# Patient Record
Sex: FEMALE | Race: WHITE | NOT HISPANIC OR LATINO | Employment: FULL TIME | URBAN - METROPOLITAN AREA
[De-identification: names, ages, dates, MRNs, and addresses within clinical notes are randomized per-mention and may not be internally consistent; named-entity substitution may affect disease eponyms.]

---

## 2017-01-06 ENCOUNTER — ALLSCRIPTS OFFICE VISIT (OUTPATIENT)
Dept: OTHER | Facility: OTHER | Age: 52
End: 2017-01-06

## 2017-01-06 DIAGNOSIS — F41.1 GENERALIZED ANXIETY DISORDER: ICD-10-CM

## 2017-01-06 DIAGNOSIS — R10.12 LEFT UPPER QUADRANT PAIN: ICD-10-CM

## 2017-01-06 DIAGNOSIS — K21.9 GASTRO-ESOPHAGEAL REFLUX DISEASE WITHOUT ESOPHAGITIS: ICD-10-CM

## 2017-01-09 ENCOUNTER — LAB CONVERSION - ENCOUNTER (OUTPATIENT)
Dept: OTHER | Facility: OTHER | Age: 52
End: 2017-01-09

## 2017-01-09 LAB
A/G RATIO (HISTORICAL): 1.8 (CALC) (ref 1–2.5)
ALBUMIN SERPL BCP-MCNC: 4.6 G/DL (ref 3.6–5.1)
ALP SERPL-CCNC: 61 U/L (ref 33–130)
ALT SERPL W P-5'-P-CCNC: 37 U/L (ref 6–29)
AST SERPL W P-5'-P-CCNC: 24 U/L (ref 10–35)
BACTERIA UR QL AUTO: ABNORMAL /HPF
BASOPHILS # BLD AUTO: 0.5 %
BASOPHILS # BLD AUTO: 27 CELLS/UL (ref 0–200)
BILIRUB SERPL-MCNC: 0.5 MG/DL (ref 0.2–1.2)
BILIRUB UR QL STRIP: NEGATIVE
BUN SERPL-MCNC: 16 MG/DL (ref 7–25)
BUN/CREA RATIO (HISTORICAL): ABNORMAL (CALC) (ref 6–22)
CALCIUM SERPL-MCNC: 10.5 MG/DL (ref 8.6–10.4)
CHLORIDE SERPL-SCNC: 103 MMOL/L (ref 98–110)
CO2 SERPL-SCNC: 25 MMOL/L (ref 20–31)
COLOR UR: YELLOW
COMMENT (HISTORICAL): CLEAR
CREAT SERPL-MCNC: 0.74 MG/DL (ref 0.5–1.05)
CULTURE RESULT (HISTORICAL): ABNORMAL
DEPRECATED RDW RBC AUTO: 13.5 % (ref 11–15)
EGFR AFRICAN AMERICAN (HISTORICAL): 108 ML/MIN/1.73M2
EGFR-AMERICAN CALC (HISTORICAL): 93 ML/MIN/1.73M2
EOSINOPHIL # BLD AUTO: 138 CELLS/UL (ref 15–500)
EOSINOPHIL # BLD AUTO: 2.6 %
FECAL OCCULT BLOOD DIAGNOSTIC (HISTORICAL): NEGATIVE
GAMMA GLOBULIN (HISTORICAL): 2.5 G/DL (CALC) (ref 1.9–3.7)
GLUCOSE (HISTORICAL): 98 MG/DL (ref 65–99)
GLUCOSE (HISTORICAL): NEGATIVE
HCT VFR BLD AUTO: 43.3 % (ref 35–45)
HGB BLD-MCNC: 14.3 G/DL (ref 11.7–15.5)
HYALINE CASTS #/AREA URNS LPF: ABNORMAL /LPF
KETONES UR STRIP-MCNC: NEGATIVE MG/DL
LEUKOCYTE ESTERASE UR QL STRIP: ABNORMAL
LIPASE SERPL-CCNC: 24 U/L (ref 7–60)
LYMPHOCYTES # BLD AUTO: 1696 CELLS/UL (ref 850–3900)
LYMPHOCYTES # BLD AUTO: 32 %
MCH RBC QN AUTO: 28 PG (ref 27–33)
MCHC RBC AUTO-ENTMCNC: 33 G/DL (ref 32–36)
MCV RBC AUTO: 84.9 FL (ref 80–100)
MONOCYTES # BLD AUTO: 371 CELLS/UL (ref 200–950)
MONOCYTES (HISTORICAL): 7 %
NEUTROPHILS # BLD AUTO: 3069 CELLS/UL (ref 1500–7800)
NEUTROPHILS # BLD AUTO: 57.9 %
NITRITE UR QL STRIP: NEGATIVE
PH UR STRIP.AUTO: 7 [PH] (ref 5–8)
PLATELET # BLD AUTO: 222 THOUSAND/UL (ref 140–400)
PMV BLD AUTO: 9.6 FL (ref 7.5–11.5)
POTASSIUM SERPL-SCNC: 4.2 MMOL/L (ref 3.5–5.3)
PROT UR STRIP-MCNC: NEGATIVE MG/DL
RBC # BLD AUTO: 5.1 MILLION/UL (ref 3.8–5.1)
RBC (HISTORICAL): ABNORMAL /HPF
SODIUM SERPL-SCNC: 138 MMOL/L (ref 135–146)
SP GR UR STRIP.AUTO: 1.02 (ref 1–1.03)
SQUAMOUS EPITHELIAL CELLS (HISTORICAL): ABNORMAL /HPF
TOTAL PROTEIN (HISTORICAL): 7.1 G/DL (ref 6.1–8.1)
TSH SERPL DL<=0.05 MIU/L-ACNC: 1.54 MIU/L
WBC # BLD AUTO: 5.3 THOUSAND/UL (ref 3.8–10.8)
WBC # BLD AUTO: ABNORMAL /HPF

## 2017-01-12 ENCOUNTER — GENERIC CONVERSION - ENCOUNTER (OUTPATIENT)
Dept: OTHER | Facility: OTHER | Age: 52
End: 2017-01-12

## 2017-02-09 DIAGNOSIS — R10.12 LEFT UPPER QUADRANT PAIN: ICD-10-CM

## 2017-03-20 ENCOUNTER — GENERIC CONVERSION - ENCOUNTER (OUTPATIENT)
Dept: OTHER | Facility: OTHER | Age: 52
End: 2017-03-20

## 2017-03-28 ENCOUNTER — GENERIC CONVERSION - ENCOUNTER (OUTPATIENT)
Dept: OTHER | Facility: OTHER | Age: 52
End: 2017-03-28

## 2017-04-04 DIAGNOSIS — K21.9 GASTRO-ESOPHAGEAL REFLUX DISEASE WITHOUT ESOPHAGITIS: ICD-10-CM

## 2017-04-04 DIAGNOSIS — R74.01 NONSPECIFIC ELEVATION OF LEVELS OF TRANSAMINASE AND LACTIC ACID DEHYDROGENASE (LDH): ICD-10-CM

## 2017-04-04 DIAGNOSIS — M54.9 DORSALGIA: ICD-10-CM

## 2017-04-04 DIAGNOSIS — R74.02 NONSPECIFIC ELEVATION OF LEVELS OF TRANSAMINASE AND LACTIC ACID DEHYDROGENASE (LDH): ICD-10-CM

## 2017-04-06 ENCOUNTER — GENERIC CONVERSION - ENCOUNTER (OUTPATIENT)
Dept: OTHER | Facility: OTHER | Age: 52
End: 2017-04-06

## 2017-04-19 ENCOUNTER — ALLSCRIPTS OFFICE VISIT (OUTPATIENT)
Dept: OTHER | Facility: OTHER | Age: 52
End: 2017-04-19

## 2017-04-29 ENCOUNTER — HOSPITAL ENCOUNTER (OUTPATIENT)
Dept: ULTRASOUND IMAGING | Facility: HOSPITAL | Age: 52
Discharge: HOME | End: 2017-04-29
Payer: COMMERCIAL

## 2017-04-29 DIAGNOSIS — M54.9 DORSALGIA: ICD-10-CM

## 2017-04-29 DIAGNOSIS — K21.9 GASTRO-ESOPHAGEAL REFLUX DISEASE WITHOUT ESOPHAGITIS: ICD-10-CM

## 2017-04-29 DIAGNOSIS — R74.02 NONSPECIFIC ELEVATION OF LEVELS OF TRANSAMINASE AND LACTIC ACID DEHYDROGENASE (LDH): ICD-10-CM

## 2017-04-29 DIAGNOSIS — R74.01 NONSPECIFIC ELEVATION OF LEVELS OF TRANSAMINASE AND LACTIC ACID DEHYDROGENASE (LDH): ICD-10-CM

## 2017-04-29 PROCEDURE — 76705 ECHO EXAM OF ABDOMEN: CPT

## 2017-05-31 ENCOUNTER — GENERIC CONVERSION - ENCOUNTER (OUTPATIENT)
Dept: OTHER | Facility: OTHER | Age: 52
End: 2017-05-31

## 2017-06-01 RX ORDER — MOMETASONE FUROATE 50 UG/1
2 SPRAY, METERED NASAL AS NEEDED
COMMUNITY
End: 2018-04-04 | Stop reason: SDUPTHER

## 2017-06-01 RX ORDER — ESOMEPRAZOLE MAGNESIUM 40 MG/1
40 CAPSULE, DELAYED RELEASE ORAL
Status: ON HOLD | COMMUNITY
End: 2017-06-02 | Stop reason: HOSPADM

## 2017-06-01 RX ORDER — ALPRAZOLAM 0.5 MG/1
0.5 TABLET ORAL EVERY 4 HOURS PRN
COMMUNITY
End: 2018-08-09 | Stop reason: SDUPTHER

## 2017-06-02 ENCOUNTER — GENERIC CONVERSION - ENCOUNTER (OUTPATIENT)
Dept: OTHER | Facility: OTHER | Age: 52
End: 2017-06-02

## 2017-06-02 ENCOUNTER — ANESTHESIA (OUTPATIENT)
Dept: GASTROENTEROLOGY | Facility: HOSPITAL | Age: 52
End: 2017-06-02
Payer: COMMERCIAL

## 2017-06-02 ENCOUNTER — HOSPITAL ENCOUNTER (OUTPATIENT)
Facility: HOSPITAL | Age: 52
Setting detail: OUTPATIENT SURGERY
Discharge: HOME | End: 2017-06-02
Attending: INTERNAL MEDICINE | Admitting: INTERNAL MEDICINE
Payer: COMMERCIAL

## 2017-06-02 ENCOUNTER — ANESTHESIA EVENT (OUTPATIENT)
Dept: GASTROENTEROLOGY | Facility: HOSPITAL | Age: 52
End: 2017-06-02
Payer: COMMERCIAL

## 2017-06-02 VITALS
WEIGHT: 180 LBS | HEIGHT: 63 IN | OXYGEN SATURATION: 100 % | RESPIRATION RATE: 16 BRPM | TEMPERATURE: 98.8 F | SYSTOLIC BLOOD PRESSURE: 105 MMHG | DIASTOLIC BLOOD PRESSURE: 70 MMHG | BODY MASS INDEX: 31.89 KG/M2 | HEART RATE: 70 BPM

## 2017-06-02 DIAGNOSIS — Z12.12 ENCOUNTER FOR SCREENING FOR MALIGNANT NEOPLASM OF RECTUM: ICD-10-CM

## 2017-06-02 DIAGNOSIS — K21.9 GASTRO-ESOPHAGEAL REFLUX DISEASE WITHOUT ESOPHAGITIS: ICD-10-CM

## 2017-06-02 DIAGNOSIS — Z12.11 ENCOUNTER FOR SCREENING FOR MALIGNANT NEOPLASM OF COLON: ICD-10-CM

## 2017-06-02 PROCEDURE — 88342 IMHCHEM/IMCYTCHM 1ST ANTB: CPT | Performed by: INTERNAL MEDICINE

## 2017-06-02 PROCEDURE — 88305 TISSUE EXAM BY PATHOLOGIST: CPT | Performed by: INTERNAL MEDICINE

## 2017-06-02 PROCEDURE — 88313 SPECIAL STAINS GROUP 2: CPT | Performed by: INTERNAL MEDICINE

## 2017-06-02 RX ORDER — SODIUM CHLORIDE 9 MG/ML
100 INJECTION, SOLUTION INTRAVENOUS CONTINUOUS
Status: CANCELLED | OUTPATIENT
Start: 2017-06-02

## 2017-06-02 RX ORDER — PROPOFOL 10 MG/ML
INJECTION, EMULSION INTRAVENOUS CONTINUOUS PRN
Status: DISCONTINUED | OUTPATIENT
Start: 2017-06-02 | End: 2017-06-02 | Stop reason: SURG

## 2017-06-02 RX ORDER — CETIRIZINE HYDROCHLORIDE 10 MG/1
10 TABLET ORAL DAILY
COMMUNITY

## 2017-06-02 RX ORDER — SODIUM CHLORIDE 9 MG/ML
50 INJECTION, SOLUTION INTRAVENOUS CONTINUOUS
Status: DISCONTINUED | OUTPATIENT
Start: 2017-06-02 | End: 2017-06-02 | Stop reason: HOSPADM

## 2017-06-02 RX ORDER — PROPOFOL 10 MG/ML
INJECTION, EMULSION INTRAVENOUS AS NEEDED
Status: DISCONTINUED | OUTPATIENT
Start: 2017-06-02 | End: 2017-06-02 | Stop reason: SURG

## 2017-06-02 RX ORDER — AMOXICILLIN 500 MG/1
500 CAPSULE ORAL 2 TIMES DAILY
COMMUNITY
End: 2017-08-29

## 2017-06-02 RX ORDER — OMEPRAZOLE 40 MG/1
40 CAPSULE, DELAYED RELEASE ORAL
COMMUNITY
End: 2018-08-12 | Stop reason: ALTCHOICE

## 2017-06-02 RX ADMIN — PROPOFOL 80 MCG/KG/MIN: 10 INJECTION, EMULSION INTRAVENOUS at 10:33

## 2017-06-02 RX ADMIN — PROPOFOL 80 MG: 10 INJECTION, EMULSION INTRAVENOUS at 10:28

## 2017-06-02 RX ADMIN — SODIUM CHLORIDE 50 ML/HR: 0.9 INJECTION, SOLUTION INTRAVENOUS at 10:14

## 2017-06-02 RX ADMIN — PROPOFOL 30 MG: 10 INJECTION, EMULSION INTRAVENOUS at 10:31

## 2017-06-02 RX ADMIN — LIDOCAINE HYDROCHLORIDE 100 MG: 20 INJECTION, SOLUTION INTRAVENOUS at 10:28

## 2017-06-21 ENCOUNTER — GENERIC CONVERSION - ENCOUNTER (OUTPATIENT)
Dept: OTHER | Facility: OTHER | Age: 52
End: 2017-06-21

## 2017-06-29 ENCOUNTER — ALLSCRIPTS OFFICE VISIT (OUTPATIENT)
Dept: OTHER | Facility: OTHER | Age: 52
End: 2017-06-29

## 2017-06-30 DIAGNOSIS — R22.9 LOCALIZED SWELLING, MASS AND LUMP, UNSPECIFIED: ICD-10-CM

## 2017-07-19 ENCOUNTER — ALLSCRIPTS OFFICE VISIT (OUTPATIENT)
Dept: OTHER | Facility: OTHER | Age: 52
End: 2017-07-19

## 2017-07-21 ENCOUNTER — HOSPITAL ENCOUNTER (OUTPATIENT)
Dept: MRI IMAGING | Facility: HOSPITAL | Age: 52
Discharge: HOME | End: 2017-07-21
Attending: SURGERY
Payer: COMMERCIAL

## 2017-07-21 DIAGNOSIS — R22.9 LOCALIZED SWELLING, MASS AND LUMP, UNSPECIFIED: ICD-10-CM

## 2017-07-21 PROCEDURE — 72197 MRI PELVIS W/O & W/DYE: CPT

## 2017-07-21 PROCEDURE — A9585 GADOBUTROL INJECTION: HCPCS | Performed by: SURGERY

## 2017-07-21 RX ADMIN — GADOBUTROL 7 ML: 604.72 INJECTION INTRAVENOUS at 19:06

## 2017-07-26 ENCOUNTER — GENERIC CONVERSION - ENCOUNTER (OUTPATIENT)
Dept: OTHER | Facility: OTHER | Age: 52
End: 2017-07-26

## 2017-08-07 ENCOUNTER — ALLSCRIPTS OFFICE VISIT (OUTPATIENT)
Dept: OTHER | Facility: OTHER | Age: 52
End: 2017-08-07

## 2017-08-07 DIAGNOSIS — R74.02 NONSPECIFIC ELEVATION OF LEVELS OF TRANSAMINASE AND LACTIC ACID DEHYDROGENASE (LDH): ICD-10-CM

## 2017-08-07 DIAGNOSIS — R74.01 NONSPECIFIC ELEVATION OF LEVELS OF TRANSAMINASE AND LACTIC ACID DEHYDROGENASE (LDH): ICD-10-CM

## 2017-08-29 ENCOUNTER — ANESTHESIA EVENT (OUTPATIENT)
Dept: PERIOP | Facility: HOSPITAL | Age: 52
End: 2017-08-29
Payer: COMMERCIAL

## 2017-08-29 RX ORDER — IBUPROFEN 200 MG
400 TABLET ORAL EVERY 6 HOURS PRN
COMMUNITY
End: 2018-04-04

## 2017-08-29 RX ORDER — ACETAMINOPHEN 500 MG
500 TABLET ORAL EVERY 6 HOURS PRN
COMMUNITY

## 2017-08-30 ENCOUNTER — GENERIC CONVERSION - ENCOUNTER (OUTPATIENT)
Dept: PERIOP | Facility: HOSPITAL | Age: 52
End: 2017-08-30

## 2017-08-30 ENCOUNTER — HOSPITAL ENCOUNTER (OUTPATIENT)
Facility: HOSPITAL | Age: 52
Setting detail: OUTPATIENT SURGERY
Discharge: HOME | End: 2017-08-30
Attending: SURGERY | Admitting: SURGERY
Payer: COMMERCIAL

## 2017-08-30 ENCOUNTER — ANESTHESIA (OUTPATIENT)
Dept: PERIOP | Facility: HOSPITAL | Age: 52
End: 2017-08-30
Payer: COMMERCIAL

## 2017-08-30 VITALS
OXYGEN SATURATION: 95 % | RESPIRATION RATE: 16 BRPM | BODY MASS INDEX: 31.36 KG/M2 | HEART RATE: 69 BPM | WEIGHT: 177 LBS | HEIGHT: 63 IN | SYSTOLIC BLOOD PRESSURE: 126 MMHG | TEMPERATURE: 98.3 F | DIASTOLIC BLOOD PRESSURE: 71 MMHG

## 2017-08-30 DIAGNOSIS — L72.3 SEBACEOUS CYST: ICD-10-CM

## 2017-08-30 PROCEDURE — 88304 TISSUE EXAM BY PATHOLOGIST: CPT | Performed by: SURGERY

## 2017-08-30 RX ORDER — MORPHINE SULFATE 4 MG/ML
2 INJECTION, SOLUTION INTRAMUSCULAR; INTRAVENOUS EVERY 2 HOUR PRN
Status: DISCONTINUED | OUTPATIENT
Start: 2017-08-30 | End: 2017-08-30 | Stop reason: HOSPADM

## 2017-08-30 RX ORDER — DEXAMETHASONE SODIUM PHOSPHATE 4 MG/ML
INJECTION, SOLUTION INTRA-ARTICULAR; INTRALESIONAL; INTRAMUSCULAR; INTRAVENOUS; SOFT TISSUE AS NEEDED
Status: DISCONTINUED | OUTPATIENT
Start: 2017-08-30 | End: 2017-08-30 | Stop reason: SURG

## 2017-08-30 RX ORDER — FENTANYL CITRATE/PF 50 MCG/ML
25 SYRINGE (ML) INJECTION
Status: COMPLETED | OUTPATIENT
Start: 2017-08-30 | End: 2017-08-30

## 2017-08-30 RX ORDER — MEPERIDINE HYDROCHLORIDE 50 MG/ML
12.5 INJECTION INTRAMUSCULAR; INTRAVENOUS; SUBCUTANEOUS AS NEEDED
Status: DISCONTINUED | OUTPATIENT
Start: 2017-08-30 | End: 2017-08-30 | Stop reason: HOSPADM

## 2017-08-30 RX ORDER — PROPOFOL 10 MG/ML
INJECTION, EMULSION INTRAVENOUS AS NEEDED
Status: DISCONTINUED | OUTPATIENT
Start: 2017-08-30 | End: 2017-08-30 | Stop reason: SURG

## 2017-08-30 RX ORDER — FENTANYL CITRATE 50 UG/ML
INJECTION, SOLUTION INTRAMUSCULAR; INTRAVENOUS AS NEEDED
Status: DISCONTINUED | OUTPATIENT
Start: 2017-08-30 | End: 2017-08-30 | Stop reason: SURG

## 2017-08-30 RX ORDER — SODIUM CHLORIDE 9 MG/ML
125 INJECTION, SOLUTION INTRAVENOUS CONTINUOUS
Status: DISCONTINUED | OUTPATIENT
Start: 2017-08-30 | End: 2017-08-30 | Stop reason: HOSPADM

## 2017-08-30 RX ORDER — HYDROCODONE BITARTRATE AND ACETAMINOPHEN 5; 325 MG/1; MG/1
1 TABLET ORAL EVERY 4 HOURS PRN
Status: DISCONTINUED | OUTPATIENT
Start: 2017-08-30 | End: 2017-08-30 | Stop reason: HOSPADM

## 2017-08-30 RX ORDER — FENTANYL CITRATE/PF 50 MCG/ML
50 SYRINGE (ML) INJECTION
Status: DISCONTINUED | OUTPATIENT
Start: 2017-08-30 | End: 2017-08-30 | Stop reason: HOSPADM

## 2017-08-30 RX ORDER — ONDANSETRON 2 MG/ML
4 INJECTION INTRAMUSCULAR; INTRAVENOUS EVERY 4 HOURS PRN
Status: DISCONTINUED | OUTPATIENT
Start: 2017-08-30 | End: 2017-08-30 | Stop reason: HOSPADM

## 2017-08-30 RX ORDER — ONDANSETRON 2 MG/ML
INJECTION INTRAMUSCULAR; INTRAVENOUS
Status: COMPLETED
Start: 2017-08-30 | End: 2017-08-30

## 2017-08-30 RX ORDER — EPHEDRINE SULFATE 50 MG/ML
INJECTION, SOLUTION INTRAVENOUS AS NEEDED
Status: DISCONTINUED | OUTPATIENT
Start: 2017-08-30 | End: 2017-08-30 | Stop reason: SURG

## 2017-08-30 RX ORDER — ACETAMINOPHEN 325 MG/1
650 TABLET ORAL EVERY 6 HOURS PRN
Status: DISCONTINUED | OUTPATIENT
Start: 2017-08-30 | End: 2017-08-30 | Stop reason: HOSPADM

## 2017-08-30 RX ORDER — ONDANSETRON 2 MG/ML
4 INJECTION INTRAMUSCULAR; INTRAVENOUS ONCE AS NEEDED
Status: DISCONTINUED | OUTPATIENT
Start: 2017-08-30 | End: 2017-08-30 | Stop reason: HOSPADM

## 2017-08-30 RX ORDER — SODIUM CHLORIDE, SODIUM LACTATE, POTASSIUM CHLORIDE, CALCIUM CHLORIDE 600; 310; 30; 20 MG/100ML; MG/100ML; MG/100ML; MG/100ML
75 INJECTION, SOLUTION INTRAVENOUS CONTINUOUS
Status: DISCONTINUED | OUTPATIENT
Start: 2017-08-30 | End: 2017-08-30 | Stop reason: HOSPADM

## 2017-08-30 RX ORDER — HYDROCODONE BITARTRATE AND ACETAMINOPHEN 5; 325 MG/1; MG/1
1 TABLET ORAL EVERY 6 HOURS PRN
Qty: 30 TABLET | Refills: 0 | Status: SHIPPED | OUTPATIENT
Start: 2017-08-30 | End: 2018-02-06

## 2017-08-30 RX ORDER — MAGNESIUM HYDROXIDE 1200 MG/15ML
LIQUID ORAL AS NEEDED
Status: DISCONTINUED | OUTPATIENT
Start: 2017-08-30 | End: 2017-08-30

## 2017-08-30 RX ORDER — MIDAZOLAM HYDROCHLORIDE 1 MG/ML
INJECTION INTRAMUSCULAR; INTRAVENOUS AS NEEDED
Status: DISCONTINUED | OUTPATIENT
Start: 2017-08-30 | End: 2017-08-30 | Stop reason: SURG

## 2017-08-30 RX ORDER — SODIUM CHLORIDE, SODIUM LACTATE, POTASSIUM CHLORIDE, CALCIUM CHLORIDE 600; 310; 30; 20 MG/100ML; MG/100ML; MG/100ML; MG/100ML
80 INJECTION, SOLUTION INTRAVENOUS CONTINUOUS
Status: DISCONTINUED | OUTPATIENT
Start: 2017-08-30 | End: 2017-08-30 | Stop reason: HOSPADM

## 2017-08-30 RX ORDER — ONDANSETRON 2 MG/ML
INJECTION INTRAMUSCULAR; INTRAVENOUS AS NEEDED
Status: DISCONTINUED | OUTPATIENT
Start: 2017-08-30 | End: 2017-08-30 | Stop reason: SURG

## 2017-08-30 RX ADMIN — FENTANYL CITRATE 25 MCG: 50 INJECTION INTRAMUSCULAR; INTRAVENOUS at 17:51

## 2017-08-30 RX ADMIN — EPHEDRINE SULFATE 10 MG: 50 INJECTION, SOLUTION INTRAMUSCULAR; INTRAVENOUS; SUBCUTANEOUS at 16:37

## 2017-08-30 RX ADMIN — ONDANSETRON HYDROCHLORIDE 4 MG: 2 INJECTION, SOLUTION INTRAVENOUS at 16:51

## 2017-08-30 RX ADMIN — SODIUM CHLORIDE: 0.9 INJECTION, SOLUTION INTRAVENOUS at 17:02

## 2017-08-30 RX ADMIN — FENTANYL CITRATE 100 MCG: 50 INJECTION, SOLUTION INTRAMUSCULAR; INTRAVENOUS at 16:27

## 2017-08-30 RX ADMIN — FENTANYL CITRATE 25 MCG: 50 INJECTION INTRAMUSCULAR; INTRAVENOUS at 17:31

## 2017-08-30 RX ADMIN — LIDOCAINE HYDROCHLORIDE 100 MG: 20 INJECTION, SOLUTION INTRAVENOUS at 16:33

## 2017-08-30 RX ADMIN — DEXAMETHASONE SODIUM PHOSPHATE 4 MG: 4 INJECTION, SOLUTION INTRAMUSCULAR; INTRAVENOUS at 16:50

## 2017-08-30 RX ADMIN — CEFAZOLIN SODIUM 2000 MG: 2 SOLUTION INTRAVENOUS at 16:29

## 2017-08-30 RX ADMIN — SODIUM CHLORIDE 125 ML/HR: 0.9 INJECTION, SOLUTION INTRAVENOUS at 13:14

## 2017-08-30 RX ADMIN — MIDAZOLAM HYDROCHLORIDE 2 MG: 1 INJECTION, SOLUTION INTRAMUSCULAR; INTRAVENOUS at 16:27

## 2017-08-30 RX ADMIN — PROPOFOL 200 MG: 10 INJECTION, EMULSION INTRAVENOUS at 16:33

## 2017-08-30 RX ADMIN — FENTANYL CITRATE 25 MCG: 50 INJECTION INTRAMUSCULAR; INTRAVENOUS at 17:36

## 2017-08-30 RX ADMIN — ONDANSETRON 4 MG: 2 INJECTION INTRAMUSCULAR; INTRAVENOUS at 19:07

## 2017-08-30 RX ADMIN — FENTANYL CITRATE 25 MCG: 50 INJECTION INTRAMUSCULAR; INTRAVENOUS at 17:44

## 2017-09-05 ENCOUNTER — GENERIC CONVERSION - ENCOUNTER (OUTPATIENT)
Dept: OTHER | Facility: OTHER | Age: 52
End: 2017-09-05

## 2017-09-11 ENCOUNTER — ALLSCRIPTS OFFICE VISIT (OUTPATIENT)
Dept: OTHER | Facility: OTHER | Age: 52
End: 2017-09-11

## 2017-09-20 ENCOUNTER — GENERIC CONVERSION - ENCOUNTER (OUTPATIENT)
Dept: OTHER | Facility: OTHER | Age: 52
End: 2017-09-20

## 2017-09-21 ENCOUNTER — TRANSCRIBE ORDERS (OUTPATIENT)
Dept: ADMINISTRATIVE | Facility: HOSPITAL | Age: 52
End: 2017-09-21

## 2017-09-21 ENCOUNTER — ALLSCRIPTS OFFICE VISIT (OUTPATIENT)
Dept: OTHER | Facility: OTHER | Age: 52
End: 2017-09-21

## 2017-09-21 DIAGNOSIS — IMO0002 MASS: Primary | ICD-10-CM

## 2017-09-26 ENCOUNTER — GENERIC CONVERSION - ENCOUNTER (OUTPATIENT)
Dept: OTHER | Facility: OTHER | Age: 52
End: 2017-09-26

## 2017-09-27 ENCOUNTER — HOSPITAL ENCOUNTER (OUTPATIENT)
Dept: MRI IMAGING | Facility: HOSPITAL | Age: 52
End: 2017-09-27
Attending: NEUROLOGICAL SURGERY
Payer: COMMERCIAL

## 2017-09-27 ENCOUNTER — HOSPITAL ENCOUNTER (OUTPATIENT)
Dept: MRI IMAGING | Facility: HOSPITAL | Age: 52
Discharge: HOME | End: 2017-09-27
Attending: NEUROLOGICAL SURGERY
Payer: COMMERCIAL

## 2017-09-27 DIAGNOSIS — IMO0002 MASS: ICD-10-CM

## 2017-09-27 PROCEDURE — 72197 MRI PELVIS W/O & W/DYE: CPT

## 2017-09-27 PROCEDURE — A9585 GADOBUTROL INJECTION: HCPCS | Performed by: NEUROLOGICAL SURGERY

## 2017-09-27 RX ADMIN — GADOBUTROL 8 ML: 604.72 INJECTION INTRAVENOUS at 18:54

## 2017-10-04 ENCOUNTER — GENERIC CONVERSION - ENCOUNTER (OUTPATIENT)
Dept: OTHER | Facility: OTHER | Age: 52
End: 2017-10-04

## 2017-10-05 ENCOUNTER — GENERIC CONVERSION - ENCOUNTER (OUTPATIENT)
Dept: OTHER | Facility: OTHER | Age: 52
End: 2017-10-05

## 2017-10-06 DIAGNOSIS — E78.5 HYPERLIPIDEMIA: ICD-10-CM

## 2017-10-06 DIAGNOSIS — K21.9 GASTRO-ESOPHAGEAL REFLUX DISEASE WITHOUT ESOPHAGITIS: ICD-10-CM

## 2017-10-06 DIAGNOSIS — D36.10 BENIGN NEOPLASM OF PERIPHERAL NERVES AND AUTONOMIC NERVOUS SYSTEM, UNSPECIFIED (CODE): ICD-10-CM

## 2017-10-06 DIAGNOSIS — R22.9 LOCALIZED SWELLING, MASS AND LUMP, UNSPECIFIED: ICD-10-CM

## 2017-10-06 DIAGNOSIS — F41.1 GENERALIZED ANXIETY DISORDER: ICD-10-CM

## 2017-10-06 DIAGNOSIS — R74.02 NONSPECIFIC ELEVATION OF LEVELS OF TRANSAMINASE AND LACTIC ACID DEHYDROGENASE (LDH): ICD-10-CM

## 2017-10-06 DIAGNOSIS — R74.01 NONSPECIFIC ELEVATION OF LEVELS OF TRANSAMINASE AND LACTIC ACID DEHYDROGENASE (LDH): ICD-10-CM

## 2017-10-06 DIAGNOSIS — K22.70 BARRETT'S ESOPHAGUS WITHOUT DYSPLASIA: ICD-10-CM

## 2017-10-06 NOTE — CONSULTS
Assessment  1  Mass (782 2) (R22 9)    Plan  Mass    · Follow Up After Tests Complete Evaluation and Treatment  Follow-up  Status: Hold For -  Scheduling  Requested for: [id number]   Ordered; For: Mass; Ordered By: Mariza Lara Performed:  Due: 18OWP3825   · MRI FOLLOW UP; Status:Need Information - Financial Authorization; Requested  [facility]:[id number];    Perform:[facility] Radiology; Order Comments:MRI of the lumbosacral plexus and the Right gluteus brenda region with and without contrast attention Dr Charmaine Avalos; [phone]; Last Updated By:Nery Jim; 9/21/2017 1:13:22 PM;Ordered; For:Mass; Ordered By:Reggie Stratton;    Discussion/Summary    This is a 18-year-old female with a mass in the region of the gluteus brenda  This is in close association with the nerves in the region and has 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  She underwent and attempted excision of this however the association with the nerves were appreciated and the surgeons stopped further resection  Today the patient appears to have a mass in close association with the inferior lumbosacral plexus    further characterization of this with a dedicated lumbosacral plexus MRI is important    she will require further surgical resection utilizing intraoperative neuro monitoring  We will see her back in the office following completion of the study  Chief Complaint  Patient presents for initial evaluation regarding sacral mass with MRI Pelvis for review      History of Present Illness  Patient is 18-year-old female referred by Dr Emerald López for sacral mass  This mass was identified in a MRI Pelvis measuring 7 2 x 6 8 x 3 6 cm  Patient was evaluated by Dr Emerald López and advised she was a candidate for excision of gluteal mass  When patient presented for surgery this was found to be in the sacral region and nothing was able to be done at the time  She was referred for neurosurgical evaluation    patient complains of back pain that goes down her legs  It goes into her right buttock as well  It is very uncomfortable when sitting  Denies numbness and tingling  No weakness noted  pain occurred insidiously  It is localized primarily in the buttocks and going down into the leg  The pain is in the posterior distribution of the leg and does not wrap to the front  It does not go past the ankle  It does extend just slightly past the knee  Her leg has not given out on her  Her primary complaint is that of sitting which causes a worsening of her pain and numbness and tingling  She has to work for some time to find a position of comfort with sleeping at night  Review of Systems    Constitutional: No fever, no chills, feels well, no tiredness, no recent weight gain or weight loss  Eyes: No complaints of eye pain, no red eyes, no eyesight problems, no discharge, no dry eyes, no itching of eyes  ENT: no complaints of earache, no loss of hearing, no nose bleeds, no nasal discharge, no sore throat, no hoarseness  Cardiovascular: No complaints of slow heart rate, no fast heart rate, no chest pain, no palpitations, no leg claudication, no lower extremity edema  Respiratory: No complaints of shortness of breath, no wheezing, no cough, no SOB on exertion, no orthopnea, no PND  Gastrointestinal: No complaints of abdominal pain, no constipation, no nausea or vomiting, no diarrhea, no bloody stools  Genitourinary: No complaints of dysuria, no incontinence, no pelvic pain, no dysmenorrhea, no vaginal discharge or bleeding  Musculoskeletal: limb pain-and-lower back pain, but-as noted in HPI  Integumentary: No complaints of skin rash or lesions, no itching, no skin wounds, no breast pain or lump  Neurological: No complaints of headache, no confusion, no convulsions, no numbness, no dizziness or fainting, no tingling, no limb weakness, no difficulty walking     Psychiatric: Not suicidal, no sleep disturbance, no anxiety or depression, no change in personality, no emotional problems  Endocrine: No complaints of proptosis, no hot flashes, no muscle weakness, no deepening of the voice, no feelings of weakness  Hematologic/Lymphatic: No complaints of swollen glands, no swollen glands in the neck, does not bleed easily, does not bruise easily  ROS reviewed  Active Problems  1  Acute serous otitis media, unspecified laterality   2  Allergic rhinitis (477 9) (J30 9)   3  Saucedo's esophagus without dysplasia (530 85) (K22 70)   4  Bilateral ovarian cysts (620 2) (N83 201,N83 202)   5  Elevated ALT measurement (790 4) (R74 0)   6  Esophageal reflux (530 81) (K21 9)   7  Generalized anxiety disorder (300 02) (F41 1)   8  Hepatic steatosis (571 8) (K76 0)   9  Infection, upper respiratory (465 9) (J06 9)   10  Irritable bowel syndrome (IBS) (564 1) (K58 9)   11  Local superficial swelling, mass or lump (782 2) (R22 9)   12  Mass (782 2) (R22 9)   13  Neuroma (215 9) (D36 10)   14  Pain, upper back (724 5) (M54 9)   15  Screening for colorectal cancer (V76 51) (Z12 11,Z12 12)   16  Visit for screening mammogram (V76 12) (Z12 31)    Past Medical History  1  History of Continuous LUQ abdominal pain (789 02) (R10 12)   2  History of abscess of skin and subcutaneous tissue (V13 3) (Z87 2)    The active problems and past medical history were reviewed and updated today  Surgical History  1  History of Chemosurgery (Mohs Micrographic Technique)   2  History of Complete Colonoscopy    The surgical history was reviewed and updated today  Family History  Mother    1  Family history of colonic polyps (V18 51) (Z83 71)  Father    2  Family history of Colon cancer    The family history was reviewed and updated today         Social History   · Employed   · High school or GED   · Lives with spouse   ·    · Never a smoker   · No illicit drug use   · Social drinker (V49 89) (Z78 9)   · Two children  The social history was reviewed and updated today  Current Meds   1  ZyrTEC Allergy CAPS; take 1 capsule daily; Therapy: (Recorded:58Sqx8971) to Recorded    Allergies  1  Sulfa Drugs    Vitals  Vital Signs    Recorded: 27GGY8240 10:22AM   Temperature 98 9 F   Heart Rate 75   Respiration 18   Systolic 977   Diastolic 78   Height 5 ft 3 in   Weight 180 lb 6 oz   BMI Calculated 31 95   BSA Calculated 1 85   Pain Scale 0     Physical Exam   (Paresis of the right gluteus brenda with extension at the hip  She has difficulty and stepping up onto a standing stool with the right side  There is pain to palpation over the right buttocks region  She has small hyper chrome attic birthmarks however none that is greater than 4 or 5 mm  She has no other subcutaneous nodules)   Mental Status: Alert and Oriented x3  -Memory is intact  -Attentive  -Speech is articulate and fluent  -Knowledge and vocabulary consistent with education  -Grossly nonfocal     Cranial Nerve Exam:  2nd cranial nerve: Normal with no noted deficit -3rd, 4th, and 6th cranial nerves: PERRLA and EOMI without later gaze nystagmus  -5th cranial nerve: Normal with no noted deficit  -7th cranial nerve: Face symmetrical at grimace and at rest  -8th cranial nerve: Grossly intact to finger rub bilaterally  -11th cranial nerve: Shoulder shrug equal bilaterally  Motor System - Upper Extremities: Muscle strength: 5/5 bilaterally  -Muscle tone: Normal bilaterally  -Muscle Bulk: Normal Muscle bulk bilaterally  Motor System - Lower Extremities: Muscle strength: 5/5 bilaterally  -Muscle tone: Normal bilaterally  -Muscle Bulk: Normal Muscle bulk bilaterally  Reflexes: Reflexes: Bicep reflex intact, brachioradialis reflex intact, tricep reflex intact, achilles reflex intact, patellar reflex intact bilaterally and fine finger movement  Babinski's reflex is down going bilaterally  Sierra's sign is absent bilaterally    Coordination: Coordination: Finger to nose was normal, heel to knee to shin was normal able to perform rapid alternating movements well bilaterally  -Involuntary movements: None   Sensory: Sensation:Sensation grossly intact to light tough and pinprick  Gait and Station:   Gait and station: Abnormal  -Painful gait  Constitutional General appearance: No acute distress, well appearing and well nourished  Results/Data         I personally reviewed the in detail with the patient  MRI Review An MRI of the buttocks region is carefully reviewed  This demonstrates a lobulated mass in the region of the inferior brachial plexus  images are included above        Signatures   Electronically signed by : FLORENCIO Ariza ; Oct  5 2017 10:46AM EST                       (Author)

## 2017-10-09 ENCOUNTER — GENERIC CONVERSION - ENCOUNTER (OUTPATIENT)
Dept: OTHER | Facility: OTHER | Age: 52
End: 2017-10-09

## 2017-10-19 ENCOUNTER — ALLSCRIPTS OFFICE VISIT (OUTPATIENT)
Dept: OTHER | Facility: OTHER | Age: 52
End: 2017-10-19

## 2017-10-19 DIAGNOSIS — R74.02 NONSPECIFIC ELEVATION OF LEVELS OF TRANSAMINASE AND LACTIC ACID DEHYDROGENASE (LDH): ICD-10-CM

## 2017-10-19 DIAGNOSIS — Z12.31 ENCOUNTER FOR SCREENING MAMMOGRAM FOR MALIGNANT NEOPLASM OF BREAST: ICD-10-CM

## 2017-10-19 DIAGNOSIS — R74.01 NONSPECIFIC ELEVATION OF LEVELS OF TRANSAMINASE AND LACTIC ACID DEHYDROGENASE (LDH): ICD-10-CM

## 2017-10-19 DIAGNOSIS — D36.10 BENIGN NEOPLASM OF PERIPHERAL NERVES AND AUTONOMIC NERVOUS SYSTEM, UNSPECIFIED (CODE): ICD-10-CM

## 2017-10-29 ENCOUNTER — LAB CONVERSION - ENCOUNTER (OUTPATIENT)
Dept: OTHER | Facility: OTHER | Age: 52
End: 2017-10-29

## 2017-10-29 LAB
A/G RATIO (HISTORICAL): 2 (CALC) (ref 1–2.5)
ALBUMIN SERPL BCP-MCNC: 4.7 G/DL (ref 3.6–5.1)
ALP SERPL-CCNC: 72 U/L (ref 33–130)
ALT SERPL W P-5'-P-CCNC: 39 U/L (ref 6–29)
AST SERPL W P-5'-P-CCNC: 28 U/L (ref 10–35)
BILIRUB SERPL-MCNC: 0.5 MG/DL (ref 0.2–1.2)
BUN SERPL-MCNC: 20 MG/DL (ref 7–25)
BUN/CREA RATIO (HISTORICAL): ABNORMAL (CALC) (ref 6–22)
CALCIUM SERPL-MCNC: 9.9 MG/DL (ref 8.6–10.4)
CHLORIDE SERPL-SCNC: 106 MMOL/L (ref 98–110)
CHOLEST SERPL-MCNC: 197 MG/DL
CHOLEST/HDLC SERPL: 5.5 (CALC)
CO2 SERPL-SCNC: 25 MMOL/L (ref 20–31)
CREAT SERPL-MCNC: 0.76 MG/DL (ref 0.5–1.05)
DEPRECATED RDW RBC AUTO: 14.1 % (ref 11–15)
EGFR AFRICAN AMERICAN (HISTORICAL): 104 ML/MIN/1.73M2
EGFR-AMERICAN CALC (HISTORICAL): 90 ML/MIN/1.73M2
GAMMA GLOBULIN (HISTORICAL): 2.3 G/DL (CALC) (ref 1.9–3.7)
GLUCOSE (HISTORICAL): 98 MG/DL (ref 65–99)
HCT VFR BLD AUTO: 41.8 % (ref 35–45)
HDLC SERPL-MCNC: 36 MG/DL
HGB BLD-MCNC: 14.1 G/DL (ref 11.7–15.5)
LDL CHOLESTEROL (HISTORICAL): 131 MG/DL (CALC)
MCH RBC QN AUTO: 28.4 PG (ref 27–33)
MCHC RBC AUTO-ENTMCNC: 33.7 G/DL (ref 32–36)
MCV RBC AUTO: 84.1 FL (ref 80–100)
NON-HDL-CHOL (CHOL-HDL) (HISTORICAL): 161 MG/DL (CALC)
PLATELET # BLD AUTO: 226 THOUSAND/UL (ref 140–400)
PMV BLD AUTO: 11.5 FL (ref 7.5–12.5)
POTASSIUM SERPL-SCNC: 4.4 MMOL/L (ref 3.5–5.3)
RBC # BLD AUTO: 4.97 MILLION/UL (ref 3.8–5.1)
SODIUM SERPL-SCNC: 139 MMOL/L (ref 135–146)
TOTAL PROTEIN (HISTORICAL): 7 G/DL (ref 6.1–8.1)
TRIGL SERPL-MCNC: 163 MG/DL
TSH SERPL DL<=0.05 MIU/L-ACNC: 1.18 MIU/L
WBC # BLD AUTO: 6.2 THOUSAND/UL (ref 3.8–10.8)

## 2017-11-07 ENCOUNTER — ALLSCRIPTS OFFICE VISIT (OUTPATIENT)
Dept: OTHER | Facility: OTHER | Age: 52
End: 2017-11-07

## 2017-11-09 NOTE — PROGRESS NOTES
Assessment    1  Encounter for preventive health examination (V70 0) (Z00 00)   2  Neuroma (215 9) (D36 10)   3  Preoperative clearance (V72 84) (Z01 818)   4  Esophageal reflux (530 81) (K21 9)   5  Hepatic steatosis (571 8) (K76 0)    Plan   Neuroma    · Hydrocodone-Acetaminophen 5-325 MG Oral Tablet; 1 tablet  at at bedtime asneeded for pain  EKG/ECG- POC; Status:Resulted - Requires Verification,Retrospective Authorization;   Done: 64QWO9193 12:00AM Due:07Nov2018; Last Updated By:Cristine Blair; 11/7/2017 6:36:49 PM;Ordered; For:Preoperative clearance; Ordered By:Macario Zheng; Discussion/Summary  Surgical Clearance: She is at a LOW risk from a cardiovascular standpoint at this time without any additional cardiac testing  Reevaluation needed, if she should present with symptoms prior to surgery/procedure  Surgical clearance faxed to Dr Dane Cohen   Patient presents for annual well exam/preop evaluation prior to surgery for removal of painful right buttock schwannoma  Procedure scheduled for November 17th  EKG performed in the office today is normal  Patient denies chest pain, palpitations, shortness of breath or dizziness  She had negative stress echo in March of 2017  Patient is acceptable risk for surgery  She has been experiencing significant pain at night due to pressure effect of right buttock mass, I provided her with prescription of Vicodin to be used at nighttime as needed only  I advised patient to avoid any anti-inflammatories 1 week prior to surgery  is past due mammography and gyn exam, advised her to set up liver-mild elevation of ALT, patient was evaluated by GI with EGD consistent with Saucedo's and right upper quadrant ultrasound consistent with fatty liver  She follows low-fat low-cholesterol diet and remains on Nexium  is up-to-date with colonoscopy  Possible side effects of new medications were reviewed with the patient/guardian today   The treatment plan was reviewed with the patient/guardian  The patient/guardian understands and agrees with the treatment plan      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 at [facility] by Dr Raheel Jang 11/17/17  All meds/allergies reviewed and updated w/ pt  History of Present Illness  Pre-Op Visit (Brief): The patient is being seen for a preoperative visit  Surgical Risk Assessment: Exercise Capacity: able to walk four blocks without symptoms-- and-- able to walk two flights of stairs without symptoms  Symptoms: no easy bleeding,-- no easy bruising,-- no frequent nosebleeds,-- no chest pain,-- no cough,-- no dyspnea,-- no edema,-- no palpitations-- and-- no wheezing  HPI: right buttock pain , radiating to the right leg, worse at night with lying flat, difficulty falling asleep due to pain11/17/18ECHO - Normal 3/2017medications include Nexium for acid reflux diseaseprednisone eye drops TID as per DR Benson not up-to-date with gynecological exam and mammographyis up-to-date with colonoscopyof recent blood work discussed, mild elevation of ALT, otherwise normaldid have right upper quadrant ultrasound in May of 2017 revealing fatty liver      Review of Systems   Constitutional: No fever, no chills, feels well, no tiredness, no recent weight gain or weight loss  Eyes: No complaints of eye pain, no red eyes, no eyesight problems, no discharge, no dry eyes, no itching of eyes  ENT: no complaints of earache, no loss of hearing, no nose bleeds, no nasal discharge, no sore throat, no hoarseness  Cardiovascular: No complaints of slow heart rate, no fast heart rate, no chest pain, no palpitations, no leg claudication, no lower extremity edema  Respiratory: No complaints of shortness of breath, no wheezing, no cough, no SOB on exertion, no orthopnea, no PND  Gastrointestinal: No complaints of abdominal pain, no constipation, no nausea or vomiting, no diarrhea, no bloody stools  Genitourinary: No complaints of dysuria, no incontinence, no pelvic pain, no dysmenorrhea, no vaginal discharge or bleeding  Musculoskeletal: as noted in HPI  Integumentary: No complaints of skin rash or lesions, no itching, no skin wounds, no breast pain or lump  Neurological: as noted in HPI  Psychiatric: Not suicidal, no sleep disturbance, no anxiety or depression, no change in personality, no emotional problems  Endocrine: No complaints of proptosis, no hot flashes, no muscle weakness, no deepening of the voice, no feelings of weakness  Hematologic/Lymphatic: No complaints of swollen glands, no swollen glands in the neck, does not bleed easily, does not bruise easily  Active Problems  1  Acute serous otitis media, unspecified laterality   2  Allergic rhinitis (477 9) (J30 9)   3  Saucedo's esophagus without dysplasia (530 85) (K22 70)   4  Bilateral ovarian cysts (620 2) (N83 201,N83 202)   5  Diverticulosis (562 10) (K57 90)   6  Elevated ALT measurement (790 4) (R74 0)   7  Esophageal reflux (530 81) (K21 9)   8  Generalized anxiety disorder (300 02) (F41 1)   9  Hepatic steatosis (571 8) (K76 0)   10  Hyperlipemia (272 4) (E78 5)   11  Infection, upper respiratory (465 9) (J06 9)   12  Irritable bowel syndrome (IBS) (564 1) (K58 9)   13  Local superficial swelling, mass or lump (782 2) (R22 9)   14  Mass (782 2) (R22 9)   15  Neuroma (215 9) (D36 10)   16  Pain, upper back (724 5) (M54 9)   17  Screening for colorectal cancer (V76 51) (Z12 11,Z12 12)   18  Visit for screening mammogram (V76 12) (Z12 31)    Past Medical History   · History of Continuous LUQ abdominal pain (789 02) (R10 12)   · History of abscess of skin and subcutaneous tissue (V13 3) (Z87 2)    The active problems and past medical history were reviewed and updated today        Surgical History   · History of Chemosurgery (Mohs Micrographic Technique)   · History of Complete Colonoscopy    The surgical history was reviewed and updated today  Family History  Mother    · Family history of colonic polyps (V18 51) (Z83 71)  Father    · Family history of Colon cancer    The family history was reviewed and updated today  Social History     · Employed   · Admitt assist    · High school or GED   · Lives with spouse   ·    · Never a smoker   · No illicit drug use   · Social drinker (V49 89) (Z78 9)   · Two children  The social history was reviewed and updated today  Current Meds   1  Esomeprazole Magnesium 40 MG Oral Capsule Delayed Release; TAKE ONE CAPSULE BY MOUTH EVERY DAY; Therapy: 51WQA4346 to ([facility]:[id number])  Requested for: 04RXE1433; Last Rx:42Psf2804 Ordered   2  Mometasone Furoate 50 MCG/ACT Nasal Suspension; 2 SPRAYS INTO EACH NOSTRIL DAILY; Therapy: 98GON3730 to (Evaluate:13Apr2018)  Requested for: 92INK4509; Last Rx:27Oct2017 Ordered   3  ZyrTEC Allergy CAPS; take 1 capsule daily; Therapy: (Recorded:11Oct2017) to Recorded    The medication list was reviewed and updated today  Allergies  1  Sulfa Drugs  2  Seasonal    Vitals   Recorded: 39EGC0702 03:07PM   Temperature 98 2 F   Heart Rate 88   Respiration 16   Systolic 706   Diastolic 76   Height 5 ft 3 in   Weight 180 lb    BMI Calculated 31 89   BSA Calculated 1 85       Physical Exam   Constitutional  General appearance: No acute distress, well appearing and well nourished  Neck  Neck: Supple, symmetric, trachea midline, no masses  Thyroid: Normal, no thyromegaly  Pulmonary  Respiratory effort: No increased work of breathing or signs of respiratory distress  Auscultation of lungs: Clear to auscultation  Cardiovascular  Auscultation of heart: Normal rate and rhythm, normal S1 and S2, no murmurs  Carotid pulses: 2+ bilaterally  Abdominal aorta: Normal    Examination of extremities for edema and/or varicosities: Normal    Chest  Chest: Normal    Abdomen  Abdomen: Non-tender, no masses     Musculoskeletal  Gait and station: Normal  -- Palpable mass right buttock  Neurologic  Cranial nerves: Cranial nerves II-XII intact  Psychiatric  Judgment and insight: Normal    Recent and remote memory: Intact  Mood and affect: Normal        Results/Data  A 12 lead ECG was performed and was normal -- No acute ischemia  Rhythm and rate: normal sinus rhythm  End of Encounter Meds    1  Mometasone Furoate 50 MCG/ACT Nasal Suspension (Nasonex); 2 SPRAYS INTO EACH NOSTRIL DAILY; Therapy: 52TQR6124 to (Evaluate:13Apr2018)  Requested for: 56GKD0240; Last Rx:27Oct2017 Ordered   2  ZyrTEC Allergy CAPS; take 1 capsule daily; Therapy: (Recorded:11Oct2017) to Recorded    3  Esomeprazole Magnesium 40 MG Oral Capsule Delayed Release; TAKE ONE CAPSULE BY MOUTH EVERY DAY; Therapy: 56FNM0175 to (VWENOSPR:[id number])  Requested for: 49YAW0991; Last Rx:27Dtd3100 Ordered    4  Hydrocodone-Acetaminophen 5-325 MG Oral Tablet; 1 tablet  at at bedtime as needed for pain; Therapy: 61UCR9983 to (Last SB:72BRN4345) Ordered    Future Appointments    Date/Time Provider Specialty Site   11/17/2017 07:45 AM FLORENCIO Young  [facility]   12/05/2017 04:15 PM FLORENCIO Young  Neurosurgery [facility] NEUROSURGICAL [facility]   12/28/2017 04:00 PM FLORENCIO Young  Neurosurgery [facility]       Signatures   Electronically signed by :  FLORENCIO Crenshaw ; Nov 8 2017 11:06AM EST                       (Author)

## 2017-11-10 ENCOUNTER — GENERIC CONVERSION - ENCOUNTER (OUTPATIENT)
Dept: OTHER | Facility: OTHER | Age: 52
End: 2017-11-10

## 2017-11-14 NOTE — PRE-PROCEDURE INSTRUCTIONS
Pre-Surgery Instructions:   Medication Instructions    acetaminophen (TYLENOL) 500 mg tablet Patient was instructed per "E-Preop"    ALPRAZolam (XANAX) 0 5 mg tablet Patient was instructed per "E-Preop"    cetirizine (ZyrTEC) 10 mg tablet Patient was instructed per "E-Preop"    HYDROcodone-acetaminophen (NORCO) 5-325 mg per tablet Patient was instructed per "E-Preop"    ibuprofen (MOTRIN) 200 mg tablet Patient was instructed per "E-Preop"    mometasone (NASONEX) 50 mcg/act nasal spray Patient was instructed per "E-Preop"    omeprazole (PriLOSEC) 40 MG capsule Patient was instructed per "E-Preop"   Pre op instructions reviewed; verbalized understanding   Medication Instruction (Benzodiazepine)   Please continue the following medications, if needed, up to and including the day of surgery (with a sip of water)  ALPRAZolam (Xanax)         NPO Instructions   Please do not eat or drink anything prior to your surgery as follows: For AM Surgery times = stop at midnight the night before  For PM Surgery times = Midnight to 8AM clear liquids only (Jello, broth, 7up, Sprite, apple juice, black coffee, black tea, Gatorade)  If you are supposed to take any of your medications, do so with a sip of water  Failure to follow these instructions can lead to an increased risk of lung complications and may result in a delay or cancellation of your procedure  If you have any questions, contact your institution for further instructions  Medical Procedure Risk         Medication Instruction (NSAID - Pain Medication)   Please stop ibuprofen, naproxen and other non-steroidal anti-inflammatory drugs (NSAIDS) for 1 week before surgery  ibuprofen         Medication Instruction (Reflux Disease)   Please continue to take this medication on your normal schedule  If this is an oral medication and you take in the morning, you may do so with a sip of water     Esophageal reflux

## 2017-11-16 ENCOUNTER — GENERIC CONVERSION - ENCOUNTER (OUTPATIENT)
Dept: OTHER | Facility: OTHER | Age: 52
End: 2017-11-16

## 2017-11-16 ENCOUNTER — ANESTHESIA EVENT (OUTPATIENT)
Dept: PERIOP | Facility: HOSPITAL | Age: 52
End: 2017-11-16
Payer: COMMERCIAL

## 2017-11-17 ENCOUNTER — APPOINTMENT (OUTPATIENT)
Dept: RADIOLOGY | Facility: HOSPITAL | Age: 52
End: 2017-11-17
Payer: COMMERCIAL

## 2017-11-17 ENCOUNTER — HOSPITAL ENCOUNTER (OUTPATIENT)
Facility: HOSPITAL | Age: 52
Setting detail: SURGERY ADMIT
Discharge: HOME | End: 2017-11-17
Attending: NEUROLOGICAL SURGERY | Admitting: NEUROLOGICAL SURGERY
Payer: COMMERCIAL

## 2017-11-17 ENCOUNTER — ANESTHESIA (OUTPATIENT)
Dept: PERIOP | Facility: HOSPITAL | Age: 52
End: 2017-11-17
Payer: COMMERCIAL

## 2017-11-17 VITALS
SYSTOLIC BLOOD PRESSURE: 116 MMHG | HEART RATE: 80 BPM | HEIGHT: 63 IN | BODY MASS INDEX: 31.89 KG/M2 | DIASTOLIC BLOOD PRESSURE: 80 MMHG | OXYGEN SATURATION: 96 % | WEIGHT: 180 LBS | TEMPERATURE: 99.4 F | RESPIRATION RATE: 16 BRPM

## 2017-11-17 DIAGNOSIS — D36.10 BENIGN NEOPLASM OF PERIPHERAL NERVES AND AUTONOMIC NERVOUS SYSTEM, UNSPECIFIED (CODE): ICD-10-CM

## 2017-11-17 LAB
ABO GROUP BLD: NORMAL
BLD GP AB SCN SERPL QL: NEGATIVE
GLUCOSE SERPL-MCNC: 125 MG/DL (ref 65–140)
RH BLD: NEGATIVE
SPECIMEN EXPIRATION DATE: NORMAL

## 2017-11-17 PROCEDURE — 88331 PATH CONSLTJ SURG 1 BLK 1SPC: CPT | Performed by: NEUROLOGICAL SURGERY

## 2017-11-17 PROCEDURE — 88307 TISSUE EXAM BY PATHOLOGIST: CPT | Performed by: NEUROLOGICAL SURGERY

## 2017-11-17 PROCEDURE — 86850 RBC ANTIBODY SCREEN: CPT | Performed by: NEUROLOGICAL SURGERY

## 2017-11-17 PROCEDURE — 88342 IMHCHEM/IMCYTCHM 1ST ANTB: CPT

## 2017-11-17 PROCEDURE — 82948 REAGENT STRIP/BLOOD GLUCOSE: CPT

## 2017-11-17 PROCEDURE — 86901 BLOOD TYPING SEROLOGIC RH(D): CPT | Performed by: NEUROLOGICAL SURGERY

## 2017-11-17 PROCEDURE — 88341 IMHCHEM/IMCYTCHM EA ADD ANTB: CPT

## 2017-11-17 PROCEDURE — 86900 BLOOD TYPING SEROLOGIC ABO: CPT | Performed by: NEUROLOGICAL SURGERY

## 2017-11-17 RX ORDER — METHOCARBAMOL 500 MG/1
500 TABLET, FILM COATED ORAL 4 TIMES DAILY
Qty: 60 TABLET | Refills: 0 | Status: SHIPPED | OUTPATIENT
Start: 2017-11-17 | End: 2018-02-06

## 2017-11-17 RX ORDER — FENTANYL CITRATE 50 UG/ML
25 INJECTION, SOLUTION INTRAMUSCULAR; INTRAVENOUS
Status: DISCONTINUED | OUTPATIENT
Start: 2017-11-17 | End: 2017-11-18 | Stop reason: HOSPADM

## 2017-11-17 RX ORDER — SODIUM CHLORIDE 9 MG/ML
INJECTION, SOLUTION INTRAVENOUS CONTINUOUS PRN
Status: DISCONTINUED | OUTPATIENT
Start: 2017-11-17 | End: 2017-11-17 | Stop reason: SURG

## 2017-11-17 RX ORDER — EPHEDRINE SULFATE 50 MG/ML
INJECTION, SOLUTION INTRAVENOUS AS NEEDED
Status: DISCONTINUED | OUTPATIENT
Start: 2017-11-17 | End: 2017-11-17 | Stop reason: SURG

## 2017-11-17 RX ORDER — ONDANSETRON 2 MG/ML
4 INJECTION INTRAMUSCULAR; INTRAVENOUS EVERY 6 HOURS PRN
Status: DISCONTINUED | OUTPATIENT
Start: 2017-11-17 | End: 2017-11-18 | Stop reason: HOSPADM

## 2017-11-17 RX ORDER — CHLORHEXIDINE GLUCONATE 0.12 MG/ML
15 RINSE ORAL ONCE
Status: COMPLETED | OUTPATIENT
Start: 2017-11-17 | End: 2017-11-17

## 2017-11-17 RX ORDER — SODIUM CHLORIDE, SODIUM LACTATE, POTASSIUM CHLORIDE, CALCIUM CHLORIDE 600; 310; 30; 20 MG/100ML; MG/100ML; MG/100ML; MG/100ML
INJECTION, SOLUTION INTRAVENOUS CONTINUOUS PRN
Status: DISCONTINUED | OUTPATIENT
Start: 2017-11-17 | End: 2017-11-17 | Stop reason: SURG

## 2017-11-17 RX ORDER — MIDAZOLAM HYDROCHLORIDE 1 MG/ML
INJECTION INTRAMUSCULAR; INTRAVENOUS AS NEEDED
Status: DISCONTINUED | OUTPATIENT
Start: 2017-11-17 | End: 2017-11-17 | Stop reason: SURG

## 2017-11-17 RX ORDER — HYDROMORPHONE HYDROCHLORIDE 2 MG/ML
INJECTION, SOLUTION INTRAMUSCULAR; INTRAVENOUS; SUBCUTANEOUS AS NEEDED
Status: DISCONTINUED | OUTPATIENT
Start: 2017-11-17 | End: 2017-11-17 | Stop reason: SURG

## 2017-11-17 RX ORDER — HYDROCODONE BITARTRATE AND ACETAMINOPHEN 5; 325 MG/1; MG/1
1 TABLET ORAL EVERY 4 HOURS PRN
Qty: 20 TABLET | Refills: 0 | Status: SHIPPED | OUTPATIENT
Start: 2017-11-17 | End: 2018-02-06

## 2017-11-17 RX ORDER — METHOCARBAMOL 500 MG/1
500 TABLET, FILM COATED ORAL EVERY 6 HOURS SCHEDULED
Status: DISCONTINUED | OUTPATIENT
Start: 2017-11-17 | End: 2017-11-18 | Stop reason: HOSPADM

## 2017-11-17 RX ORDER — FENTANYL CITRATE 50 UG/ML
INJECTION, SOLUTION INTRAMUSCULAR; INTRAVENOUS AS NEEDED
Status: DISCONTINUED | OUTPATIENT
Start: 2017-11-17 | End: 2017-11-17 | Stop reason: SURG

## 2017-11-17 RX ORDER — HYDROCODONE BITARTRATE AND ACETAMINOPHEN 5; 325 MG/1; MG/1
1 TABLET ORAL EVERY 6 HOURS PRN
Qty: 20 TABLET | Refills: 0 | Status: SHIPPED | OUTPATIENT
Start: 2017-11-17 | End: 2017-11-27

## 2017-11-17 RX ORDER — PROPOFOL 10 MG/ML
INJECTION, EMULSION INTRAVENOUS AS NEEDED
Status: DISCONTINUED | OUTPATIENT
Start: 2017-11-17 | End: 2017-11-17 | Stop reason: SURG

## 2017-11-17 RX ORDER — PROPOFOL 10 MG/ML
INJECTION, EMULSION INTRAVENOUS CONTINUOUS PRN
Status: DISCONTINUED | OUTPATIENT
Start: 2017-11-17 | End: 2017-11-17 | Stop reason: SURG

## 2017-11-17 RX ORDER — BUPIVACAINE HYDROCHLORIDE AND EPINEPHRINE 5; 5 MG/ML; UG/ML
INJECTION, SOLUTION EPIDURAL; INTRACAUDAL; PERINEURAL AS NEEDED
Status: DISCONTINUED | OUTPATIENT
Start: 2017-11-17 | End: 2017-11-17 | Stop reason: HOSPADM

## 2017-11-17 RX ORDER — FENTANYL CITRATE/PF 50 MCG/ML
25 SYRINGE (ML) INJECTION
Status: COMPLETED | OUTPATIENT
Start: 2017-11-17 | End: 2017-11-17

## 2017-11-17 RX ORDER — ACETAMINOPHEN 325 MG/1
650 TABLET ORAL EVERY 4 HOURS PRN
Status: DISCONTINUED | OUTPATIENT
Start: 2017-11-17 | End: 2017-11-18 | Stop reason: HOSPADM

## 2017-11-17 RX ORDER — LIDOCAINE HYDROCHLORIDE AND EPINEPHRINE 10; 10 MG/ML; UG/ML
INJECTION, SOLUTION INFILTRATION; PERINEURAL AS NEEDED
Status: DISCONTINUED | OUTPATIENT
Start: 2017-11-17 | End: 2017-11-17 | Stop reason: HOSPADM

## 2017-11-17 RX ORDER — ACETAMINOPHEN 325 MG/1
650 TABLET ORAL EVERY 6 HOURS PRN
Status: DISCONTINUED | OUTPATIENT
Start: 2017-11-17 | End: 2017-11-18 | Stop reason: HOSPADM

## 2017-11-17 RX ORDER — HYDROCODONE BITARTRATE AND ACETAMINOPHEN 5; 325 MG/1; MG/1
1 TABLET ORAL EVERY 4 HOURS PRN
Status: DISCONTINUED | OUTPATIENT
Start: 2017-11-17 | End: 2017-11-18 | Stop reason: HOSPADM

## 2017-11-17 RX ORDER — ONDANSETRON 2 MG/ML
INJECTION INTRAMUSCULAR; INTRAVENOUS AS NEEDED
Status: DISCONTINUED | OUTPATIENT
Start: 2017-11-17 | End: 2017-11-17 | Stop reason: SURG

## 2017-11-17 RX ADMIN — EPHEDRINE SULFATE 15 MG: 50 INJECTION, SOLUTION INTRAMUSCULAR; INTRAVENOUS; SUBCUTANEOUS at 07:40

## 2017-11-17 RX ADMIN — PROPOFOL 100 MCG/KG/MIN: 10 INJECTION, EMULSION INTRAVENOUS at 07:37

## 2017-11-17 RX ADMIN — SODIUM CHLORIDE, SODIUM LACTATE, POTASSIUM CHLORIDE, AND CALCIUM CHLORIDE: .6; .31; .03; .02 INJECTION, SOLUTION INTRAVENOUS at 11:30

## 2017-11-17 RX ADMIN — HYDROMORPHONE HYDROCHLORIDE 0.5 MG: 2 INJECTION, SOLUTION INTRAMUSCULAR; INTRAVENOUS; SUBCUTANEOUS at 11:20

## 2017-11-17 RX ADMIN — SODIUM CHLORIDE, SODIUM LACTATE, POTASSIUM CHLORIDE, AND CALCIUM CHLORIDE: .6; .31; .03; .02 INJECTION, SOLUTION INTRAVENOUS at 07:27

## 2017-11-17 RX ADMIN — DEXMEDETOMIDINE HYDROCHLORIDE 0.2 MCG/KG/HR: 100 INJECTION, SOLUTION INTRAVENOUS at 07:37

## 2017-11-17 RX ADMIN — MIDAZOLAM HYDROCHLORIDE 2 MG: 1 INJECTION, SOLUTION INTRAMUSCULAR; INTRAVENOUS at 07:27

## 2017-11-17 RX ADMIN — DEXAMETHASONE SODIUM PHOSPHATE 10 MG: 10 INJECTION INTRAMUSCULAR; INTRAVENOUS at 07:48

## 2017-11-17 RX ADMIN — HYDROMORPHONE HYDROCHLORIDE 0.25 MG: 1 INJECTION, SOLUTION INTRAMUSCULAR; INTRAVENOUS; SUBCUTANEOUS at 11:07

## 2017-11-17 RX ADMIN — FENTANYL CITRATE 25 MCG: 50 INJECTION INTRAMUSCULAR; INTRAVENOUS at 12:13

## 2017-11-17 RX ADMIN — PROPOFOL 200 MG: 10 INJECTION, EMULSION INTRAVENOUS at 07:37

## 2017-11-17 RX ADMIN — SODIUM CHLORIDE: 0.9 INJECTION, SOLUTION INTRAVENOUS at 07:45

## 2017-11-17 RX ADMIN — REMIFENTANIL HYDROCHLORIDE 0.2 MCG/KG/MIN: 1 INJECTION, POWDER, LYOPHILIZED, FOR SOLUTION INTRAVENOUS at 07:37

## 2017-11-17 RX ADMIN — EPHEDRINE SULFATE 10 MG: 50 INJECTION, SOLUTION INTRAMUSCULAR; INTRAVENOUS; SUBCUTANEOUS at 07:53

## 2017-11-17 RX ADMIN — HYDROMORPHONE HYDROCHLORIDE 0.5 MG: 2 INJECTION, SOLUTION INTRAMUSCULAR; INTRAVENOUS; SUBCUTANEOUS at 11:08

## 2017-11-17 RX ADMIN — FENTANYL CITRATE 25 MCG: 50 INJECTION INTRAMUSCULAR; INTRAVENOUS at 12:19

## 2017-11-17 RX ADMIN — ONDANSETRON 4 MG: 2 INJECTION INTRAMUSCULAR; INTRAVENOUS at 16:39

## 2017-11-17 RX ADMIN — FENTANYL CITRATE 200 MCG: 50 INJECTION, SOLUTION INTRAMUSCULAR; INTRAVENOUS at 07:37

## 2017-11-17 RX ADMIN — HYDROCODONE BITARTRATE AND ACETAMINOPHEN 1 TABLET: 5; 325 TABLET ORAL at 14:00

## 2017-11-17 RX ADMIN — CHLORHEXIDINE GLUCONATE 15 ML: 1.2 RINSE ORAL at 06:01

## 2017-11-17 RX ADMIN — EPHEDRINE SULFATE 5 MG: 50 INJECTION, SOLUTION INTRAMUSCULAR; INTRAVENOUS; SUBCUTANEOUS at 08:15

## 2017-11-17 RX ADMIN — CEFAZOLIN SODIUM 2000 MG: 2 SOLUTION INTRAVENOUS at 08:15

## 2017-11-17 RX ADMIN — ONDANSETRON 4 MG: 2 INJECTION INTRAMUSCULAR; INTRAVENOUS at 07:27

## 2017-11-17 RX ADMIN — FENTANYL CITRATE 25 MCG: 50 INJECTION INTRAMUSCULAR; INTRAVENOUS at 12:40

## 2017-11-17 RX ADMIN — FENTANYL CITRATE 25 MCG: 50 INJECTION INTRAMUSCULAR; INTRAVENOUS at 12:36

## 2017-11-17 NOTE — OP NOTE
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]    Procedure(s) (LRB):  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 (FROZEN SECTION) (Right)    Specimen(s):  ID Type Source Tests Collected by Time Destination   1 : Right sacral nerve mass Tissue Mass TISSUE Faye Sow MD 2017 5243    2 : Right sacral nerve mass Tissue Mass TISSUE EXAM Malik Henderson MD 2017 1101        Estimated Blood Loss:   25 mL    Drains:   7 mm Phi-Piper drain    Anesthesia Type:   General    Operative Indications:  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, 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]      Operative Findings:  Large fibrous mass emanating from the inferior piriformis nerve at the takeoff to the sciatic nerve complex    Complications:   None    Procedure and Technique:  After adequate general endotracheal anesthesia the patient was placed lateral on the operating table  The right buttocks region was prepped with Betadine soap then DuraPrep  Double layer drapes were placed in normal fashion and 0300 hours Betadine impregnated sticky drape was placed over these  A time-out was called and all parameters a time-out were followed    The skin in this region was injected with 1% lidocaine with 1 100,000 epinephrine  A 15  Blade was used to incise the skin  Bovie and bipolar were used throughout the procedure to maintain hemostasis  The Bovie on the cutting setting was used to divide the tissues to the mass  Immediately fluid egressed which represented a postoperative hygroma    Meticulous detail to hemostasis was carried out  A cerebellar style we later retractor was used medial laterally and superior inferiorly a Adson Akosua retractor was utilized  The dissection was carried out very tedious lead to circumferentially identified the tumor  There was a pseudo capsule and scar superiorly and laterally which was quite severe  During this time somatosensory-evoked potentials of the sciatic nerve were recorded  In addition spontaneous EMG was recorded  There were no alterations in these throughout the case  The Cavitron ultrasonic aspirated was utilized to debulk the tumor from within  Portions of the tumor were sent to the laboratory as specimen  These were returned as being 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  This was consistent with a necrosis in a nerve sheath tumor but not diagnostic for it  Eventually dissection was carried out to expose the tumors attachment point which was relatively small just under the piriformis muscle at the takeoff of a branch from the sciatic nerve  The sciatic nerve itself did not appear to be affected by the mass the mass was relatively resected from that nerve to the piriformis muscle after the scar had been taken down  The remainder of the tumor was sent to the laboratory as specimen  Copious quantities of irrigation were used to cleanse out the region  Antibiotics were utilized to irrigate the surrounding tissues  Somatosensory-evoked potentials were again performed which demonstrated no alteration from baseline  A 7 mm flat Phi-Piper drain was placed  This was tunneled laterally  The muscle was reapproximated with interrupted inverted to 0 Vicryl suture this represented a small component of the scar which had been splayed by the mass    The fat was then approximated with interrupted inverted to 0 Vicryl suture the skin was closed with interrupted inverted to 0 Vicryl suture and interrupted vertical mattress 3 0 Vicryl sutures  Clean sterile dressings were placed  The patient was able to dorsiflex and plantar flex her foot postoperatively  She was taken to the recovery room having tolerated the procedure well  I was present for the entire procedure    Patient Disposition:  PACU      The dimensions of the tumor were 7cm by 6cm by 4cm  This matched the dimensions seen on the recent MRI  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

## 2017-11-17 NOTE — ANESTHESIA PREPROCEDURE EVALUATION
Review of Systems/Medical History  Patient summary reviewed    History of anesthetic complications (patient denies, patient did receive full general anesthesia at [facility] [address] 8/2017 without any complications  patient denies any family history)     Cardiovascular  Hyperlipidemia,    Pulmonary  Negative pulmonary ROS ,        GI/Hepatic    GERD well controlled, Liver disease (fatty liver), ,        Negative  ROS        Endo/Other  Negative endo/other ROS      GYN       Hematology  Negative hematology ROS      Musculoskeletal    Comment: Right leg pain/numbness down back right leg      Neurology    Paresthesias,    Psychology   Anxiety,            Physical Exam    Airway    Mallampati score: II  TM Distance: >3 FB  Neck ROM: full     Dental       Cardiovascular  Cardiovascular exam normal    Pulmonary      Other Findings  Teeth intact      Anesthesia Plan  ASA Score- 2       Anesthesia Type- general with ASA Monitors  Additional Monitors:   Airway Plan: ETT  Comment: TIVA, second IV     No results found for: WBC, HGB, PLT  No results found for: NA, K, BUN, CREATININE, GLUCOSE  No results found for: PTT   No results found for: INR    Blood type A    No results found for: HGBA1C  General anesthesia, endotracheal tube; standard ASA monitors  Risks and benefits discussed with patient; patient consented and agrees to proceed  I saw and evaluated the patient  If seen with CRNA, we have discussed the anesthetic plan and I am in agreement that the plan is appropriate for the patient  Pt denies any personal or family history of MH; she does not know how it would have been placed in her history on EPIC  No need for MH precautions  TIVA for neuromonitoring  Post-menopausal        Induction- intravenous  Informed Consent- Anesthetic plan and risks discussed with patient

## 2017-11-17 NOTE — DISCHARGE INSTRUCTIONS
Please keep incision clean and dry  Do not bathe  May shower on Monday  Remove the drain tomorrow (I spoke with the daughter, a nurse, regarding removing this)  Take short frequent walks  Do not participate in high progressive activities  Phi-Piper Drain Care   WHAT YOU NEED TO KNOW:   A Phi-Piper (CLAUDIA) drain is used to remove fluids that build up in an area of your body after surgery  The CLAUDIA drain is a bulb shaped device connected to a tube  One end of the tube is placed inside you during surgery  The other end comes out through a small cut in your skin  The bulb is connected to this end  You may have a stitch to hold the tube in place  DISCHARGE INSTRUCTIONS:   Seek care immediately if:   · Your CLAUDIA drain breaks or comes out  · You have cloudy yellow or brown drainage from your CLAUDIA drain site, or the drainage smells bad  Contact your healthcare provider if:   · You drain less than 30 milliliters (2 tablespoons) in 24 hours  This may mean your drain can be removed  · You suddenly stop draining fluid or think your CLAUDIA drain is blocked  · You have a fever higher than 101 5°F (38 6°C)  · You have increased pain, redness, or swelling around the drain site  · You have questions about your CLAUDIA drain care  How a Phi-Piper drain works: The CLAUDIA drain removes fluids by creating suction in the tube  The bulb is squeezed flat and connected to the tube that sticks out of your body  The bulb expands as it fills with fluid  How to change the bandage around your Phi-Piper drain:  If you have a bandage, change it once a day  You may need to change your bandage more than once a day if it gets completely wet  · Wash your hands with soap and water  · Loosen the tape and gently remove the old bandage  Throw the old bandage into a plastic trash bag  · Use soap and water or saline (salt water) solution to clean your CLAUDIA drain site   Dip a cotton swab or gauze pad in the solution and gently clean your skin  · Pat the area dry  · Place a new bandage on your CLAUDIA drain site and secure it to your skin with medical tape  · Wash your hands  How to empty the Phi-Piper drain:  Empty the bulb when it is half full or every 8 to 12 hours  · Wash your hands with soap and water  · Remove the plug from the bulb  · Pour the fluid into a measuring cup  · Clean the plug with an alcohol swab or a cotton ball dipped in rubbing alcohol  · Squeeze the bulb flat and put the plug back in  The bulb should stay flat until it starts to fill with fluid again  · Measure the amount of fluid you pour out  Write down how much fluid you empty from the CLAUDIA drain and the date and time you collected it  · Flush the fluid down the toilet  Wash your hands  Clear clogged tubing: Use the following steps to clear your Phi-Piper tubing:  · Hold the tubing between your thumb and first finger at the place closest to your skin  This hand will prevent the tube from being pulled out of your skin  · Use your other thumb and first finger to slide the clog down the tubing toward the bulb  You may have to repeat the sliding until the tubing is unclogged  Phi-Piper drain removal:  The amount of fluid that you drain will decrease as your wound heals  The CLAUDIA drain usually is removed when less than 30 milliliters (2 tablespoons) is collected in 24 hours  Ask your healthcare provider when and how your CLAUDIA drain will be removed  Follow up with your healthcare provider as directed:  Write down your questions so you remember to ask them during your visits  © 2017 [employer] Information is for End User's use only and may not be sold, redistributed or otherwise used for commercial purposes  All illustrations and images included in CareNotes® are the copyrighted property of A D A M , Inc  or [employer]  The above information is an  only   It is not intended as medical advice for individual conditions or treatments  Talk to your doctor, nurse or pharmacist before following any medical regimen to see if it is safe and effective for you

## 2017-11-17 NOTE — ANESTHESIA POSTPROCEDURE EVALUATION
Post-Op Assessment Note      CV Status:  Stable    Mental Status:  Alert and awake    Hydration Status:  Euvolemic    PONV Controlled:  Controlled    Airway Patency:  Patent    Post Op Vitals Reviewed: Yes          Staff: CRNA           BP   90/61   Temp   98 1   Pulse 73   Resp 16   SpO2 98%

## 2017-11-17 NOTE — PROGRESS NOTES
Camilla Mejia, Neurology PA-C to Jamar Guzman 27 Post Op to evaluate patient and provide pain prescription

## 2017-11-18 NOTE — PROGRESS NOTES
Telephone call to patient  She is doing very well  She has no complaints  She is ambulating without any motor deficits  Her sensation is intact she has no numbness or tingling  Her total output from the drain was 30 mL yesterday and 15 mL today      Her daughter will remove the drain later today or I offered to have this done on Monday

## 2017-11-21 ENCOUNTER — GENERIC CONVERSION - ENCOUNTER (OUTPATIENT)
Dept: OTHER | Facility: OTHER | Age: 52
End: 2017-11-21

## 2017-12-05 ENCOUNTER — GENERIC CONVERSION - ENCOUNTER (OUTPATIENT)
Dept: OTHER | Facility: OTHER | Age: 52
End: 2017-12-05

## 2017-12-07 ENCOUNTER — ALLSCRIPTS OFFICE VISIT (OUTPATIENT)
Dept: OTHER | Facility: OTHER | Age: 52
End: 2017-12-07

## 2017-12-12 ENCOUNTER — ALLSCRIPTS OFFICE VISIT (OUTPATIENT)
Dept: OTHER | Facility: OTHER | Age: 52
End: 2017-12-12

## 2017-12-13 NOTE — PROGRESS NOTES
Assessment    1  Sinusitis (473 9) (J32 9)    Plan  Sinusitis    · Benzonatate 200 MG Oral Capsule; TAKE 1 CAPSULE 3 TIMES DAILY ASNEEDED   · Cefuroxime Axetil 500 MG Oral Tablet; one po bid    Discussion/Summary    Sinusitis  Patient given prescription for cefuroxime to use as directed as well as Tessalon Perles for her cough  Patient will call if symptoms persist after medication completed  Chief Complaint  Pt states that she began to not feel well on Friday  Pt is c/o coughing, stuffiness, congestion, and sore throat  History of Present Illness  HPI: I reviewed chief complaint with the patient  Review of Systems   Constitutional: feeling tired  ENT: as noted in HPI  Cardiovascular: no complaints of slow or fast heart rate, no chest pain, no palpitations, no leg claudication or lower extremity edema  Respiratory: as noted in HPI  Gastrointestinal: no complaints of abdominal pain, no constipation, no nausea or diarrhea, no vomiting, no bloody stools  Genitourinary: no complaints of dysuria, no incontinence, no pelvic pain, no dysmenorrhea, no vaginal discharge or abnormal vaginal bleeding  Integumentary: no complaints of skin rash or lesion, no itching or dry skin, no skin wounds  Active Problems  1  Acute serous otitis media, unspecified laterality   2  Allergic rhinitis (477 9) (J30 9)   3  Anxiety (300 00) (F41 9)   4  Saucedo's esophagus without dysplasia (530 85) (K22 70)   5  Bilateral ovarian cysts (620 2) (N83 201,N83 202)   6  Diverticulosis (562 10) (K57 90)   7  Elevated ALT measurement (790 4) (R74 0)   8  Esophageal reflux (530 81) (K21 9)   9  Generalized anxiety disorder (300 02) (F41 1)   10  Hepatic steatosis (571 8) (K76 0)   11  Hyperlipemia (272 4) (E78 5)   12  Infection, upper respiratory (465 9) (J06 9)   13  Irritable bowel syndrome (IBS) (564 1) (K58 9)   14  Local superficial swelling, mass or lump (782 2) (R22 9)   15  Mass (782 2) (R22 9)   16   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)   17  Neuroma (215 9) (D36 10)   18  Pain, upper back (724 5) (M54 9)   19  Preoperative clearance (V72 84) (Z01 818)   20  Screening for colorectal cancer (V76 51) (Z12 11,Z12 12)   21  Visit for screening mammogram (V76 12) (Z12 31)    Past Medical History  1  History of Continuous LUQ abdominal pain (789 02) (R10 12)   2  History of abscess of skin and subcutaneous tissue (V13 3) (Z87 2)    Family History  Mother    1  Family history of colonic polyps (V18 51) (Z83 71)  Father    2  Family history of Colon cancer    Social History   · Employed   · Admitt assist    · High school or GED   · Lives with spouse   ·    · Never a smoker   · No illicit drug use   · Social drinker (V49 89) (Z78 9)   · Two children  The social history was reviewed and updated today  Surgical History    1  History of Chemosurgery (Mohs Micrographic Technique)   2  History of Complete Colonoscopy    Current Meds   1  Acetaminophen 500 MG Oral Tablet; TAKE 1 TABLET EVERY 6 HOURS AS NEEDED; Therapy: (Recorded:19Jpe0636) to Recorded   2  ALPRAZolam 0 5 MG Oral Tablet; take 1 to 2 tablets every 4 hours as needed; Therapy: (Recorded:01Kjd6091) to Recorded   3  Esomeprazole Magnesium 40 MG Oral Capsule Delayed Release; TAKE ONE CAPSULE BY MOUTH EVERY DAY; Therapy: 18CNL4505 to (MOVSPEDQ:75SID0184)  Requested for: 11NIG0190; Last Rx:58Ipc7557 Ordered   4  Mometasone Furoate 50 MCG/ACT Nasal Suspension; 2 SPRAYS INTO EACH NOSTRIL DAILY; Therapy: 67VAA2622 to (Evaluate:43Jwx1452)  Requested for: 82SHS4422; Last CU:06WTH2258 Ordered   5  ZyrTEC Allergy 10 MG Oral Capsule; take 1 capsule daily; Therapy: (Recorded:05Neo4414) to Recorded    The medication list was reviewed and updated today  Allergies  1  Sulfa Drugs  2   Seasonal    Vitals   Recorded: 92Ruf0466 06:19PM   Temperature 97 1 F    Heart Rate 76    Systolic 226    Diastolic 74    Patient Refused Height Yes Yes   Patient Refused Weight Yes Yes Physical Exam   Constitutional  General appearance: No acute distress, well appearing and well nourished  Ears, Nose, Mouth, and Throat  External inspection of ears and nose: Normal    Otoscopic examination: Tympanic membranes translucent with normal light reflex  Canals patent without erythema  Oropharynx: Normal with no erythema, edema, exudate or lesions  -- hoarse voice  Pulmonary  Respiratory effort: No increased work of breathing or signs of respiratory distress  Auscultation of lungs: Clear to auscultation  -- Harsh cough  Cardiovascular  Auscultation of heart: Normal rate and rhythm, normal S1 and S2, without murmurs  Lymphatic  Palpation of lymph nodes in neck: No lymphadenopathy  Future Appointments    Date/Time Provider Specialty Site   02/06/2018 04:00 PM FLORENCIO Mathews   Neurosurgery [facility] NEUROSURGICAL [facility]       Signatures   Electronically signed by : FLORENCIO Bell ; Dec 12 2398  8:32PM EST                       (Author)

## 2017-12-26 ENCOUNTER — GENERIC CONVERSION - ENCOUNTER (OUTPATIENT)
Dept: OTHER | Facility: OTHER | Age: 52
End: 2017-12-26

## 2018-01-09 ENCOUNTER — TRANSCRIBE ORDERS (OUTPATIENT)
Dept: ADMINISTRATIVE | Facility: HOSPITAL | Age: 53
End: 2018-01-09

## 2018-01-09 DIAGNOSIS — D21.21 BENIGN TUMOR OF SOFT TISSUES OF LOWER LIMB, RIGHT: Primary | ICD-10-CM

## 2018-01-10 NOTE — MISCELLANEOUS
09/21/2017         Yunior Berrios was seen in our office today for an apppointment , please excuse her for coming in late               Nilson Burris MD  FACS

## 2018-01-10 NOTE — MISCELLANEOUS
Message  S/w pt on telephone POD 4  She reports that she is doing well overall and denies any incisional issues or fevers  She has some incisional soreness, but she is no longer taking any pain medication  Verified date/time/location of her upcoming POV and advised her to call the office with any further questions or concerns, or if any incisional issues or fevers would arise  Went over incision care with patient and she had no further questions at this time  She was appreciative for the call        Signatures   Electronically signed by : Darrell Cantrell RN; Nov 21 2017  1:59PM EST                       (Author)

## 2018-01-11 NOTE — MISCELLANEOUS
Message   Recorded as Task   Date: 07/24/2017 12:42 PM, Created By: Samantha Ramirez   Task Name: Go to Result   Assigned To: [facility],Team   Regarding Patient: Keyana Roman, Status: Active   CommentDurand Bustard - 24 Jul 2017 12:42 PM     TASK CREATED  Have patient seen in the office again to schedule surgery and excision if they wish  David Guillen - 25 Jul 2017 12:45 PM     TASK EDITED  Called and left a message for patient to return a call to the office  Patient did an MRI 7/21 and as per Dr Emelina Francisco, patient is to be seen in the office again to schedule surgery and excision if they wish  Peggy Escobar - 26 Jul 2017 8:40 AM     TASK EDITED  Patient called back and would like to proceed with surgery  Patient has an appointment with Dr Elías Baxter on Monday 08/07/2017 at 3:30pm at [facility] office  Active Problems    1  Acute serous otitis media, unspecified laterality   2  Allergic rhinitis (477 9) (J30 9)   3  Saucedo's esophagus without dysplasia (530 85) (K22 70)   4  Elevated ALT measurement (790 4) (R74 0)   5  Esophageal reflux (530 81) (K21 9)   6  Generalized anxiety disorder (300 02) (F41 1)   7  Hepatic steatosis (571 8) (K76 0)   8  Infection, upper respiratory (465 9) (J06 9)   9  Irritable bowel syndrome (IBS) (564 1) (K58 9)   10  Local superficial swelling, mass or lump (782 2) (R22 9)   11  Mass (782 2) (R22 9)   12  Pain, upper back (724 5) (M54 9)   13  Screening for colorectal cancer (V76 51) (Z12 11,Z12 12)   14  Sebaceous cyst (706 2) (L72 3)   15  Visit for screening mammogram (V76 12) (Z12 31)    Current Meds   1  ALPRAZolam 0 5 MG Oral Tablet; take 1 tablet every 4 hours prn anxiety; Therapy: 70QEC1989 to (Evaluate:87Inp4195); Last Rx:25Apr2017 Ordered   2  Esomeprazole Magnesium 40 MG Oral Capsule Delayed Release; TAKE ONE   CAPSULE BY MOUTH EVERY DAY;    Therapy: 18QQY7799 to (Evaluate:31Xlj9071)  Requested for: 93JZE9886; Last   LY:82XBC1072 Ordered   3  Melatonin 1 MG Oral Capsule; Therapy: 45BLS9923 to Recorded   4  Nasonex 50 MCG/ACT Nasal Suspension (Mometasone Furoate); 2 SPRAYS INTO   EACH NOSTRIL DAILY; Therapy: 95DWE6968 to (Evaluate:81Pkp3365)  Requested for: 46Gov9898; Last   Rx:08Dky4100 Ordered   5  ZyrTEC Allergy CAPS; Therapy: (Recorded:29Jun2017) to Recorded    Allergies    1   Sulfa Drugs    Signatures   Electronically signed by : Caroline Porter, ; Jul 26 2017  8:41AM EST                       (Author)

## 2018-01-11 NOTE — MISCELLANEOUS
Message  Return to work or school:   Ravidner Frederick is under my professional care  She was seen in my office on 8/7/17       Pt had surgery on 8/30/17 and had a post surgery visit on 9/11/17  Was able to return to work on 9/29/17          Signatures   Electronically signed by : Sandeep Corado, ; Sep 20 2017  8:58AM EST                       (Author)    Electronically signed by : Oanh Busch, [facility]; Sep 21 2017 11:15AM EST                       (Author)

## 2018-01-11 NOTE — MISCELLANEOUS
10/19/2017     Andree Patel was seen in our office today , as well as 09/21/2017 please put this under her FMLA   Carin Doherty MD FACS         Electronically signed by:Emigdio RUBIN    Oct 19 2017  5:37PM EST

## 2018-01-11 NOTE — RESULT NOTES
Discussion/Summary   Esophageal biopsies did not confirm the Cesar's esophagus as was seen on the endoscopy  I would like to repeat EGD in 2 years  The colon biopsies were completely bening  Due to family history I would recommend repeating the colonosocpy in 5 years  Verified Results  (1) TISSUE EXAM 02Jun2017 10:34AM Nini Sis     Test Name Result Flag Reference   LAB AP CASE REPORT (Report)     Surgical Pathology Report             Case: [id number]                   Authorizing Provider: Lobo Carter MD       Collected:      06/02/2017 1034        Ordering Location:   [address] [place] [address]   Received:      06/02/2017 [address] Endoscopy                               Pathologist:      Viktoria Elliott MD                                 Specimens:  A) - Duodenum, Duodenum bx-cold, dyspepsia                               B) - Stomach, Gastric body bx-cold, r/o H pylori                            C) - Esophagogastric junction, GE junction bx-cold, r/o barretts                    D) - Polyp, Colorectal, Transverse colon polyp-cold snare, polyp x2   LAB AP FINAL DIAGNOSIS (Report)     A  Duodenum (biopsy):  - Duodenal mucosa with no diagnostic abnormality  - No Marsh lesion  - Negative for dysplasia     B  Stomach, body (biopsy):  - Mild chronic inactive oxyntic gastritis  - No H pylori identified on immunohistochemistry stain  - No intestinal metaplasia (Alcian blue/PAS)    C  GE junction (biopsy):  - Cardiac-type mucosa with intestinal metaplasia   - Esophageal mucosa with mild reactive changes  - Negative for dysplasia     D  Transverse colon polyp x2 (polypectomy):  - Colonic mucosa with reactive lymphoid aggregate  - Negative for dysplasia (deeper sections examined)      Electronically signed by Viktoria Elliott MD on 6/12/2017 at 11:44 AM   LAB AP SURGICAL ADDITIONAL INFORMATION (Report)     These tests were developed and their performance characteristics   determined by [facility] or [address] [place] [address]  They may not be cleared or approved by the [place]  Food and   Drug Administration  The FDA has determined that such clearance or   approval is not necessary  These tests are used for clinical purposes  They should not be regarded as investigational or for research  This   laboratory has been approved by [place] [address], designated as a high-complexity   laboratory and is qualified to perform these tests  LAB AP GROSS DESCRIPTION (Report)     A  The specimen is received in formalin, labeled with the patient's name   and medical record number, and is designated duodenum biopsy-cold,   dyspepsia, are multiple irregularly shaped fragments of tan soft tissue   measuring 0 5 x 0 4 x 0 1 cm in aggregate  Entirely submitted  One   cassette  B  The specimen is received in formalin, labeled with the patient's name   and medical record number, and is designated gastric body biopsy-cold,   rule out H  pylori, are three irregularly shaped fragments of tan soft   tissue each measuring 0 3 cm in greatest dimension  Entirely submitted  One cassette  C  The specimen is received in formalin, labeled with the patient's name   and medical record number, and is designated GE junction biopsy-cold,   rule out Saucedo's, are two irregularly shaped fragments of tan soft   tissue measuring 0 3 and 0 2 cm in greatest dimension  Entirely submitted  One cassette  D  The specimen is received in formalin, labeled with the patient's name   and medical record number, and is designated transverse colon polyp-cold   snare, are two irregularly shaped fragments of tan soft tissue each   measuring 0 5 cm in greatest dimension  Entirely submitted  One cassette  Note: The estimated total formalin fixation time based upon information   provided by the submitting clinician and the standard processing schedule   is 15 75 hours        RLR   LAB AP CLINICAL INFORMATION (Report) Dyspepsia    EGD:  FINDINGS: Esophagus: The proximal third of the esophagus and middle   third of the esophagus appeared to be normal  A possible tongue of   Saucedo's esophagus was found in the GE junction, spanning 1 cm  Two   forceps biopsies was taken  The GE junction was observed 35 cm from the   entry site  Stomach: Non-erosive gastritis was found in the antrum  Otherwise, the stomach appeared to be normal  Two random biopsies was   taken from the body of the stomach  Duodenum: The duodenal bulb and 2nd   portion of the duodenum appeared to be normal  Four random biopsies was   taken     dyspepsia

## 2018-01-12 VITALS
HEIGHT: 63 IN | BODY MASS INDEX: 31.75 KG/M2 | HEART RATE: 74 BPM | RESPIRATION RATE: 16 BRPM | TEMPERATURE: 97.7 F | WEIGHT: 179.19 LBS | DIASTOLIC BLOOD PRESSURE: 72 MMHG | SYSTOLIC BLOOD PRESSURE: 118 MMHG

## 2018-01-12 VITALS
HEIGHT: 63 IN | BODY MASS INDEX: 31.36 KG/M2 | SYSTOLIC BLOOD PRESSURE: 110 MMHG | TEMPERATURE: 97.5 F | RESPIRATION RATE: 15 BRPM | DIASTOLIC BLOOD PRESSURE: 70 MMHG | WEIGHT: 177 LBS | HEART RATE: 70 BPM

## 2018-01-12 VITALS
DIASTOLIC BLOOD PRESSURE: 78 MMHG | HEIGHT: 63 IN | SYSTOLIC BLOOD PRESSURE: 134 MMHG | HEART RATE: 82 BPM | RESPIRATION RATE: 14 BRPM | TEMPERATURE: 100 F

## 2018-01-12 VITALS
OXYGEN SATURATION: 97 % | TEMPERATURE: 98.5 F | WEIGHT: 179.38 LBS | SYSTOLIC BLOOD PRESSURE: 106 MMHG | BODY MASS INDEX: 31.79 KG/M2 | HEIGHT: 63 IN | HEART RATE: 77 BPM | DIASTOLIC BLOOD PRESSURE: 74 MMHG

## 2018-01-12 NOTE — MISCELLANEOUS
Message  I spoke with patient regarding results of bloodwork  Her abdominal symptoms have improved significantly on Nexium  We will repeat CMP in one month  Referral to GI for colonoscopy and EGD  Patient understands and agrees  Plan  Continuous LUQ abdominal pain    · (1) COMPREHENSIVE METABOLIC PANEL; Status:Active; Requested KCA:[id number];     Signatures   Electronically signed by :  FLORENCIO Maldonado ; Jan 12 2017  7:01PM EST                       (Author)

## 2018-01-12 NOTE — MISCELLANEOUS
Message  [facility] faxed over a request for patient's latest MRI done by our provider  MRI from 7/2017 was faxed over  Active Problems    1  Acute serous otitis media, unspecified laterality   2  Allergic rhinitis (477 9) (J30 9)   3  Saucedo's esophagus without dysplasia (530 85) (K22 70)   4  Bilateral ovarian cysts (620 2) (N83 201,N83 202)   5  Diverticulosis (562 10) (K57 90)   6  Elevated ALT measurement (790 4) (R74 0)   7  Esophageal reflux (530 81) (K21 9)   8  Generalized anxiety disorder (300 02) (F41 1)   9  Hepatic steatosis (571 8) (K76 0)   10  Infection, upper respiratory (465 9) (J06 9)   11  Irritable bowel syndrome (IBS) (564 1) (K58 9)   12  Local superficial swelling, mass or lump (782 2) (R22 9)   13  Mass (782 2) (R22 9)   14  Neuroma (215 9) (D36 10)   15  Pain, upper back (724 5) (M54 9)   16  Screening for colorectal cancer (V76 51) (Z12 11,Z12 12)   17  Visit for screening mammogram (V76 12) (Z12 31)    Current Meds   1  Esomeprazole Magnesium 40 MG Oral Capsule Delayed Release; TAKE ONE   CAPSULE BY MOUTH EVERY DAY; Therapy: 99AZD4862 to (LMNLDIED:77BAW0795)  Requested for: 35LBN2554; Last   Rx:37Wtn2040 Ordered   2  ZyrTEC Allergy CAPS; take 1 capsule daily; Therapy: (Recorded:97Knl0813) to Recorded    Allergies    1   Sulfa Drugs    Signatures   Electronically signed by : Thelma Bagley, ; Oct  4 2017 10:10AM EST                       (Author)

## 2018-01-13 VITALS
HEIGHT: 63 IN | WEIGHT: 180.38 LBS | BODY MASS INDEX: 31.96 KG/M2 | HEART RATE: 75 BPM | DIASTOLIC BLOOD PRESSURE: 78 MMHG | TEMPERATURE: 98.9 F | SYSTOLIC BLOOD PRESSURE: 139 MMHG | RESPIRATION RATE: 18 BRPM

## 2018-01-13 VITALS
HEIGHT: 63 IN | WEIGHT: 177.38 LBS | BODY MASS INDEX: 31.43 KG/M2 | DIASTOLIC BLOOD PRESSURE: 78 MMHG | RESPIRATION RATE: 16 BRPM | TEMPERATURE: 98.4 F | SYSTOLIC BLOOD PRESSURE: 120 MMHG | HEART RATE: 84 BPM

## 2018-01-14 VITALS
SYSTOLIC BLOOD PRESSURE: 120 MMHG | HEIGHT: 63 IN | TEMPERATURE: 98.4 F | OXYGEN SATURATION: 98 % | DIASTOLIC BLOOD PRESSURE: 86 MMHG | WEIGHT: 185.5 LBS | HEART RATE: 80 BPM | BODY MASS INDEX: 32.87 KG/M2

## 2018-01-14 VITALS
TEMPERATURE: 98.2 F | WEIGHT: 180 LBS | BODY MASS INDEX: 31.89 KG/M2 | DIASTOLIC BLOOD PRESSURE: 76 MMHG | HEIGHT: 63 IN | HEART RATE: 88 BPM | SYSTOLIC BLOOD PRESSURE: 126 MMHG | RESPIRATION RATE: 16 BRPM

## 2018-01-14 VITALS
SYSTOLIC BLOOD PRESSURE: 125 MMHG | HEART RATE: 78 BPM | BODY MASS INDEX: 31.76 KG/M2 | DIASTOLIC BLOOD PRESSURE: 81 MMHG | HEIGHT: 63 IN | TEMPERATURE: 98.3 F | RESPIRATION RATE: 16 BRPM | WEIGHT: 179.25 LBS

## 2018-01-14 NOTE — MISCELLANEOUS
Message   Recorded as Task   Date: 08/31/2017 11:01 AM, Created By: Laura Hameed   Task Name: Follow Up   Assigned To: [facility],Team   Regarding Patient: Toro Bashir, Status: Active   Comment:    Nicolasa Rodgers - 31 Aug 2017 11:01 AM     TASK CREATED  Received call from patient questioning when she could return to work? S/P excision gluteal mass = 8/30/17 = PROCEDURE WAS ABORTED for the patients safety  Neurosurgery will be consulted post-operative  Patient states no F/V/N/C  She states that she hasn't yet had a BM - but all she had to eat yesterday was soup  She's aware of what measures to take to make sure that this does not become an issue  Patient would like to return to work on Tuesday, 9/5/17  (works in [address] [place] [address] Road, sitting at desk all day ) Patient states that she has a do-nut for sitting  Patient is aware that she cannot drive and/or work if she's taking pain medication  Patient will call me on Tuesday if she needs a note to be released back to work     OR if patient can only tolerate 1/2 day (until post-op appointment)  Confirmed post-op ppat for 9/11/17  Due to procedure being aborted, no pathology was submitted  Per e-mail received from Dr Ferdinand Baltazar - Dr Dilma Escobedo is already aware of patient  Entered order for neurosurgical consultation, sent task to team  Mailed patient order/referral to patients home address  1        1 Amended By: Laura Hameed; Sep 01 2017 9:22 AM EST    Active Problems   1  Acute serous otitis media, unspecified laterality  2  Allergic rhinitis (477 9) (J30 9)  3  Saucedo's esophagus without dysplasia (530 85) (K22 70)  4  Bilateral ovarian cysts (620 2) (N83 201,N83 202)  5  Elevated ALT measurement (790 4) (R74 0)  6  Esophageal reflux (530 81) (K21 9)  7  Generalized anxiety disorder (300 02) (F41 1)  8  Hepatic steatosis (571 8) (K76 0)  9  Infection, upper respiratory (465 9) (J06 9)  10  Irritable bowel syndrome (IBS) (564 1) (K58 9)  11   Local superficial swelling, mass or lump (782 2) (R22 9)  12  Mass (782 2) (R22 9)  13  Pain, upper back (724 5) (M54 9)  14  Screening for colorectal cancer (V76 51) (Z12 11,Z12 12)  15  Sebaceous cyst (706 2) (L72 3)  16  Visit for screening mammogram (V76 12) (Z12 31)    Current Meds  1  ALPRAZolam 0 5 MG Oral Tablet; take 1 tablet every 4 hours prn anxiety; Therapy: 69DGC5580 to (Evaluate:83Qeb3166); Last Rx:25Apr2017 Ordered  2  Esomeprazole Magnesium 40 MG Oral Capsule Delayed Release; TAKE ONE   CAPSULE BY MOUTH EVERY DAY; Therapy: 53VIP4113 to (Evaluate:04Pzl9691)  Requested for: 25MAD6556; Last   Rx:15May2017 Ordered  3  Nasonex 50 MCG/ACT Nasal Suspension (Mometasone Furoate); 2 SPRAYS INTO   EACH NOSTRIL DAILY; Therapy: 70FGA3088 to (Evaluate:47Qan6705)  Requested for: 54Vff7621; Last   Rx:60Bqc6567 Ordered  4  ZyrTEC Allergy CAPS; Therapy: (Recorded:29Jun2017) to Recorded    Allergies   1   Sulfa Drugs    Signatures   Electronically signed by : Stephan Viveros, ; Sep  1 2017  9:22AM EST                       (Author)

## 2018-01-14 NOTE — MISCELLANEOUS
Message   Recorded as Task   Date: 09/01/2017 09:19 AM, Created By: Tyra Hernández   Task Name: Document Appointment   Assigned To: Nicolasa Rodgers   Regarding Patient: Garrett Hargrove, Status: Active   CommentHelene Burgess - 01 Sep 2017 9:19 AM     TASK CREATED  Please contact patient to schedule consultation with Dr Amarjit Jackson  (Per Dr Sarah Macias - Dr Amarjit Jackson is already aware of this patient  Gibson Velasco )    Any questions, please don't hesitate to contact the office at [phone]  Josefina La - 01 Sep 2017 10:36 AM     TASK REASSIGNED: Previously Assigned To [facility]  PATIENT HAS APPOINTMENT SCHEDULED WITH DR Sary Murillo ON 09/21/17        Active Problems    1  Acute serous otitis media, unspecified laterality   2  Allergic rhinitis (477 9) (J30 9)   3  Saucedo's esophagus without dysplasia (530 85) (K22 70)   4  Bilateral ovarian cysts (620 2) (N83 201,N83 202)   5  Elevated ALT measurement (790 4) (R74 0)   6  Esophageal reflux (530 81) (K21 9)   7  Generalized anxiety disorder (300 02) (F41 1)   8  Hepatic steatosis (571 8) (K76 0)   9  Infection, upper respiratory (465 9) (J06 9)   10  Irritable bowel syndrome (IBS) (564 1) (K58 9)   11  Local superficial swelling, mass or lump (782 2) (R22 9)   12  Mass (782 2) (R22 9)   13  Neuroma (215 9) (D36 10)   14  Pain, upper back (724 5) (M54 9)   15  Screening for colorectal cancer (V76 51) (Z12 11,Z12 12)   16  Sebaceous cyst (706 2) (L72 3)   17  Visit for screening mammogram (V76 12) (Z12 31)    Current Meds   1  ALPRAZolam 0 5 MG Oral Tablet; take 1 tablet every 4 hours prn anxiety; Therapy: 87NQI4288 to (Evaluate:99Qzf0135); Last Rx:71Hww7130 Ordered   2  Esomeprazole Magnesium 40 MG Oral Capsule Delayed Release; TAKE ONE   CAPSULE BY MOUTH EVERY DAY; Therapy: 81ICT5033 to (Evaluate:88Fgk0793)  Requested for: 50DCA7364; Last   Rx:31Dqi0240 Ordered   3   Nasonex 50 MCG/ACT Nasal Suspension (Mometasone Furoate); 2 SPRAYS INTO   EACH NOSTRIL DAILY; Therapy: 73NSP6332 to (Evaluate:23Lba9242)  Requested for: 90Ehk3131; Last   Rx:73Dap1213 Ordered   4  ZyrTEC Allergy CAPS; Therapy: (Recorded:29Jun2017) to Recorded    Allergies    1   Sulfa Drugs    Signatures   Electronically signed by : Mary Lund, ; Sep  5 2017  3:17PM EST                       (Author)

## 2018-01-16 NOTE — MISCELLANEOUS
Message   Recorded as Task   Date: 11/16/2017 03:29 PM, Created By: Jimmie Rosa   Task Name: Care Coordination   Assigned To: Jimmie Rosa   Regarding Patient: Josephine Murphy, Status: Active   CommentWcaterina Rhodesluda - 16 Nov 2017 3:29 PM     TASK CREATED  LMOM for patient to call back to go over pre-op instructions  Jimmie Rosa - 62 Nov 2017 3:36 PM     TASK EDITED  Patient returned call  Went over pre-op instructions, including bathing and NPO status  She currently denies taking any blood thinning medications and she has no further questions at this time          Signatures   Electronically signed by : Starla Becerra RN; Nov 16 2017  3:36PM EST                       (Author) stretcher

## 2018-01-16 NOTE — CONSULTS
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 at [facility] by Dr Manisha Mcneil 11/17/17  All meds/allergies reviewed and updated w/ pt  History of Present Illness  Pre-Op Visit (Brief): The patient is being seen for a preoperative visit  Surgical Risk Assessment: Exercise Capacity: able to walk four blocks without symptoms and able to walk two flights of stairs without symptoms  Symptoms: no easy bleeding, no easy bruising, no frequent nosebleeds, no chest pain, no cough, no dyspnea, no edema, no palpitations and no wheezing  HPI: right buttock pain , radiating to the right leg, worse at night with lying flat, difficulty falling asleep due to pain  surgery 11/17/18   stress ECHO - Normal 3/2017   Daily medications include Nexium for acid reflux disease   on prednisone eye drops TID as per DR Tino Ramos   Patient is not up-to-date with gynecological exam and mammography  She is up-to-date with colonoscopy  Results of recent blood work discussed, mild elevation of ALT, otherwise normal  Patient did have right upper quadrant ultrasound in May of 2017 revealing fatty liver       Review of Systems    Constitutional: No fever, no chills, feels well, no tiredness, no recent weight gain or weight loss  Eyes: No complaints of eye pain, no red eyes, no eyesight problems, no discharge, no dry eyes, no itching of eyes  ENT: no complaints of earache, no loss of hearing, no nose bleeds, no nasal discharge, no sore throat, no hoarseness  Cardiovascular: No complaints of slow heart rate, no fast heart rate, no chest pain, no palpitations, no leg claudication, no lower extremity edema  Respiratory: No complaints of shortness of breath, no wheezing, no cough, no SOB on exertion, no orthopnea, no PND  Gastrointestinal: No complaints of abdominal pain, no constipation, no nausea or vomiting, no diarrhea, no bloody stools     Genitourinary: No complaints of dysuria, no incontinence, no pelvic pain, no dysmenorrhea, no vaginal discharge or bleeding  Musculoskeletal: as noted in HPI  Integumentary: No complaints of skin rash or lesions, no itching, no skin wounds, no breast pain or lump  Neurological: as noted in HPI  Psychiatric: Not suicidal, no sleep disturbance, no anxiety or depression, no change in personality, no emotional problems  Endocrine: No complaints of proptosis, no hot flashes, no muscle weakness, no deepening of the voice, no feelings of weakness  Hematologic/Lymphatic: No complaints of swollen glands, no swollen glands in the neck, does not bleed easily, does not bruise easily  Active Problems    1  Acute serous otitis media, unspecified laterality   2  Allergic rhinitis (477 9) (J30 9)   3  Saucedo's esophagus without dysplasia (530 85) (K22 70)   4  Bilateral ovarian cysts (620 2) (N83 201,N83 202)   5  Diverticulosis (562 10) (K57 90)   6  Elevated ALT measurement (790 4) (R74 0)   7  Esophageal reflux (530 81) (K21 9)   8  Generalized anxiety disorder (300 02) (F41 1)   9  Hepatic steatosis (571 8) (K76 0)   10  Hyperlipemia (272 4) (E78 5)   11  Infection, upper respiratory (465 9) (J06 9)   12  Irritable bowel syndrome (IBS) (564 1) (K58 9)   13  Local superficial swelling, mass or lump (782 2) (R22 9)   14  Mass (782 2) (R22 9)   15  Neuroma (215 9) (D36 10)   16  Pain, upper back (724 5) (M54 9)   17  Screening for colorectal cancer (V76 51) (Z12 11,Z12 12)   18  Visit for screening mammogram (V76 12) (Z12 31)    Past Medical History    · History of Continuous LUQ abdominal pain (789 02) (R10 12)   · History of abscess of skin and subcutaneous tissue (V13 3) (Z87 2)    The active problems and past medical history were reviewed and updated today  Surgical History    · History of Chemosurgery (Mohs Micrographic Technique)   · History of Complete Colonoscopy    The surgical history was reviewed and updated today         Family History    · Family history of colonic polyps (V18 51) (Z83 71)    · Family history of Colon cancer    The family history was reviewed and updated today  Social History    · Employed   · Admitt assist    · High school or GED   · Lives with spouse   ·    · Never a smoker   · No illicit drug use   · Social drinker (V49 89) (Z78 9)   · Two children  The social history was reviewed and updated today  Current Meds   1  Esomeprazole Magnesium 40 MG Oral Capsule Delayed Release; TAKE ONE CAPSULE   BY MOUTH EVERY DAY; Therapy: 87ERH1243 to (DTBUCLCQ:87JVB6552)  Requested for: 42JLL8223; Last   Rx:55Wsm2588 Ordered   2  Mometasone Furoate 50 MCG/ACT Nasal Suspension; 2 SPRAYS INTO EACH NOSTRIL   DAILY; Therapy: 99HND5364 to (Evaluate:13Apr2018)  Requested for: 90KKA0596; Last   Rx:27Oct2017 Ordered   3  ZyrTEC Allergy CAPS; take 1 capsule daily; Therapy: (Recorded:11Oct2017) to Recorded    The medication list was reviewed and updated today  Allergies    1  Sulfa Drugs    2  Seasonal    Vitals  Signs    Temperature: 98 2 F  Heart Rate: 88  Respiration: 16  Systolic: 146  Diastolic: 76  Height: 5 ft 3 in  Weight: 180 lb   BMI Calculated: 31 89  BSA Calculated: 1 85    Physical Exam    Constitutional   General appearance: No acute distress, well appearing and well nourished  Neck   Neck: Supple, symmetric, trachea midline, no masses  Thyroid: Normal, no thyromegaly  Pulmonary   Respiratory effort: No increased work of breathing or signs of respiratory distress  Auscultation of lungs: Clear to auscultation  Cardiovascular   Auscultation of heart: Normal rate and rhythm, normal S1 and S2, no murmurs  Carotid pulses: 2+ bilaterally  Abdominal aorta: Normal     Examination of extremities for edema and/or varicosities: Normal     Chest   Chest: Normal     Abdomen   Abdomen: Non-tender, no masses  Musculoskeletal   Gait and station: Normal   Palpable mass right buttock  Neurologic   Cranial nerves: Cranial nerves II-XII intact  Psychiatric   Judgment and insight: Normal     Recent and remote memory: Intact  Mood and affect: Normal        Results/Data  A 12 lead ECG was performed and was normal  No acute ischemia  Rhythm and rate: normal sinus rhythm  Assessment    1  Encounter for preventive health examination (V70 0) (Z00 00)   2  Neuroma (215 9) (D36 10)   3  Preoperative clearance (V72 84) (Z01 818)   4  Esophageal reflux (530 81) (K21 9)   5  Hepatic steatosis (571 8) (K76 0)    Plan   Neuroma    · Hydrocodone-Acetaminophen 5-325 MG Oral Tablet; 1 tablet  at at bedtime as  needed for pain    EKG/ECG- POC; Status:Resulted - Requires Verification,Retrospective Authorization;   Done: 91HIZ8228 12:00AM  Due:07Nov2018; Last Updated By:Kelly Blair; 11/7/2017 6:36:49 PM;Ordered; For:Preoperative clearance; Ordered By:Myron Zheng; Discussion/Summary  Surgical Clearance: She is at a LOW risk from a cardiovascular standpoint at this time without any additional cardiac testing  Reevaluation needed, if she should present with symptoms prior to surgery/procedure  Surgical clearance faxed to Dr Stan Arechiga   Patient presents for annual well exam/preop evaluation prior to surgery for removal of painful right buttock schwannoma  Procedure scheduled for November 17th  EKG performed in the office today is normal  Patient denies chest pain, palpitations, shortness of breath or dizziness  She had negative stress echo in March of 2017  Patient is acceptable risk for surgery  She has been experiencing significant pain at night due to pressure effect of right buttock mass, I provided her with prescription of Vicodin to be used at nighttime as needed only  I advised patient to avoid any anti-inflammatories 1 week prior to surgery  Patient is past due mammography and gyn exam, advised her to set up    Fatty liver-mild elevation of ALT, patient was evaluated by GI with EGD consistent with Saucedo's and right upper quadrant ultrasound consistent with fatty liver  She follows low-fat low-cholesterol diet and remains on Nexium  Patient is up-to-date with colonoscopy  Possible side effects of new medications were reviewed with the patient/guardian today  The treatment plan was reviewed with the patient/guardian  The patient/guardian understands and agrees with the treatment plan      End of Encounter Meds    1  Mometasone Furoate 50 MCG/ACT Nasal Suspension (Nasonex); 2 SPRAYS INTO   EACH NOSTRIL DAILY; Therapy: 03JCB2319 to (Evaluate:13Apr2018)  Requested for: 43MYG6590; Last   Rx:27Oct2017 Ordered   2  ZyrTEC Allergy CAPS; take 1 capsule daily; Therapy: (Recorded:11Oct2017) to Recorded    3  Esomeprazole Magnesium 40 MG Oral Capsule Delayed Release; TAKE ONE CAPSULE   BY MOUTH EVERY DAY; Therapy: 88RVE8004 to (WHRYCTWO:95UBA4131)  Requested for: 70TWR2799; Last   Rx:65Auo9750 Ordered    4  Hydrocodone-Acetaminophen 5-325 MG Oral Tablet; 1 tablet  at at bedtime as needed for   pain; Therapy: 79UNR9369 to (Last Rx:07Nov2017) Ordered    Signatures   Electronically signed by :  FLORENCIO Pierce ; Nov 8 2017 11:06AM EST                       (Author)

## 2018-01-22 ENCOUNTER — GENERIC CONVERSION - ENCOUNTER (OUTPATIENT)
Dept: OTHER | Facility: OTHER | Age: 53
End: 2018-01-22

## 2018-01-22 ENCOUNTER — HOSPITAL ENCOUNTER (OUTPATIENT)
Dept: MRI IMAGING | Facility: HOSPITAL | Age: 53
Discharge: HOME | End: 2018-01-22
Attending: NEUROLOGICAL SURGERY
Payer: COMMERCIAL

## 2018-01-22 VITALS — HEART RATE: 76 BPM | TEMPERATURE: 97.1 F | SYSTOLIC BLOOD PRESSURE: 122 MMHG | DIASTOLIC BLOOD PRESSURE: 74 MMHG

## 2018-01-22 DIAGNOSIS — D21.21 BENIGN TUMOR OF SOFT TISSUES OF LOWER LIMB, RIGHT: ICD-10-CM

## 2018-01-22 PROCEDURE — A9585 GADOBUTROL INJECTION: HCPCS | Performed by: NEUROLOGICAL SURGERY

## 2018-01-22 PROCEDURE — 72197 MRI PELVIS W/O & W/DYE: CPT

## 2018-01-22 RX ADMIN — GADOBUTROL 8 ML: 604.72 INJECTION INTRAVENOUS at 19:13

## 2018-01-23 VITALS
DIASTOLIC BLOOD PRESSURE: 76 MMHG | BODY MASS INDEX: 31.89 KG/M2 | TEMPERATURE: 97.3 F | SYSTOLIC BLOOD PRESSURE: 115 MMHG | HEART RATE: 78 BPM | RESPIRATION RATE: 16 BRPM | HEIGHT: 63 IN | WEIGHT: 180 LBS

## 2018-01-23 NOTE — MISCELLANEOUS
Message  Return to work or school:        Patient is under our professional care and was last seen in our office on 10/19/2017  Patient was advised surgical intervention which was performed on 11/17/17  Patient was advised not to work from the time of her procedure  Her FMLA / short-term disability should cover from 11/17/17 to 12/6/17  She was advised to return to work as tolerated on 12/6/17 but if at any point she does not feel able to perform full duty, she was advised to hold off on work until able to perform activities of daily living  Also, please excuse her from work on 12/7/17 after 2:15pm because she has a scheduled appointment in our office to go over important results regarding her surgery  For any questions or concerns please contact me at [phone]  Thank you  Sincerely,    [address]   Brittani Orozco MD, [address]       Signatures   Electronically signed by : Rodrigue Gibson, ; Dec  5 2017  6:42PM EST                       (Author)

## 2018-01-23 NOTE — MISCELLANEOUS
12/07/2017        Praveena Gutierrez was seen in our office today for an appointment , please include this in her FMLA   Arabella Medeiros MD FACS      Electronically signed by:Emigdio RUBIN    Dec  7 2017  4:11PM EST

## 2018-01-26 DIAGNOSIS — K21.9 CHRONIC GERD: Primary | ICD-10-CM

## 2018-01-26 RX ORDER — ESOMEPRAZOLE MAGNESIUM 40 MG/1
CAPSULE, DELAYED RELEASE ORAL
Qty: 30 CAPSULE | Refills: 3 | Status: SHIPPED | OUTPATIENT
Start: 2018-01-26 | End: 2018-01-28 | Stop reason: SDUPTHER

## 2018-01-28 DIAGNOSIS — K21.9 CHRONIC GERD: ICD-10-CM

## 2018-01-29 RX ORDER — ESOMEPRAZOLE MAGNESIUM 40 MG/1
CAPSULE, DELAYED RELEASE ORAL
Qty: 30 CAPSULE | Refills: 6 | Status: SHIPPED | OUTPATIENT
Start: 2018-01-29 | End: 2018-02-06

## 2018-02-02 NOTE — PROGRESS NOTES
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  -     MRI lumbosacral plexus wo and w contrast; Future            Discussion Summary: This is a 62-year-old female who underwent a resection of a large intramuscular myxoma  She is doing fairly well however there is still pain associated with movement of her leg  This is caused by scar formation between the nerve and the surrounding musculature  Physical therapy will be necessary to alleviate this discomfort  Continued follow-up was essential with this tumor which can return  Chief Complaint: Follow-up (Patient presents for 2 month follow up with MRI Hip s/p large nerve sheath tumor resection right buttocks by Dr July Pulliam on 11/17/17  Final Path Dx: Soft Tissue Myxoma)      Patient ID: Lulu Fabian is a 47 y o  female    Patient is a 47y old F referred by Dr Ailyn Xiao for sacral mass  This mass was identified in a MRI Pelvis measuring 7 2 x 6 8 x 3 6 cm  Patient was evaluated by Dr Ailyn Xiao and advised she was a candidate for excision of gluteal mass  When patient presented for surgery this was found to be in the sacral region and nothing was able to be done at the time  She was referred for neurosurgical evaluation  Patient underwent a resection of right buttock tumor  On pathology 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  This type of tumor as a low recurrence rate with complete resection which was achieved in this case  If she continues to have discomfort after 2 months time then an MRI of the region with a return visit would be indicated  Review of Systems   Constitutional: Negative  HENT: Negative  Eyes: Negative  Respiratory: Negative  Cardiovascular: Negative  Gastrointestinal: Negative  Endocrine: Negative  Genitourinary: Negative  Musculoskeletal: Positive for gait problem          Pain in the right buttock to the hip and occasional down the right leg to the knee  She has intolerance to driving or sleeping position  Skin: Negative  Allergic/Immunologic: Negative  Neurological: Negative for dizziness, tremors, seizures, syncope, facial asymmetry, speech difficulty, weakness, light-headedness, numbness and headaches  Hematological: Negative  Psychiatric/Behavioral: Negative  The following portions of the patient's history were reviewed and updated as appropriate: allergies, current medications, past family history, past medical history, past social history, past surgical history and problem list       Active Ambulatory Problems     Diagnosis Date Noted    Inflamed sebaceous cyst 08/30/2017    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     Resolved Ambulatory Problems     Diagnosis Date Noted    No Resolved Ambulatory Problems     Past Medical History:   Diagnosis Date    Abdominal pain     Anxiety     Back pain     Back pain     Saucedo's esophagus     Bilateral ovarian cysts     Cyst of buttocks     Diverticulosis     GERD (gastroesophageal reflux disease)     Hepatic steatosis     Hyperlipidemia     IBS (irritable bowel syndrome)     IBS (irritable bowel syndrome)     Liver disease     Malignant hyperthermia due to anesthesia     Neuroma     Pink eye disease of both eyes     Seasonal allergies     Squamous cell carcinoma        Past Surgical History:   Procedure Laterality Date    COLONOSCOPY      ESOPHAGOGASTRODUODENOSCOPY      LIPOMA RESECTION N/A 8/30/2017    Procedure: EXCISION GLUTEAL MASS;  Surgeon: Jackelyn Harris MD;  Location: [facility];  Service: General    MOHS SURGERY Left     neck    NERVE REPAIR Right 11/17/2017    Procedure: 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 (FROZEN SECTION); Surgeon: Dave Vital MD;  Location: [facility];  Service: Neurosurgery    MN COLONOSCOPY FLX DX W/[facility] WHEN PFRMD N/A 6/2/2017    Procedure: EGD AND COLONOSCOPY;  Surgeon: Batsheva Wolff MD;  Location: [facility];   Service: Gastroenterology         Vitals:    02/06/18 1556   BP: 117/77   Pulse: 75   Resp: 16   Temp: 98 6 °F (37 °C)         Objective:    Physical Exam   Awake and alert  power in her lower extremities is 5/5  Sensation is intact  She can step up onto a stool without difficulty  She can do lunges bilaterally    Results/Data:  MRI of the lumbosacral plexus demonstrates a complete resection of the mass  CT images below  There is some reactive contrast enhancement which likely represents scar in the region

## 2018-02-06 ENCOUNTER — OFFICE VISIT (OUTPATIENT)
Dept: NEUROSURGERY | Facility: CLINIC | Age: 53
End: 2018-02-06

## 2018-02-06 VITALS
DIASTOLIC BLOOD PRESSURE: 77 MMHG | WEIGHT: 186 LBS | SYSTOLIC BLOOD PRESSURE: 117 MMHG | TEMPERATURE: 98.6 F | BODY MASS INDEX: 32.96 KG/M2 | HEART RATE: 75 BPM | HEIGHT: 63 IN | RESPIRATION RATE: 16 BRPM

## 2018-02-06 DIAGNOSIS — D21.9 MYXOMA DETERMINED BY BIOPSY OF MUSCLE: Primary | ICD-10-CM

## 2018-02-06 PROCEDURE — 99024 POSTOP FOLLOW-UP VISIT: CPT | Performed by: NEUROLOGICAL SURGERY

## 2018-02-06 NOTE — LETTER
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  -     MRI lumbosacral plexus wo and w contrast; Future            Discussion Summary: This is a 62-year-old female who underwent a resection of a large intramuscular myxoma  She is doing fairly well however there is still pain associated with movement of her leg  This is caused by scar formation between the nerve and the surrounding musculature  Physical therapy will be necessary to alleviate this discomfort  Continued follow-up was essential with this tumor which can return  Chief Complaint: Follow-up (Patient presents for 2 month follow up with MRI Hip s/p large nerve sheath tumor resection right buttocks by Dr Huy Robles on 11/17/17  Final Path Dx: Soft Tissue Myxoma)      Patient ID: Ai Ibrahim is a 47 y o  female    Patient is a 47y old F referred by Dr Giselle Encinas for sacral mass  This mass was identified in a MRI Pelvis measuring 7 2 x 6 8 x 3 6 cm  Patient was evaluated by Dr Giselle Encinas and advised she was a candidate for excision of gluteal mass  When patient presented for surgery this was found to be in the sacral region and nothing was able to be done at the time  She was referred for neurosurgical evaluation  Patient underwent a resection of right buttock tumor  On pathology 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   This type of tumor as a low recurrence rate with complete resection which was achieved in this case  If she continues to have discomfort after 2 months time then an MRI of the region with a return visit would be indicated  Review of Systems   Constitutional: Negative  HENT: Negative  Eyes: Negative  Respiratory: Negative  Cardiovascular: Negative  Gastrointestinal: Negative  Endocrine: Negative  Genitourinary: Negative  Musculoskeletal: Positive for gait problem  Pain in the right buttock to the hip and occasional down the right leg to the knee  She has intolerance to driving or sleeping position  Skin: Negative  Allergic/Immunologic: Negative  Neurological: Negative for dizziness, tremors, seizures, syncope, facial asymmetry, speech difficulty, weakness, light-headedness, numbness and headaches  Hematological: Negative  Psychiatric/Behavioral: Negative            The following portions of the patient's history were reviewed and updated as appropriate: allergies, current medications, past family history, past medical history, past social history, past surgical history and problem list       Active Ambulatory Problems     Diagnosis Date Noted    Inflamed sebaceous cyst 08/30/2017    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     Resolved Ambulatory Problems     Diagnosis Date Noted    No Resolved Ambulatory Problems     Past Medical History:   Diagnosis Date    Abdominal pain     Anxiety     Back pain     Back pain     Saucedo's esophagus     Bilateral ovarian cysts     Cyst of buttocks     Diverticulosis     GERD (gastroesophageal reflux disease)     Hepatic steatosis     Hyperlipidemia     IBS (irritable bowel syndrome)     IBS (irritable bowel syndrome)     Liver disease     Malignant hyperthermia due to anesthesia     Neuroma     Pink eye disease of both eyes     Seasonal allergies     Squamous cell carcinoma        Past Surgical History:   Procedure Laterality Date    COLONOSCOPY      ESOPHAGOGASTRODUODENOSCOPY      LIPOMA RESECTION N/A 8/30/2017    Procedure: EXCISION GLUTEAL MASS;  Surgeon: Gladys Simon MD;  Location: [facility];  Service: General    MOHS SURGERY Left     neck    NERVE REPAIR Right 11/17/2017    Procedure: 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 (FROZEN SECTION); Surgeon: Rachel Blanco MD;  Location: [facility];  Service: Neurosurgery    [place] COLONOSCOPY FLX DX W/COLLJ [address] WHEN PFRMD N/A 6/2/2017    Procedure: EGD AND COLONOSCOPY;  Surgeon: Kim Sherman MD;  Location: [facility]; Service: Gastroenterology         Vitals:    02/06/18 1556   BP: 117/77   Pulse: 75   Resp: 16   Temp: 98 6 °F (37 °C)         Objective:    Physical Exam   Awake and alert  power in her lower extremities is 5/5  Sensation is intact  She can step up onto a stool without difficulty  She can do lunges bilaterally    Results/Data:  MRI of the lumbosacral plexus demonstrates a complete resection of the mass  CT images below  There is some reactive contrast enhancement which likely represents scar in the region

## 2018-02-06 NOTE — PATIENT INSTRUCTIONS
Participate in physical therapy followed by stretching exercises    Take a B complex vitamin    If in physical therapy the pain becomes severe then call and we can begin gabapentin    Report any changes in your neurologic function

## 2018-02-07 ENCOUNTER — TRANSCRIBE ORDERS (OUTPATIENT)
Dept: NEUROSURGERY | Facility: CLINIC | Age: 53
End: 2018-02-07

## 2018-02-07 DIAGNOSIS — D36.10: Primary | ICD-10-CM

## 2018-02-14 ENCOUNTER — EVALUATION (OUTPATIENT)
Dept: PHYSICAL THERAPY | Facility: CLINIC | Age: 53
End: 2018-02-14
Payer: COMMERCIAL

## 2018-02-14 DIAGNOSIS — D36.10: ICD-10-CM

## 2018-02-14 PROCEDURE — G8990 OTHER PT/OT CURRENT STATUS: HCPCS | Performed by: PHYSICAL THERAPIST

## 2018-02-14 PROCEDURE — 97162 PT EVAL MOD COMPLEX 30 MIN: CPT | Performed by: PHYSICAL THERAPIST

## 2018-02-14 PROCEDURE — G8991 OTHER PT/OT GOAL STATUS: HCPCS | Performed by: PHYSICAL THERAPIST

## 2018-02-14 NOTE — PROGRESS NOTES
PT Evaluation     Today's date: 2018  Patient name: Ai Ibrahim  : 1965  MRN: [id number]  Referring provider: Susan Gifford MD  Dx:   Encounter Diagnosis   Name Primary?  Nerve sheath myxoma        Start Time:   Stop Time:   Total time in clinic (min): 50 minutes    Assessment/Plan     This patient presents w/ R buttock and RLE pain of  Almost one year duration, LBP of 3 years duration  She is now 2 months s/p excision of benign tumor L buttock which was impinging sciatic nerve  She exhibits pain/ tenderness R buttock muscles and pain w/ R UPA's L5  This patient is a good candidate for skilled physical therapy to reduce pain and improve function  STGs: 3-4 weeks  1  Increase ROM R hip IR to at least 30 degrees  2  Decrease pain R buttock and RLE 2-3 levels  3  Restore pain-free trunk AROM  LTGs: 6-8 weeks  1  Independent HEP  2  Return to usual activity level without pain > 2/10  3  Increase FOTO score to 65    Frequency and duration: 2 x/week for 6-8 weeks      Subjective   This is a 47 y o  female who reports onset of LBP about 3 years ago; in  she had a tumor in the R buttock; initially underwent surgery for excision of tumor in August but surgery was not completed due to the tumor was on the sciatic nerve; she was referred to neurosurgeon and underwent excision of neuroma on sciatic nerve on 17  Tumor was benign   Continues to have pain down the RLE, pain awakens her at night  Current complaint: pain lower sacral region, across the R buttock and down the lateral aspect of the RLE to the knee; also continues to have central lumbar pain  Aggravating factors: certain movements; certain sleep positions, lying supine, driving, sitting up straight, walking  Relieving factors: heating pad, Advil, Tylenol; feels better the less she moves  Previous treatment: chiropractic for back pain - did not help  Dx tests: MRI R hip  Occupation:  - sits all day  Leisure: walking, yoga  Patients goal: to not have pain    Objective     Pain scale: 0-9/10 R buttock/ lower leg    Function:  Unable to sleep through the night - awakens w/ pain  Avoids any lifting    Posture:  Trunk ROM     Flexion AROM    Min limitation *     Extension  AROM    Min limitation *     R lateral flexion AROM    Min limitation *     L lateral flexion AROM     WNL     R rotation AROM   WNL     L rotation AROM   WNL  * comparable sign        Special tests:    PSLR (-)      Neural tension tests:  Slump: (-)    Palpation:  Pain sacral region w/ R UPA L5; tender R gluteal region    Reflexes:  Patella 2+ B  Achilles 2+ B    Sensation:  Vibration WNL    Myotomal testing    L2 - Psoas =    L3 - Quadriceps = 5/5    L4/5 - Ant   Tibialis = 5/5    L5 - Glut med = 5/5 (pain w/ resisted R hip abd)    L5 - EHL = 5/5    S1-2 -  Glut brenda = 5/5 (pain w/ resisted R hip extension)       ROM limited  and painful R hip IR 22 degrees; otherwise WFL to WNL BLE    Left lower extremity ROM / strength WFL        Precautions: none    Daily Treatment Diary     Manual  2/14            TrP release L gluteal mm/piriformis             R UPA's lumbar                                                        Exercise Diary  2/14            Gluteal stretch             Piriformis stretch             Sciatic nerve slider             DLS: abdominal bracing             Quadruped alt UE / LE lifts             Mini squats             clamshells             SL abd             Bridges             Prone extension hip             QL stretch                                                                                                                                      Modalities                                                               Flowsheet Rows    Flowsheet Row Most Recent Value   PT/OT G-Codes   Current Score  56   Projected Score  65   FOTO information reviewed  Yes   Assessment Type  Evaluation   G code set  Other PT/OT Primary   Other PT Primary Current Status ()  CJ   Other PT Primary Goal Status ()  CJ

## 2018-02-19 ENCOUNTER — OFFICE VISIT (OUTPATIENT)
Dept: PHYSICAL THERAPY | Facility: CLINIC | Age: 53
End: 2018-02-19
Payer: COMMERCIAL

## 2018-02-19 DIAGNOSIS — D36.10: Primary | ICD-10-CM

## 2018-02-19 PROCEDURE — 97140 MANUAL THERAPY 1/> REGIONS: CPT

## 2018-02-19 PROCEDURE — 97010 HOT OR COLD PACKS THERAPY: CPT

## 2018-02-19 PROCEDURE — 97110 THERAPEUTIC EXERCISES: CPT

## 2018-02-19 NOTE — PROGRESS NOTES
Daily Note     Today's date: 2018  Patient name: Jena Stratton  : 1965  MRN: [id number]  Referring provider: Caty Scott MD  Dx:   Encounter Diagnosis     ICD-10-CM    1  Nerve sheath myxoma D36 10                   Subjective: Reports just from sitting at work and not a lot of movement she does not have a lot of pain  States when she moves she gets pain into the R LE to the knee  Objective: See treatment diary below  Pt instructed in TE's per flow sheet  Reviewed possible sleeping positions to try  Assessment: Tolerated treatment well  Pain levels monitored t/o session  Pt demonstrates weakness on the R   Pain noted w/ PA's on L5  Plan: Continue per plan of care  Progress treatment as tolerated      Precautions: none    Daily Treatment Diary     Manual             TrP release L gluteal mm/piriformis  JK           R UPA's lumbar  JR                                                      Exercise Diary             Gluteal stretch  3x30"           Piriformis stretch  3x30"           Sciatic nerve slider  10           DLS: abdominal bracing  10x10"           Quadruped alt UE / LE lifts  10           Mini squats  10x5"           clamshells  10x5"           SL abd  10           Bridges             Prone extension hip             QL stretch  Sit  3x30"                                                                                                                                    Modalities                CP post sl

## 2018-02-21 ENCOUNTER — OFFICE VISIT (OUTPATIENT)
Dept: PHYSICAL THERAPY | Facility: CLINIC | Age: 53
End: 2018-02-21
Payer: COMMERCIAL

## 2018-02-21 DIAGNOSIS — D36.10: Primary | ICD-10-CM

## 2018-02-21 PROCEDURE — 97140 MANUAL THERAPY 1/> REGIONS: CPT | Performed by: PHYSICAL THERAPIST

## 2018-02-21 PROCEDURE — 97110 THERAPEUTIC EXERCISES: CPT | Performed by: PHYSICAL THERAPIST

## 2018-02-21 NOTE — PROGRESS NOTES
Daily Note     Today's date: 2018  Patient name: Lu Salcedo  : 1965  MRN: [id number]  Referring provider: Clarice Adnres MD  Dx:   No diagnosis found  Subjective: pain R buttock and lateral RLE to knee - aggravated by raising R knee toward chest in standing      Objective: See treatment diary below  Precautions: none    Daily Treatment Diary     Manual            TrP release L gluteal mm/piriformis  JK np          R UPA's lumbar  JR performed          Prone PA's w/ sciatic nerve glide             K-tape scar                              Exercise Diary            Gluteal stretch  3x30"           Piriformis stretch  3x30" 3x30"          Sciatic nerve slider  10 15x          DLS: abdominal bracing  10x10" w/ ex          Quadruped alt UE / LE lifts  10 10x cues          Mini squats  10x5" 10x5"          clamshells  10x5" 15x5"ea          SL abd  10 np          Bridges   10x5"          Prone extension hip             QL stretch  Sit  3x30" np          Supine marches   nv                                                                                                                      Modalities                CP post sl                                         Assessment: Tolerated treatment well  Is challenged w/ stability in quadruped exercise - improved w/ cueing    Plan: Continue per plan of care  Progress treatment as tolerated

## 2018-02-26 ENCOUNTER — OFFICE VISIT (OUTPATIENT)
Dept: PHYSICAL THERAPY | Facility: CLINIC | Age: 53
End: 2018-02-26
Payer: COMMERCIAL

## 2018-02-26 DIAGNOSIS — D36.10: Primary | ICD-10-CM

## 2018-02-26 PROCEDURE — 97110 THERAPEUTIC EXERCISES: CPT | Performed by: PHYSICAL THERAPIST

## 2018-02-26 PROCEDURE — 97140 MANUAL THERAPY 1/> REGIONS: CPT | Performed by: PHYSICAL THERAPIST

## 2018-02-26 NOTE — PROGRESS NOTES
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                   Subjective: states she did a lot on Saturday - was on feet all day and the next day was rough; pain R buttock and lateral RLE to knee    Objective: See treatment diary below  Assessment: reported pain relief w/ R leg pull and w/ MET - resisted L hip ext and R hip flexion but no change in symptoms after manuals; continues to have pain w/ R hip flexion; no palpable TrP's in R QL or gluteal mm; pain increased w/ contraction of gluteal mm  Plan: Progress treatment as tolerated        Precautions: none    Daily Treatment Diary     Manual           TrP release L gluteal mm/piriformis  JK np No TrP's noted         R UPA's lumbar  PA's lumbar; LUPA L 5  JR performed performed         MET innom rot and sacral extension    Resist R flex/ L ext         Lumbar rotation mob in SL             Leg pull R    performed             Exercise Diary           Gluteal stretch  3x30"  np         Piriformis stretch  3x30" 3x30" np         Sciatic nerve slider  10 15x 15x         DLS: abdominal bracing  10x10" w/ ex 10x10"pressure cuff         Quadruped alt UE / LE lifts  10 10x cues np         Mini squats  10x5" 10x5" np         clamshells  10x5" 15x5"ea attempted         SL abd  10 np np         Bridges   10x5" np                      QL stretch  Sit  3x30" np np         Supine marches   nv attemtped         Abdominal bracing w/ pressure cuff    10x10"                                                                                                        Modalities              CP post sl

## 2018-02-28 ENCOUNTER — OFFICE VISIT (OUTPATIENT)
Dept: PHYSICAL THERAPY | Facility: CLINIC | Age: 53
End: 2018-02-28
Payer: COMMERCIAL

## 2018-02-28 DIAGNOSIS — D36.10: Primary | ICD-10-CM

## 2018-02-28 PROCEDURE — 97110 THERAPEUTIC EXERCISES: CPT | Performed by: PHYSICAL THERAPIST

## 2018-02-28 NOTE — PROGRESS NOTES
Daily Note     Today's date: 2018  Patient name: Luis Brownlee  : 1965  MRN: [id number]  Referring provider: Jong Loyd MD  Dx:   Encounter Diagnosis     ICD-10-CM    1  Nerve sheath myxoma D36 10        Start Time:   Stop Time:   Total time in clinic (min): 47 minutes    Subjective: continues to report pain w/ hip flexion    Objective: See treatment diary below  Assessment: tight B hip flexors, (+) Santino test; difficulty maintaining neutral lumbar spine w/ exercises and functional activities - tends to exhibit increased lordosis; needs continued work on lumbar stability; was able to perform supine marches w/out provoking pain when stabilizing back; held manuals this visit; reported feeling good at end of session    Plan: Progress treatment as tolerated   Emphasis on strengthening/ stabilization and hip flexor stretching    Precautions: none    Daily Treatment Diary     Manual          TrP release L gluteal mm/piriformis  JK np No TrP's noted np        R UPA's lumbar  PA's lumbar; LUPA L 5  JR performed performed np        MET innom rot and sacral extension    Resist R flex/ L ext np        Lumbar rotation mob in SL             Leg pull R    performed np            Exercise Diary          Gluteal stretch  3x30"  np         Piriformis stretch  3x30" 3x30" np         Sciatic nerve slider  10 15x 15x np        DLS: abdominal bracing  10x10" w/ ex 10x10"pressure cuff 10x 10"  W/ cuff        Quadruped alt UE / LE lifts  10 10x cues np 10x        Mini squats  10x5" 10x5" np         clamshells  10x5" 15x5"ea attempted 10x        SL abd  10 np np --- --- -- --     Bridges   10x5" np 10x5"                     QL stretch  Sit  3x30" np np np        Supine marches   nv attemtped W/ cuff  10xea        Abdominal bracing w/ pressure cuff    10x10" 10x10"        Hip flexor stretch - santino     1' ea Posture awareness     See below        2-28: discussed sitting position at work - states she leans foward toward her computer; advised to pay attention to neutral lumbar position, avoiding excessive lordosis    Modalities   2/19 2/21 2/26           CP post sl

## 2018-03-05 ENCOUNTER — OFFICE VISIT (OUTPATIENT)
Dept: GASTROENTEROLOGY | Facility: CLINIC | Age: 53
End: 2018-03-05
Payer: COMMERCIAL

## 2018-03-05 ENCOUNTER — APPOINTMENT (OUTPATIENT)
Dept: PHYSICAL THERAPY | Facility: CLINIC | Age: 53
End: 2018-03-05
Payer: COMMERCIAL

## 2018-03-05 VITALS
SYSTOLIC BLOOD PRESSURE: 138 MMHG | HEART RATE: 82 BPM | BODY MASS INDEX: 33.24 KG/M2 | TEMPERATURE: 98.9 F | HEIGHT: 63 IN | DIASTOLIC BLOOD PRESSURE: 84 MMHG | WEIGHT: 187.6 LBS

## 2018-03-05 DIAGNOSIS — Z12.11 SCREENING FOR COLON CANCER: ICD-10-CM

## 2018-03-05 DIAGNOSIS — K22.70 BARRETT'S ESOPHAGUS WITHOUT DYSPLASIA: ICD-10-CM

## 2018-03-05 DIAGNOSIS — K76.0 HEPATIC STEATOSIS: Primary | ICD-10-CM

## 2018-03-05 PROCEDURE — 99213 OFFICE O/P EST LOW 20 MIN: CPT | Performed by: PHYSICIAN ASSISTANT

## 2018-03-05 NOTE — PATIENT INSTRUCTIONS
Gastroesophageal Reflux Disease   AMBULATORY CARE:   Gastroesophageal reflux  reflux occurs when acid and food in the stomach back up into the esophagus  Gastroesophageal reflux disease (GERD) is reflux that occurs more than twice a week for a few weeks  It usually causes heartburn and other symptoms  GERD can cause other health problems over time if it is not treated  Common symptoms include:  Heartburn is the most common symptom of GERD  You may feel burning pain in your chest or below the breast bone  This usually occurs after meals and spreads to your neck, jaw, or shoulder  The pain gets better when you change positions  You may also have any of the following:  · Bitter or acid taste in your mouth    · Dry cough    · Trouble swallowing or pain with swallowing    · Hoarseness or sore throat    · Frequent burping or hiccups    · Feeling of fullness soon after you start eating  Seek care immediately if:  · You feel full and cannot burp or vomit  · You have severe chest pain and sudden trouble breathing  · Your bowel movements are black, bloody, or tarry-looking  · Your vomit looks like coffee grounds or has blood in it  Contact your healthcare provider if:   · You vomit large amounts, or you vomit often  · You have trouble breathing after you vomit  · You have trouble swallowing, or pain with swallowing  · You are losing weight without trying  · Your symptoms get worse or do not improve with treatment  · You have questions or concerns about your condition or care  Treatment for GERD:  Your healthcare provider may prescribe medicine to decrease stomach acid  He may also prescribe medicine that help your esophagus and stomach move food and liquid to your intestines  Surgery may be done if other treatments do not work  You may need surgery to wrap the upper part of the stomach around the esophageal sphincter  This will strengthen the sphincter and prevent reflux     Manage GERD: · Do not have foods or drinks that may increase heartburn  These include chocolate, peppermint, fried or fatty foods, drinks that contain caffeine, or carbonated drinks (soda)  Other foods include spicy foods, onions, tomatoes, and tomato-based foods  Do not have foods or drinks that can irritate your esophagus, such as citrus fruits, juices, and alcohol  · Do not eat large meals  When you eat a lot of food at one time, your stomach needs more acid to digest it  Eat 6 small meals each day instead of 3 large ones, and eat slowly  Do not eat meals 2 to 3 hours before bedtime  · Elevate the head of your bed  Place 6-inch blocks under the head of your bed frame  You may also use more than one pillow under your head and shoulders while you sleep  · Maintain a healthy weight  If you are overweight, weight loss may help relieve symptoms of GERD  · Do not smoke  Smoking weakens the lower esophageal sphincter and increases the risk of GERD  Ask your healthcare provider for information if you currently smoke and need help to quit  E-cigarettes or smokeless tobacco still contain nicotine  Talk to your healthcare provider before you use these products  · Do not wear clothing that is tight around your waist   Tight clothing can put pressure on your stomach and cause or worsen GERD symptoms  Follow up with your healthcare provider as directed:  Write down your questions so you remember to ask them during your visits  © 2017 [employer] Information is for End User's use only and may not be sold, redistributed or otherwise used for commercial purposes  All illustrations and images included in CareNotes® are the copyrighted property of [employer]  or [employer]  The above information is an  only  It is not intended as medical advice for individual conditions or treatments   Talk to your doctor, nurse or pharmacist before following any medical regimen to see if it is safe and effective for you

## 2018-03-05 NOTE — PROGRESS NOTES
[facility] - Outpatient Follow-up Note  Marzena Montilla 47 y o  female MRN: [id number]  Encounter: [id number]          ASSESSMENT AND PLAN:      1  Hepatic steatosis  She is stable transaminitis with mild elevation of ALT  Recommend monitoring of LFTs every 6 months  Discussed the importance of diet and exercise  - Hepatic function panel; Future    2  Saucedo's esophagus without dysplasia  Identified on EGD last year  Continue Nexium 40 mg daily  Plan for repeat EGD June 2019 for surveillance of Saucedo's esophagus  3  Screening for colon cancer  Colonoscopy last year revealed 2 small benign polyps  Recommend repeat colonoscopy in 5 years due to history of colon polyps and family history of colon cancer     ______________________________________________________________________    SUBJECTIVE: 58-year-old female with GERD and IBS presenting for office follow-up  She takes Nexium 40 mg daily for history of GERD and Saucedo's esophagus  She does have occasional heartburn after eating reflux trigger foods  She denies nausea and vomiting  Her bowel movements are regular and formed  No rectal bleeding or unintentional weight loss  She has history of hepatomegaly and hepatic steatosis found on RUQ ultrasound  Last blood work from October 2017 showed stable mild ALT elevation  She recently had surgery and is undergoing physical therapy  She states she will start more intensive exercise after she recovers from her surgery  Last EGD/colonoscopy was June 2017  EGD showed short segment Saucedo's esophagus and non-erosive gastritis  Biopsies of the GE junction confirmed BE without dysplasia  Gastric biopsies negative for h  pylori and duodenal biopsies unremarkable  Colonoscopy revealed two small benign polyps, diverticulosis, and lipoma  REVIEW OF SYSTEMS IS OTHERWISE NEGATIVE        Historical Information   Past Medical History:   Diagnosis Date    Abdominal pain     Anxiety     Back pain     Back pain     Saucedo's esophagus     Bilateral ovarian cysts     Cyst of buttocks     Diverticulosis     GERD (gastroesophageal reflux disease)     Hepatic steatosis     Hyperlipidemia     IBS (irritable bowel syndrome)     IBS (irritable bowel syndrome)     Liver disease     fatty liver    Malignant hyperthermia due to anesthesia     Neuroma     Pink eye disease of both eyes     Chronic and was recently treated    Seasonal allergies     Squamous cell carcinoma     Neck     Past Surgical History:   Procedure Laterality Date    COLONOSCOPY      ESOPHAGOGASTRODUODENOSCOPY      LIPOMA RESECTION N/A 8/30/2017    Procedure: EXCISION GLUTEAL MASS;  Surgeon: Ira Mcnally MD;  Location: [facility];  Service: General    MOHS SURGERY Left     neck    NERVE REPAIR Right 11/17/2017    Procedure: 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 (FROZEN SECTION); Surgeon: Tony Mendez MD;  Location: [facility];  Service: Neurosurgery    VT COLONOSCOPY FLX DX W/[facility] WHEN PFRMD N/A 6/2/2017    Procedure: EGD AND COLONOSCOPY;  Surgeon: Priyank Henry MD;  Location: [facility];   Service: Gastroenterology     Social History   History   Alcohol Use    Yes     Comment: 5 monthly     History   Drug Use No     History   Smoking Status    Never Smoker   Smokeless Tobacco    Never Used     Family History   Problem Relation Age of Onset    Cancer Father     Colon cancer Father     Colon polyps Mother        Meds/Allergies       Current Outpatient Prescriptions:     acetaminophen (TYLENOL) 500 mg tablet    ALPRAZolam (XANAX) 0 5 mg tablet    cetirizine (ZyrTEC) 10 mg tablet    ibuprofen (MOTRIN) 200 mg tablet    mometasone (NASONEX) 50 mcg/act nasal spray    omeprazole (PriLOSEC) 40 MG capsule    Allergies   Allergen Reactions    Pollen Extract     Sulfa Antibiotics Rash           Objective     Blood pressure 138/84, pulse 82, temperature 98 9 °F (37 2 °C), temperature source Tympanic, height 5' 3" (1 6 m), weight 85 1 kg (187 lb 9 6 oz)  PHYSICAL EXAM:      General Appearance:   Alert, cooperative, no distress   HEENT:   Normocephalic, atraumatic, anicteric      Neck:  Supple, symmetrical, trachea midline   Lungs:   Clear to auscultation bilaterally; no rales, rhonchi or wheezing; respirations unlabored    Heart[de-identified]   Regular rate and rhythm; no murmur, rub, or gallop  Abdomen:   Soft, non-tender, non-distended; normal bowel sounds; no masses, no organomegaly    Genitalia:   Deferred    Rectal:   Deferred    Extremities:  No cyanosis, clubbing or edema    Pulses:  2+ and symmetric    Skin:  No jaundice, rashes, or lesions    Lymph nodes:  No palpable cervical lymphadenopathy        Lab Results:   No visits with results within 1 Day(s) from this visit  Latest known visit with results is:   Admission on 11/17/2017, Discharged on 11/17/2017   Component Date Value    ABO Grouping 11/17/2017 A     Rh Factor 11/17/2017 Negative     Antibody Screen 11/17/2017 Negative     Specimen Expiration Date 11/17/2017 [id number]     Case Report 11/17/2017                      Value:Surgical Pathology Report                         Case: [id number]                                   Authorizing Provider:  Brenda Mayorga MD        Collected:           11/17/2017 0916              Ordering Location:     [address] [place] [address]      Received:            11/17/2017 [address] [place] [address] Operating Room                                                      Pathologist:           Vickie Elizabeth MD                                                                Specimens:   A) - Mass, Right sacral nerve mass                                                                  B) - Mass, Right sacral nerve mass                                                         Final Diagnosis 11/17/2017                      Value: This result contains rich text formatting which cannot be displayed here     Note 11/17/2017                      Value: This result contains rich text formatting which cannot be displayed here   Additional Information 11/17/2017                      Value: This result contains rich text formatting which cannot be displayed here   Intraoperative Consultat* 11/17/2017                      Value: This result contains rich text formatting which cannot be displayed here  [employer] Description 11/17/2017                      Value: This result contains rich text formatting which cannot be displayed here   Clinical Information 11/17/2017                      Value: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, unspecified    POC Glucose 11/17/2017 125          Radiology Results:   Ultrasound reviewed 2017

## 2018-03-05 NOTE — LETTER
March 5, 2018     [address] [address]    Patient: Alli Hamilton   YOB: 1965   Date of Visit: 3/5/2018       Dear Dr Katina Sheriff: Thank you for referring Fani Johnathonroxycris to me for evaluation  Below are my notes for this consultation  If you have questions, please do not hesitate to call me  I look forward to following your patient along with you           Sincerely,        Juan Ca PA-C        CC: No Recipients

## 2018-03-06 ENCOUNTER — OFFICE VISIT (OUTPATIENT)
Dept: PHYSICAL THERAPY | Facility: CLINIC | Age: 53
End: 2018-03-06
Payer: COMMERCIAL

## 2018-03-06 DIAGNOSIS — D36.10: Primary | ICD-10-CM

## 2018-03-06 PROCEDURE — 97110 THERAPEUTIC EXERCISES: CPT | Performed by: PHYSICAL THERAPIST

## 2018-03-06 NOTE — PROGRESS NOTES
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                   Subjective: reports that her back is popping a lot today; still has pain w/ R hip flexion    Objective: See treatment diary below  Assessment: improved ability to stabilize lumbar spine w/ quadruped exercise; pain w/ clamshells but pain was abolished when patient was able to contract abdominals and maintain neutral spine position    Plan: Progress treatment as tolerated   Emphasis on strengthening/ stabilization and hip flexor stretching    Precautions: none    Daily Treatment Diary     Manual   3/6       TrP release L gluteal mm/piriformis  JK np No TrP's noted np np       R UPA's lumbar  PA's lumbar; LUPA L 5  JR performed performed np np       MET innom rot and sacral extension    Resist R flex/ L ext np np                    Leg pull R    performed np np           Exercise Diary   3/6       Gluteal stretch  3x30"  np np np       Piriformis stretch  3x30" 3x30" np np np       Sciatic nerve slider  10 15x 15x np np       DLS: abdominal bracing  10x10" w/ ex 10x10"pressure cuff 10x 10"  W/ cuff 10x 10"       Quadruped alt UE / LE lifts  10 10x cues np 10x 10x2       Mini squats  10x5" 10x5" np  10# kb       clamshells  10x5" 15x5"ea attempted 10x 10x 10"       SL abd  10 np np --- --- -- --     Bridges   10x5" np 10x5" 15x5"                    QL stretch  Sit  3x30" np np np np       Supine marches   nv attemtped W/ cuff  10xea 10x2  w/cuff       Abdominal bracing w/ pressure cuff    10x10" 10x10" 10x 10"       Hip flexor stretch - rachael     1' ea   1' ea       Stability in 1/2 kneel      1' ea       Standing march DLS      38Q       PPT at wall and w/ cane      instructed                                 Posture awareness See below T/o session       2-28: discussed sitting position at work - states she leans foward toward her computer; advised to pay attention to neutral lumbar position, avoiding excessive lordosis    Modalities   2/19 2/21 2/26 2/28 3/6         CP post sl

## 2018-03-07 ENCOUNTER — APPOINTMENT (OUTPATIENT)
Dept: PHYSICAL THERAPY | Facility: CLINIC | Age: 53
End: 2018-03-07
Payer: COMMERCIAL

## 2018-03-12 ENCOUNTER — APPOINTMENT (OUTPATIENT)
Dept: PHYSICAL THERAPY | Facility: CLINIC | Age: 53
End: 2018-03-12
Payer: COMMERCIAL

## 2018-03-14 ENCOUNTER — OFFICE VISIT (OUTPATIENT)
Dept: PHYSICAL THERAPY | Facility: CLINIC | Age: 53
End: 2018-03-14
Payer: COMMERCIAL

## 2018-03-14 DIAGNOSIS — D36.10: Primary | ICD-10-CM

## 2018-03-14 PROCEDURE — 97110 THERAPEUTIC EXERCISES: CPT | Performed by: PHYSICAL THERAPIST

## 2018-03-14 NOTE — PROGRESS NOTES
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                   Subjective: states that she did something a few days ago and flared up her pain but it has since subsided  Sx's aggravated by driving  Sleeping is getting better    Objective: See treatment diary below  Assessment: able to perform exercises in therapy without provoking symptoms as long as she is able to stabilize lumbar spine; attempted chops in 1/2 kneel and patient had much difficulty stabilizing - some R L/S pain afterward    Plan: Progress treatment as tolerated   Emphasis on strengthening/ stabilization and hip flexor stretching    Precautions: none    Daily Treatment Diary     Manual  2/14 2/19 2/21 2/26 2/28 3/6 3/14      TrP release L gluteal mm/piriformis  JK np No TrP's noted np np np      R UPA's lumbar  PA's lumbar; LUPA L 5  JR performed performed np np np      MET innom rot and sacral extension    Resist R flex/ L ext np np np                   Leg pull R    performed np np np          Exercise Diary  2/14 2/19 2/21 2/26 2/28 3/6 3/14      Gluteal stretch  3x30"  np np np 3x30"      Piriformis stretch  3x30" 3x30" np np np 3x30"      Sciatic nerve slider  10 15x 15x np np np      DLS: abdominal bracing  10x10" w/ ex 10x10"pressure cuff 10x 10"  W/ cuff 10x 10" 10x10"      Quadruped alt UE / LE lifts  10 10x cues np 10x 10x2 10x2      Mini squats  10x5" 10x5" np  10# kb 10# kb      clamshells  10x5" 15x5"ea attempted 10x 10x 10" 10x10"      SL abd  10 np np --- --- -- --     Bridges   10x5" np 10x5" 15x5" 10x5"                   QL stretch  Sit  3x30" np np np np np      Supine marches   nv attemtped W/ cuff  10xea 10x2  w/cuff 10x2 w/cuff      Abdominal bracing w/ pressure cuff    10x10" 10x10" 10x 10" 10x10"      Hip flexor stretch - rachael     1' ea   1 ea ea Stability in 1/2 kneel      1' ea 1' ea      Standing zack DLS      96Y 07G      PPT at wall and w/ cane      instructed np      Chops in 1/2 kneel       10x ea                   Posture awareness     See below T/o session --      2-28: discussed sitting position at work - states she leans foward toward her computer; advised to pay attention to neutral lumbar position, avoiding excessive lordosis    Modalities   2/19 2/21 2/26 2/28 3/6 3/14        CP post sl

## 2018-03-19 ENCOUNTER — OFFICE VISIT (OUTPATIENT)
Dept: PHYSICAL THERAPY | Facility: CLINIC | Age: 53
End: 2018-03-19
Payer: COMMERCIAL

## 2018-03-19 DIAGNOSIS — D36.10: Primary | ICD-10-CM

## 2018-03-19 PROCEDURE — 97110 THERAPEUTIC EXERCISES: CPT | Performed by: PHYSICAL THERAPIST

## 2018-03-19 PROCEDURE — 97010 HOT OR COLD PACKS THERAPY: CPT | Performed by: PHYSICAL THERAPIST

## 2018-03-19 PROCEDURE — 97140 MANUAL THERAPY 1/> REGIONS: CPT | Performed by: PHYSICAL THERAPIST

## 2018-03-19 NOTE — PROGRESS NOTES
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        Start Time:   Stop Time:   Total time in clinic (min): 64 minutes    Subjective: felt fine today until she drove to therapy - symptoms flared up by driving; Pain lateral R L/S region, side of hip and sometimes in the front of the hip; sometimes in the lateral-anterior thigh  Objective: See treatment diary below       Assessment: ability to stabilize in 1/2 kneel is improving; attempted psoas release w/ some relief attained - was able to bring knee to chest in supine w/out pain after psoas release but continued to report lateral hip / buttock region pain    Plan: Progress treatment as tolerated    Precautions: none    Daily Treatment Diary     Manual  2/14 2/19 2/21 2/26 2/28 3/6 3/14 3/19     TrP release L gluteal mm/piriformis  JK np No TrP's noted np np np no TrP's noted     R UPA's lumbar  PA's lumbar; LUPA L 5  JR performed performed np np np JR     MET innom rot and sacral extension    Resist R flex/ L ext np np np Resist R ext/ L flex     Psoas release        JR     Leg pull R    performed np np np np         Exercise Diary  2/14 2/19 2/21 2/26 2/28 3/6 3/14 3/19     Gluteal stretch  3x30"  np np np 3x30" 3x30"     Piriformis stretch  3x30" 3x30" np np np 3x30" 3x30"     Sciatic nerve slider  10 15x 15x np np np np                  Quadruped alt UE / LE lifts  10 10x cues np 10x 10x2 10x2 10x2     Mini squats  10x5" 10x5" np  10# kb 10# kb np     clamshells  10x5" 15x5"ea attempted 10x 10x 10" 10x10" np     SL abd  10 np np --- --- -- --     Bridges   10x5" np 10x5" 15x5" 10x5" 10x5"                  QL stretch  Sit  3x30" np np np np np np     Supine marches   nv attemtped W/ cuff  10xea 10x2  w/cuff 10x2 w/cuff 10x2 w/ cuff     Abdominal bracing w/ pressure cuff    10x10" 10x10" 10x 10" 10x10" 10x10"     Hip flexor stretch - rachael     1' ea   1' ea 1'ea standing 1' R only     Stability in 1/2 kneel      1' ea 1' ea 1'ea     Standing zack DLS      37Q 58K np     PPT at wall and w/ cane      instructed np np     Chops in 1/2 kneel w/ pball       10x ea np                  Posture awareness     See below T/o session --      2-28: discussed sitting position at work - states she leans foward toward her computer; advised to pay attention to neutral lumbar position, avoiding excessive lordosis    Modalities   2/19 2/21 2/26 2/28 3/6 3/14 3/19       CP post sl      CP R L/S in SL

## 2018-03-21 ENCOUNTER — APPOINTMENT (OUTPATIENT)
Dept: PHYSICAL THERAPY | Facility: CLINIC | Age: 53
End: 2018-03-21
Payer: COMMERCIAL

## 2018-03-26 ENCOUNTER — APPOINTMENT (OUTPATIENT)
Dept: PHYSICAL THERAPY | Facility: CLINIC | Age: 53
End: 2018-03-26
Payer: COMMERCIAL

## 2018-03-28 ENCOUNTER — EVALUATION (OUTPATIENT)
Dept: PHYSICAL THERAPY | Facility: CLINIC | Age: 53
End: 2018-03-28
Payer: COMMERCIAL

## 2018-03-28 DIAGNOSIS — D36.10 NEUROFIBROMA: Primary | ICD-10-CM

## 2018-03-28 PROCEDURE — 97140 MANUAL THERAPY 1/> REGIONS: CPT | Performed by: PHYSICAL THERAPIST

## 2018-03-28 PROCEDURE — G8991 OTHER PT/OT GOAL STATUS: HCPCS | Performed by: PHYSICAL THERAPIST

## 2018-03-28 PROCEDURE — 97112 NEUROMUSCULAR REEDUCATION: CPT | Performed by: PHYSICAL THERAPIST

## 2018-03-28 PROCEDURE — G8990 OTHER PT/OT CURRENT STATUS: HCPCS | Performed by: PHYSICAL THERAPIST

## 2018-03-28 NOTE — PROGRESS NOTES
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        Start Time:   Stop Time:   Total time in clinic (min): 53 minutes    Assessment/Plan    This patient demonstrates improvement since beginning therapy; Range of Motion has increased and strength and function are improving  Pain has decreased  Patient is progressing toward LTG's and will benefit from continued therapy to reduce pain and restore function  Frequency: 2 times weekly  Duration:  2-4 weeks    STGs: 3-4 weeks  1  Increase ROM R hip IR to at least 30 degrees - MET  2  Decrease pain R buttock and RLE 2-3 levels - MET  3  Restore pain-free trunk AROM- partially met  LTGs: 6-8 weeks  1  Independent HEP - partially met  2  Return to usual activity level without pain > 2/10 - partially met  3   Increase FOTO score to 65 - MET    Subjective   Pain R buttock region; not shooting all the way down the leg like it was; intermittent shooting pain in the upper to mid thigh;  much better since psoas release lv; is able to drive with less symptoms provocation; when symptoms occur she is able to reduce them by changing her back position  aggravated by prolonged standing; some days worse than others      Objective   Pain scale: 0-5/10 R buttock/thigh    Function:  Able to sleep through the night without awakening due to pain; is now able to lie flat (supine)- was not able previously  Able to carry laundry baskets without provoking pain    Posture:  Trunk ROM     Flexion AROM    Min limitation     Extension  AROM    Min limitation *     R lateral flexion AROM    WNL*     L lateral flexion AROM     WNL*     R rotation AROM   WNL     L rotation AROM   WNL  * comparable sign        Special tests:    PSLR (-)      Palpation:  Pain w/ PA L3,4, 5; decreased tightness/ tenderness R gluteal region    Myotomal testing    L2 - Psoas = 5/5    L3 - Quadriceps = 5/5    L4/5 - Ant   Tibialis = 5/5    L5 - Glut med = 5/5 (pain w/ resisted R hip abd)    L5 - EHL = 5/5    S1-2 -  Glut brenda = 5/5 (pain w/ resisted R hip extension)       ROM painful R hip IR 30 degrees without pain; otherwise WFL to WNL BLE    Left lower extremity ROM / strength WFL    Precautions: none    Daily Treatment Diary     Manual  2/14 2/19 2/21 2/26 2/28 3/6 3/14 3/19 3/28    TrP release L gluteal mm/piriformis  JK np No TrP's noted np np np no TrP's noted np    R UPA's lumbar  PA's lumbar; LUPA L 5  JR performed performed np np np JR JR    MET innom rot and sacral extension    Resist R flex/ L ext np np np Resist R ext/ L flex np    Psoas release        JR JR    Leg pull R    performed np np np np np        Exercise Diary  2/14 2/19 2/21 2/26 2/28 3/6 3/14 3/19 3/28    Gluteal stretch  3x30"  np np np 3x30" 3x30" hep    Piriformis stretch  3x30" 3x30" np np np 3x30" 3x30" hep    Sciatic nerve slider  10 15x 15x np np np np np                 Quadruped alt UE / LE lifts  10 10x cues np 10x 10x2 10x2 10x2 10x2    Mini squats  10x5" 10x5" np  10# kb 10# kb np 10#kb  15x    clamshells  10x5" 15x5"ea attempted 10x 10x 10" 10x10" np np    SL abd  10 np np --- --- -- -- --    Bridges   10x5" np 10x5" 15x5" 10x5" 10x5" np                 QL stretch  Sit  3x30" np np np np np np np    Supine marches   nv attemtped W/ cuff  10xea 10x2  w/cuff 10x2 w/cuff 10x2 w/ cuff np    Abdominal bracing w/ pressure cuff    10x10" 10x10" 10x 10" 10x10" 10x10" 10x10"    Hip flexor stretch - rachael     1' ea   1' ea 1'ea standing 1' R only B 1-2' ea    Stability in 1/2 kneel      1' ea 1' ea 1'ea 1-2'ea    Standing zack DLS      94R 77C np np    PPT at wall and w/ cane      instructed np np np    Chops in 1/2 kneel w/ pball       10x ea np nv                 Posture awareness     See below T/o session -- w/ex w/ex    2-28: discussed sitting position at work - states she leans foward toward her computer; advised to pay attention to neutral lumbar position, avoiding excessive lordosis    Modalities   2/19 2/21 2/26 2/28 3/6 3/14 3/19 3/28      CP post sl      CP R L/S in SL

## 2018-04-02 ENCOUNTER — APPOINTMENT (OUTPATIENT)
Dept: PHYSICAL THERAPY | Facility: CLINIC | Age: 53
End: 2018-04-02
Payer: COMMERCIAL

## 2018-04-04 ENCOUNTER — OFFICE VISIT (OUTPATIENT)
Dept: FAMILY MEDICINE CLINIC | Facility: CLINIC | Age: 53
End: 2018-04-04
Payer: COMMERCIAL

## 2018-04-04 ENCOUNTER — APPOINTMENT (OUTPATIENT)
Dept: PHYSICAL THERAPY | Facility: CLINIC | Age: 53
End: 2018-04-04
Payer: COMMERCIAL

## 2018-04-04 VITALS
DIASTOLIC BLOOD PRESSURE: 94 MMHG | SYSTOLIC BLOOD PRESSURE: 140 MMHG | TEMPERATURE: 98 F | RESPIRATION RATE: 16 BRPM | HEART RATE: 78 BPM

## 2018-04-04 DIAGNOSIS — J30.9 ALLERGIC RHINITIS, UNSPECIFIED CHRONICITY, UNSPECIFIED SEASONALITY, UNSPECIFIED TRIGGER: ICD-10-CM

## 2018-04-04 DIAGNOSIS — K52.9 GASTROENTERITIS: Primary | ICD-10-CM

## 2018-04-04 PROBLEM — E78.5 HYPERLIPEMIA: Status: ACTIVE | Noted: 2017-10-06

## 2018-04-04 PROBLEM — K58.9 IRRITABLE BOWEL SYNDROME (IBS): Status: ACTIVE | Noted: 2017-07-20

## 2018-04-04 PROBLEM — N83.201 BILATERAL OVARIAN CYSTS: Status: ACTIVE | Noted: 2017-08-07

## 2018-04-04 PROBLEM — L72.3 INFLAMED SEBACEOUS CYST: Chronic | Status: RESOLVED | Noted: 2017-08-30 | Resolved: 2018-04-04

## 2018-04-04 PROBLEM — N83.202 BILATERAL OVARIAN CYSTS: Status: ACTIVE | Noted: 2017-08-07

## 2018-04-04 PROBLEM — K21.9 ESOPHAGEAL REFLUX: Status: ACTIVE | Noted: 2017-01-06

## 2018-04-04 PROBLEM — R74.01 ELEVATED ALT MEASUREMENT: Status: ACTIVE | Noted: 2017-04-04

## 2018-04-04 PROBLEM — K57.90 DIVERTICULOSIS: Status: ACTIVE | Noted: 2017-09-21

## 2018-04-04 PROCEDURE — 99213 OFFICE O/P EST LOW 20 MIN: CPT | Performed by: NURSE PRACTITIONER

## 2018-04-04 PROCEDURE — 1036F TOBACCO NON-USER: CPT | Performed by: NURSE PRACTITIONER

## 2018-04-04 RX ORDER — ESOMEPRAZOLE MAGNESIUM 40 MG/1
40 CAPSULE, DELAYED RELEASE ORAL DAILY
Refills: 6 | COMMUNITY
Start: 2018-03-04 | End: 2018-04-04 | Stop reason: ALTCHOICE

## 2018-04-04 RX ORDER — MOMETASONE FUROATE 50 UG/1
2 SPRAY, METERED NASAL AS NEEDED
Qty: 17 G | Refills: 0 | Status: SHIPPED | OUTPATIENT
Start: 2018-04-04 | End: 2018-08-21

## 2018-04-04 NOTE — LETTER
April 4, 2018     Patient: Joaquín Joseph   YOB: 1965   Date of Visit: 4/4/2018       To Whom it May Concern:    Boni Acevedo is under my professional care  She was seen in my office on 4/4/2018  She may return to work on 04/05/2018  If you have any questions or concerns, please don't hesitate to call           Sincerely,          KENY Hamilton        CC: No Recipients

## 2018-04-04 NOTE — PROGRESS NOTES
FAMILY PRACTICE OFFICE VISIT       NAME: Aileen Snyder  AGE: 47 y o  SEX: female       : 1965        MRN: [id number]    DATE: 2018  TIME: 4:56 PM    Assessment and Plan     Problem List Items Addressed This Visit     Allergic rhinitis    Relevant Medications    mometasone (NASONEX) 50 mcg/act nasal spray      Other Visit Diagnoses     Gastroenteritis    -  Primary          1  Gastroenteritis  Likely viral in origin  Declines medication for nausea  Continue supportive care: push fluids, bland diet, slowly advance as tolerated, small frequent meals/snacks  If symptoms worsen or do not improve over the next few days, instructed to call the office  Note provided excusing her from work today  2  Allergic rhinitis, unspecified chronicity, unspecified seasonality, unspecified trigger  mometasone (NASONEX) 50 mcg/act nasal spray           Chief Complaint     Chief Complaint   Patient presents with    Cold Like Symptoms     Patient is here c/o nausea, chills, body aches and fatigue x's 1+ days  History of Present Illness     Patient presents today after developing nausea, abdominal cramping, body aches, headache, fatigue, and chills yesterday  No vomiting or diarrhea  She did not check her temperature  Abdominal cramping and nausea have improved today  She still feels fatigued, achy, and run down today  Review of Systems   Review of Systems   Constitutional: Positive for chills and fatigue  Negative for fever  HENT: Negative  Respiratory: Negative  Cardiovascular: Negative  Gastrointestinal: Positive for abdominal pain and nausea  Negative for diarrhea and vomiting  As noted in HPI   Genitourinary: Negative for difficulty urinating  Musculoskeletal: Positive for myalgias  Skin: Negative for rash  Neurological: Positive for headaches  Negative for dizziness         Active Problem List     Patient Active Problem List   Diagnosis    Neurofibroma    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    Saucedo's esophagus without dysplasia    Hepatic steatosis    Irritable bowel syndrome (IBS)    Hyperlipemia    Generalized anxiety disorder    Esophageal reflux    Elevated ALT measurement    Diverticulosis    Bilateral ovarian cysts    Allergic rhinitis       Past Medical History:  Past Medical History:   Diagnosis Date    Abdominal pain     Anxiety     Back pain     Back pain     Saucedo's esophagus     Bilateral ovarian cysts     Cyst of buttocks     Diverticulosis     GERD (gastroesophageal reflux disease)     Hepatic steatosis     Hyperlipidemia     IBS (irritable bowel syndrome)     IBS (irritable bowel syndrome)     Liver disease     fatty liver    Malignant hyperthermia due to anesthesia     Neuroma     Pink eye disease of both eyes     Chronic and was recently treated    Seasonal allergies     Squamous cell carcinoma     Neck       Past Surgical History:  Past Surgical History:   Procedure Laterality Date    COLONOSCOPY      ESOPHAGOGASTRODUODENOSCOPY      LIPOMA RESECTION N/A 8/30/2017    Procedure: EXCISION GLUTEAL MASS;  Surgeon: Taran Zayas MD;  [facility];  Service: General    MOHS SURGERY Left     neck    NERVE REPAIR Right 11/17/2017    Procedure: 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 (FROZEN SECTION); Surgeon: Becca Peña MD;  [facility];  Service: Neurosurgery    MA COLON[facility] DX W/[facility] WHEN PFRMD N/A 6/2/2017    Procedure: EGD AND COLONOSCOPY;  Surgeon: Contreras Denise MD;  [facility]; Service: Gastroenterology       Family History:  Family History   Problem Relation Age of Onset    Cancer Father     Colon cancer Father     Colon polyps Mother        Social History:  Social History     Social History    Marital status: /Civil Union     Spouse name: N/A    Number of children: N/A    Years of education: N/A     Occupational History    Not on file       Social History Main Topics    Smoking status: Never Smoker    Smokeless tobacco: Never Used    Alcohol use Yes      Comment: 5 monthly    Drug use: No    Sexual activity: Not on file     Other Topics Concern    Not on file     Social History Narrative    No narrative on file       I have reviewed the patient's medical history in detail; there are no changes to the history as noted in the electronic medical record  Objective     Vitals:    04/04/18 1211   BP: 140/94   Pulse: 78   Resp: 16   Temp: 98 °F (36 7 °C)   TempSrc: Tympanic     Wt Readings from Last 3 Encounters:   03/05/18 85 1 kg (187 lb 9 6 oz)   02/06/18 84 4 kg (186 lb)   01/22/18 81 6 kg (180 lb)     There is no height or weight on file to calculate BMI  Physical Exam   Constitutional: She appears well-developed and well-nourished  No distress  HENT:   Head: Normocephalic and atraumatic  Right Ear: Tympanic membrane and ear canal normal    Left Ear: Tympanic membrane and ear canal normal    Nose: Nose normal    Mouth/Throat: Oropharynx is clear and moist    Eyes: Conjunctivae are normal    Neck: Normal range of motion  Neck supple  Cardiovascular: Normal rate, regular rhythm and normal heart sounds  No murmur heard  Pulmonary/Chest: Effort normal and breath sounds normal    Abdominal: Soft  Bowel sounds are normal  There is no tenderness  Musculoskeletal: Normal range of motion  She exhibits no edema  Lymphadenopathy:     She has no cervical adenopathy  Psychiatric: She has a normal mood and affect  Nursing note and vitals reviewed        ALLERGIES:  Allergies   Allergen Reactions    Pollen Extract     Sulfa Antibiotics Rash       Current Medications     Current Outpatient Prescriptions   Medication Sig Dispense Refill    acetaminophen (TYLENOL) 500 mg tablet Take 500 mg by mouth every 6 (six) hours as needed for mild pain      ALPRAZolam (XANAX) 0 5 mg tablet Take 0 5 mg by mouth every 4 (four) hours as needed for anxiety      cetirizine (ZyrTEC) 10 mg tablet Take 10 mg by mouth daily      mometasone (NASONEX) 50 mcg/act nasal spray 2 sprays into each nostril as needed (allergic rhinitis) 17 g 0    omeprazole (PriLOSEC) 40 MG capsule Take 40 mg by mouth daily at bedtime         No current facility-administered medications for this visit            Health Maintenance     Health Maintenance   Topic Date Due    HIV SCREENING  1965    Hepatitis C Screening  1965    DTaP,Tdap,and Td Vaccines (1 - Tdap) 07/26/1985    INFLUENZA VACCINE  09/01/2017    PT PLAN OF CARE  04/27/2018    Depression Screening PHQ-9  02/14/2019    COLONOSCOPY  06/02/2022     Immunization History   Administered Date(s) Administered    Influenza Quadrivalent Preservative Free 3 years and older IM 11/07/2014       KENY Guerrero

## 2018-04-09 ENCOUNTER — OFFICE VISIT (OUTPATIENT)
Dept: PHYSICAL THERAPY | Facility: CLINIC | Age: 53
End: 2018-04-09
Payer: COMMERCIAL

## 2018-04-09 DIAGNOSIS — D36.10: ICD-10-CM

## 2018-04-09 DIAGNOSIS — D36.10 NEUROFIBROMA: Primary | ICD-10-CM

## 2018-04-09 PROCEDURE — 97112 NEUROMUSCULAR REEDUCATION: CPT | Performed by: PHYSICAL THERAPIST

## 2018-04-09 PROCEDURE — 97140 MANUAL THERAPY 1/> REGIONS: CPT | Performed by: PHYSICAL THERAPIST

## 2018-04-09 NOTE — PROGRESS NOTES
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                   Subjective: Pt reports feeling better during the past 2 weeks  PT is helping  Objective: See treatment diary below      Assessment: Tolerated treatment well  Patient displays excellent form with all exercises  Pain level is lessening  Can still benefit from higher level core strengthening  Plan: Continue to progress core strength       Precautions: none    Daily Treatment Diary     Manual  2/14 2/19 2/21 2/26 2/28 3/6 3/14 3/19 3/28 4/9   TrP release L gluteal mm/piriformis  JK np No TrP's noted np np np no TrP's noted np    R UPA's lumbar  PA's lumbar; LUPA L 5  JR performed performed np np np JR JR JA   MET innom rot and sacral extension    Resist R flex/ L ext np np np Resist R ext/ L flex np    Psoas release        JR JR JA   Leg pull R    performed np np np np np        Exercise Diary  2/14 2/19 2/21 2/26 2/28 3/6 3/14 3/19 3/28 4/9   Gluteal stretch  3x30"  np np np 3x30" 3x30" hep    Piriformis stretch  3x30" 3x30" np np np 3x30" 3x30" hep    Sciatic nerve slider  10 15x 15x np np np np np                 Quadruped alt UE / LE lifts  10 10x cues np 10x 10x2 10x2 10x2 10x2 10x2   Mini squats  10x5" 10x5" np  10# kb 10# kb np 10#kb  15x    clamshells  10x5" 15x5"ea attempted 10x 10x 10" 10x10" np np 10x10"   SL abd  10 np np --- --- -- -- --    Bridges   10x5" np 10x5" 15x5" 10x5" 10x5" np 10x5"                QL stretch  Sit  3x30" np np np np np np np    Supine marches   nv attemtped W/ cuff  10xea 10x2  w/cuff 10x2 w/cuff 10x2 w/ cuff np    Abdominal bracing w/ pressure cuff    10x10" 10x10" 10x 10" 10x10" 10x10" 10x10" 10x10"   Hip flexor stretch - rachael     1' ea   1 ea 1'ea standing 1' R only B 1-2' ea B 1-2' ea   Stability in  kneel      1' ea 1' ea 1'ea 1-2'ea    Standing zack DLS      99Y 46P np np    PPT at wall and w/ cane      instructed np np np    Chops in 1/2 kneel w/ pball       10x ea np nv                 Posture awareness     See below T/o session -- w/ex w/ex    2-28: discussed sitting position at work - states she leans foward toward her computer; advised to pay attention to neutral lumbar position, avoiding excessive lordosis    Modalities   2/19 2/21 2/26 2/28 3/6 3/14 3/19 3/28      CP post sl      CP R L/S in SL

## 2018-04-11 ENCOUNTER — OFFICE VISIT (OUTPATIENT)
Dept: PHYSICAL THERAPY | Facility: CLINIC | Age: 53
End: 2018-04-11
Payer: COMMERCIAL

## 2018-04-11 DIAGNOSIS — D36.10 NEUROFIBROMA: Primary | ICD-10-CM

## 2018-04-11 PROCEDURE — 97140 MANUAL THERAPY 1/> REGIONS: CPT | Performed by: PHYSICAL THERAPIST

## 2018-04-11 PROCEDURE — 97112 NEUROMUSCULAR REEDUCATION: CPT | Performed by: PHYSICAL THERAPIST

## 2018-04-11 NOTE — PROGRESS NOTES
Daily Note     Today's date: 2018  Patient name: Fletcher Flowers  : 1965  MRN: [id number]  Referring provider: Lissett Jiang MD  Dx:   Encounter Diagnosis     ICD-10-CM    1  Neurofibroma D36 10                   Subjective: still have pain in the low back (R sided) when she lifts her R leg up but it is not as intense/ frequent as it was  Is now able to sleep on her back; bothers her sometimes but it is not as excruciating as it was    Objective: See treatment diary below      Assessment: Tolerated treatment well  Decreased pain w/ psoas release  Good response to stabilization exercises; reduced symptoms when in lumbar neutral    Plan: Continue to progress core strength       Precautions: none    Daily Treatment Diary     Manual              TrP release prn STM R QL            R UPA's lumbar  PA's lumbar; LUPA L 5 JR            MET innom rot and sacral extension             Psoas release JR            Leg pull R                 Exercise Diary   3/6 3/14 3/19 3/28 4   Gluteal stretch hep            Piriformis stretch Hep            Sciatic nerve slider                          Quadruped alt UE / LE lifts 10x2            Mini squats 10#kb 20x            clamshells 10x 10"            SL abd             Bridges np            Standing march  Lumbar neutral 10xw/ stick            QL stretch sit 3x30"            Supine marches 30x w/ cuff            Abdominal bracing w/ cuff 10x    10"            Hip flexor stretch - rachael 1-2'ea            Stability in 1/2 kneel 1' ea                         PPT at wall and w/ cane             Chops in 1/2 kneel w/ pball 10xea                         Posture awareness             -: discussed sitting position at work - states she leans foward toward her computer; advised to pay attention to neutral lumbar position, avoiding excessive lordosis    Modalities

## 2018-04-16 ENCOUNTER — APPOINTMENT (OUTPATIENT)
Dept: PHYSICAL THERAPY | Facility: CLINIC | Age: 53
End: 2018-04-16
Payer: COMMERCIAL

## 2018-04-18 ENCOUNTER — OFFICE VISIT (OUTPATIENT)
Dept: PHYSICAL THERAPY | Facility: CLINIC | Age: 53
End: 2018-04-18
Payer: COMMERCIAL

## 2018-04-18 DIAGNOSIS — D36.10 NEUROFIBROMA: Primary | ICD-10-CM

## 2018-04-18 DIAGNOSIS — D36.10: ICD-10-CM

## 2018-04-18 PROCEDURE — 97110 THERAPEUTIC EXERCISES: CPT

## 2018-04-18 PROCEDURE — 97140 MANUAL THERAPY 1/> REGIONS: CPT

## 2018-04-18 NOTE — PROGRESS NOTES
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                   Subjective: Reports since psoas rls she does not have the calf pain  C/o hip and LB on the R side when lifting her leg to put her foot from the gas to the break  Pt reports limited for time tonight  Objective: See treatment diary below  Shortened program     Assessment: Tolerated treatment well  Patient still unsteady w/ single limb or 1/2 kneeling ex's  Reported hip much looser post manuals  Plan: Continue per plan of care  Progress treatment as tolerated      Daily Treatment Diary     Manual             TrP release prn STM R QL  glut med, Ql, piriformis           R UPA's lumbar  PA's lumbar; LUPA L 5 JR            MET innom rot and sacral extension             Psoas release JR            Leg pull R                 Exercise Diary             Gluteal stretch hep            Piriformis stretch Hep            Sciatic nerve slider                          Quadruped alt UE / LE lifts 10x2 10x2           Mini squats 10#kb 20x 10# kb  20x           clamshells 10x 10"            SL abd             Bridges np            Standing march  Lumbar neutral 10xw/ stick 10x w//stk           QL stretch sit 3x30"            Supine marches 30x w/ cuff            Abdominal bracing w/ cuff 10x    10" 10x10"           Hip flexor stretch - rachael 1-2'ea 1-2" ea           Stability in 1/2 kneel 1' ea 1' ea                        PPT at wall and w/ cane             Chops in 1/2 kneel w/ pball 10xea nv                        Posture awareness

## 2018-04-23 ENCOUNTER — OFFICE VISIT (OUTPATIENT)
Dept: PHYSICAL THERAPY | Facility: CLINIC | Age: 53
End: 2018-04-23
Payer: COMMERCIAL

## 2018-04-23 DIAGNOSIS — D36.10: Primary | ICD-10-CM

## 2018-04-23 PROCEDURE — G8979 MOBILITY GOAL STATUS: HCPCS | Performed by: PHYSICAL THERAPIST

## 2018-04-23 PROCEDURE — 97140 MANUAL THERAPY 1/> REGIONS: CPT | Performed by: PHYSICAL THERAPIST

## 2018-04-23 PROCEDURE — G8980 MOBILITY D/C STATUS: HCPCS | Performed by: PHYSICAL THERAPIST

## 2018-04-23 PROCEDURE — 97110 THERAPEUTIC EXERCISES: CPT | Performed by: PHYSICAL THERAPIST

## 2018-04-23 PROCEDURE — 97112 NEUROMUSCULAR REEDUCATION: CPT | Performed by: PHYSICAL THERAPIST

## 2018-04-23 NOTE — PROGRESS NOTES
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                   Assessment/Plan   This patient demonstrates improvement since beginning therapy  She has achieved a significant reduction in pain and has met most of the LTG's which were set for her  She is independent with HEP and is ready for DC to self-directed program       STGs: 3-4 weeks  1  Increase ROM R hip IR to at least 30 degrees - MET  2  Decrease pain R buttock and RLE 2-3 levels - MET  3  Restore pain-free trunk AROM- partially met  LTGs: 6-8 weeks  1  Independent HEP - MET  2  Return to usual activity level without pain > 2/10 - partially met  3  Increase FOTO score to 65 - MET        Subjective   Feels much better than at the start of therapy; still has pain in the R L/S area, aggravated by certain movements (raising her leg, arching her back); sometimes pain wraps around to the front of the thigh  When pain occurs it is brief and then resolves with position change    Objective     Pain scale: 0-4/10 R buttock/thigh    Function:  Able to sleep through the night without awakening due to pain; is now able to lie flat (supine)  Able to carry laundry baskets without provoking pain  No longer has pain w/ driving    Posture:  Trunk ROM     Flexion AROM    WNL     Extension  AROM    WNL *     R lateral flexion AROM    WNL*      L lateral flexion AROM     WNL     R rotation AROM   WNL     L rotation AROM   WNL  * comparable sign        Special tests:    PSLR (-)      Palpation:  No pain w/ lumbar PA's; no tightness/  tenderness R gluteal region    Myotomal testing    L2 - Psoas = 5/5    L3 - Quadriceps = 5/5    L4/5 - Ant   Tibialis = 5/5    L5 - Glut med = 5/5     L5 - EHL = 5/5    S1-2 -  Glut brenda = 5/5        ROM R hip extension -10 degrees; otherwise WFL to WNL BLE    Left lower extremity ROM / strength [facility]    Daily Treatment Diary     Manual  4/11 4/18 4/23            TrP release prn STM R QL  glut med, Ql, piriformis np          R UPA's lumbar  PA's lumbar; LUPA L 5 JR  JR                       Psoas release JR                             Exercise Diary  4/11 4/18 4/23          Gluteal stretch hep            Piriformis stretch Hep            Femoral nerve glides   10x                       Quadruped alt UE / LE lifts 10x2 10x2 10x2          Mini squats 10#kb 20x 10# kb  20x 10#kb  20x          clamshells 10x 10"            SL abd             Bridges np  10x          Standing march  Lumbar neutral 10xw/ stick 10x w//stk np          QL stretch sit 3x30" --           Supine marches 30x w/ cuff            Abdominal bracing w/ cuff 10x    10" 10x10"           Hip flexor stretch - rachael 1-2'ea 1-2' ea 1-2'          Stability in 1/2 kneel 1' ea 1' ea hep                       PPT at wall and w/ cane             Chops in 1/2 kneel w/ pball 10xea nv 10x                       Posture awareness

## 2018-04-25 ENCOUNTER — APPOINTMENT (OUTPATIENT)
Dept: PHYSICAL THERAPY | Facility: CLINIC | Age: 53
End: 2018-04-25
Payer: COMMERCIAL

## 2018-08-09 DIAGNOSIS — F41.1 GAD (GENERALIZED ANXIETY DISORDER): Primary | ICD-10-CM

## 2018-08-09 RX ORDER — ALPRAZOLAM 0.5 MG/1
TABLET ORAL
Qty: 45 TABLET | Refills: 1 | Status: SHIPPED | OUTPATIENT
Start: 2018-08-09 | End: 2019-10-01 | Stop reason: SDUPTHER

## 2018-08-12 DIAGNOSIS — K21.9 CHRONIC GERD: Primary | ICD-10-CM

## 2018-08-12 RX ORDER — ESOMEPRAZOLE MAGNESIUM 40 MG/1
CAPSULE, DELAYED RELEASE ORAL
Qty: 30 CAPSULE | Refills: 6 | Status: SHIPPED | OUTPATIENT
Start: 2018-08-12 | End: 2018-08-21

## 2018-08-15 ENCOUNTER — HOSPITAL ENCOUNTER (OUTPATIENT)
Dept: MRI IMAGING | Facility: HOSPITAL | Age: 53
Discharge: HOME | End: 2018-08-15
Attending: NEUROLOGICAL SURGERY
Payer: COMMERCIAL

## 2018-08-15 DIAGNOSIS — D21.9 MYXOMA DETERMINED BY BIOPSY OF MUSCLE: ICD-10-CM

## 2018-08-15 PROCEDURE — A9585 GADOBUTROL INJECTION: HCPCS | Performed by: NEUROLOGICAL SURGERY

## 2018-08-15 PROCEDURE — 72197 MRI PELVIS W/O & W/DYE: CPT

## 2018-08-15 RX ADMIN — GADOBUTROL 8 ML: 604.72 INJECTION INTRAVENOUS at 19:03

## 2018-08-21 ENCOUNTER — OFFICE VISIT (OUTPATIENT)
Dept: NEUROSURGERY | Facility: CLINIC | Age: 53
End: 2018-08-21
Payer: COMMERCIAL

## 2018-08-21 VITALS
DIASTOLIC BLOOD PRESSURE: 68 MMHG | BODY MASS INDEX: 33.49 KG/M2 | SYSTOLIC BLOOD PRESSURE: 125 MMHG | RESPIRATION RATE: 16 BRPM | HEART RATE: 76 BPM | HEIGHT: 63 IN | TEMPERATURE: 98.3 F | WEIGHT: 189 LBS

## 2018-08-21 DIAGNOSIS — D21.9 MYXOMA DETERMINED BY BIOPSY OF MUSCLE: Primary | ICD-10-CM

## 2018-08-21 PROCEDURE — 99213 OFFICE O/P EST LOW 20 MIN: CPT | Performed by: NEUROLOGICAL SURGERY

## 2018-08-21 NOTE — PROGRESS NOTES
Neurosurgery Office Note  Aileen Snyder is a 46 y o  female    Type of Visit: No chief complaint on file  ASSESSMENT / PLAN  There are no diagnoses linked to this encounter  DISCUSSION SUMMARY  Patient is status post a 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  We are following this closely for recurrence  Current studies demonstrate no recurrence in no visual tumor  He is doing fairly well with occasional paresthesias of her leg  Her preoperative symptoms are now all gone  I recommended that she continue the principles of physical therapy as well as local massage to the incision region  CHIEF COMPLAINT  Patient presents for 6 month f/u regarding resection of a large intramuscular myxoma 11/17/2017  Diagnoses: Myxoma of right thigh    SX INFO  11/17/17 - 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 (FROZEN SECTION) (Right Buttocks)  Final Path Dx: soft tissue pathology service, Dr Kishore Westbrook, and we would regard this as a intramuscular myxoma  Tumor cells proved negative for S-100 protein, EMA and calretinin expression with only patchy GLUT1 labeling that does not carry diagnostic specificity  This does not represent a myxoid schwannoma, neurofibroma or the entity known as nerve sheath myxoma  Collin Way MD    HISTORY OF PRESENT ILLNESS  She describes a fluttering feeling in her leg at night which is likely local muscle spasms  (Fibrillation)  There is no return of the mass  She has had no burning pain  She does not exercise on a regular basis  She has had no power loss in her leg  REVIEW OF SYSTEMS  Review of Systems   Musculoskeletal:        Pain in the right buttock to the hip and occasional down the right leg to the knee  She has muscle spasms  All other systems reviewed and are negative  The patient's ROS was reviewed by MD   I reviewed the ROS      Active Ambulatory Problems     Diagnosis Date Noted    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    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 02/06/2018    Saucedo's esophagus without dysplasia 03/05/2018    Hepatic steatosis 03/05/2018    Irritable bowel syndrome (IBS) 07/20/2017    Hyperlipemia 10/06/2017    Generalized anxiety disorder 12/04/2013    Esophageal reflux 01/06/2017    Elevated ALT measurement 04/04/2017    Diverticulosis 09/21/2017    Bilateral ovarian cysts 08/07/2017    Allergic rhinitis 02/23/2016     Resolved Ambulatory Problems     Diagnosis Date Noted    Inflamed sebaceous cyst 08/30/2017     Past Medical History:   Diagnosis Date    Abdominal pain     Anxiety     Back pain     Back pain     Saucedo's esophagus     Bilateral ovarian cysts     Cyst of buttocks     Diverticulosis     GERD (gastroesophageal reflux disease)     Hepatic steatosis     Hyperlipidemia     IBS (irritable bowel syndrome)     IBS (irritable bowel syndrome)     Liver disease     Malignant hyperthermia due to anesthesia     Neuroma     Pink eye disease of both eyes     Seasonal allergies     Squamous cell carcinoma        Past Surgical History:   Procedure Laterality Date    COLONOSCOPY      ESOPHAGOGASTRODUODENOSCOPY      LIPOMA RESECTION N/A 8/30/2017    Procedure: EXCISION GLUTEAL MASS;  Surgeon: Rian Suarez MD;  [facility];  Service: General    MOHS SURGERY Left     neck    NERVE REPAIR Right 11/17/2017    Procedure: 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 (FROZEN SECTION); Surgeon: Alicia Sy MD;  [facility];  Service: Neurosurgery    [place] COLONOSCOPY FLX DX W/[facility] REHABILITATION WHEN PFRMD N/A 6/2/2017    Procedure: EGD AND COLONOSCOPY;  Surgeon: Reed Haro MD;  [facility]; Service: Gastroenterology       History   Smoking Status    Never Smoker   Smokeless Tobacco    Never Used       History   Alcohol Use    Yes     Comment: 5 monthly       History   Drug Use No       There were no vitals filed for this visit        Current Outpatient Prescriptions:     acetaminophen (TYLENOL) 500 mg tablet, Take 500 mg by mouth every 6 (six) hours as needed for mild pain, Disp: , Rfl:     ALPRAZolam (XANAX) 0 5 mg tablet, TAKE 1 TABLET BY MOUTH EVERY 4 HOURS AS NEEDED FOR ANXIETY, Disp: 45 tablet, Rfl: 1    cetirizine (ZyrTEC) 10 mg tablet, Take 10 mg by mouth daily, Disp: , Rfl:     esomeprazole (NexIUM) 40 MG capsule, TAKE ONE CAPSULE BY MOUTH EVERY DAY, Disp: 30 capsule, Rfl: 6    mometasone (NASONEX) 50 mcg/act nasal spray, 2 sprays into each nostril as needed (allergic rhinitis), Disp: 17 g, Rfl: 0    The following portions of the patient's history were reviewed by MD and updated by MA as appropriate: allergies, current medications, past family history, past medical history, past social history, past surgical history and problem list       Physical Exam  Awake alert oriented  Her power is 5/5 bilaterally in the lower extremities  She can flex at the LS spine to 90° without difficulty  She can extend to 30° and her LS spine without difficulty or eliciting pain  She can pivot her spine at the LS region by 30° in each direction  She can perform squat thrust without difficulty  RESULTS/DATA  MRI of the lumbosacral plexus before and after resection demonstrate complete resection of the mass on the current study  In addition to this there is specific mention of ovarian an adnexal mass which is unusual for her  She tells me that she has had ultrasounds in the past   I gave her a copy of this result    She problems that she would follow up with her gynecologist

## 2018-08-21 NOTE — LETTER
August 21, 2018     Yas Wharton MD  [address] [facility] [address]    Patient: Bharti Isaacs   YOB: 1965   Date of Visit: 8/21/2018       Dear Dr Tolu Sweeney: Thank you for referring Alyssa Rosales to me for evaluation  Below are my notes for this consultation  If you have questions, please do not hesitate to call me  I look forward to following your patient along with you  Sincerely,        Jia Knox MD        CC: No Recipients  Jia Knox MD  8/21/2018  3:48 PM  Sign at close encounter  Neurosurgery Office Note  Bharti Isaacs is a 46 y o  female    Type of Visit: No chief complaint on file  ASSESSMENT / PLAN  There are no diagnoses linked to this encounter  DISCUSSION SUMMARY  Patient is status post a 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  We are following this closely for recurrence  Current studies demonstrate no recurrence in no visual tumor  He is doing fairly well with occasional paresthesias of her leg  Her preoperative symptoms are now all gone  I recommended that she continue the principles of physical therapy as well as local massage to the incision region  CHIEF COMPLAINT  Patient presents for 6 month f/u regarding resection of a large intramuscular myxoma 11/17/2017  Diagnoses: Myxoma of right thigh    SX INFO  11/17/17 - 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 (FROZEN SECTION) (Right Buttocks)  Final Path Dx: soft tissue pathology service, Dr Navya Ying, and we would regard this as a intramuscular myxoma  Tumor cells proved negative for S-100 protein, EMA and calretinin expression with only patchy GLUT1 labeling that does not carry diagnostic specificity  This does not represent a myxoid schwannoma, neurofibroma or the entity known as nerve sheath myxoma  Taylor Garrison MD    HISTORY OF PRESENT ILLNESS  She describes a fluttering feeling in her leg at night which is likely local muscle spasms  (Fibrillation)  There is no return of the mass  She has had no burning pain  She does not exercise on a regular basis  She has had no power loss in her leg  REVIEW OF SYSTEMS  Review of Systems   Musculoskeletal:        Pain in the right buttock to the hip and occasional down the right leg to the knee  She has muscle spasms  All other systems reviewed and are negative  The patient's ROS was reviewed by MD ISAACS reviewed the ROS      Active Ambulatory Problems     Diagnosis Date Noted    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    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 02/06/2018    Saucedo's esophagus without dysplasia 03/05/2018    Hepatic steatosis 03/05/2018    Irritable bowel syndrome (IBS) 07/20/2017    Hyperlipemia 10/06/2017    Generalized anxiety disorder 12/04/2013    Esophageal reflux 01/06/2017    Elevated ALT measurement 04/04/2017    Diverticulosis 09/21/2017    Bilateral ovarian cysts 08/07/2017    Allergic rhinitis 02/23/2016     Resolved Ambulatory Problems     Diagnosis Date Noted    Inflamed sebaceous cyst 08/30/2017     Past Medical History:   Diagnosis Date    Abdominal pain     Anxiety     Back pain     Back pain     Saucedo's esophagus     Bilateral ovarian cysts     Cyst of buttocks     Diverticulosis     GERD (gastroesophageal reflux disease)     Hepatic steatosis     Hyperlipidemia     IBS (irritable bowel syndrome)     IBS (irritable bowel syndrome)     Liver disease     Malignant hyperthermia due to anesthesia     Neuroma     Pink eye disease of both eyes     Seasonal allergies     Squamous cell carcinoma        Past Surgical History:   Procedure Laterality Date    COLONOSCOPY      ESOPHAGOGASTRODUODENOSCOPY      LIPOMA RESECTION N/A 8/30/2017    Procedure: EXCISION GLUTEAL MASS;  Surgeon: Jeferson Carroll MD;  [facility];  Service: General    MOHS SURGERY Left     neck    NERVE REPAIR Right 11/17/2017 Procedure: 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 (FROZEN SECTION); Surgeon: Bea Wallace MD;  [facility];  Service: Neurosurgery    OK COLONOSCOPY FLX DX W/[facility] WHEN PFRMD N/A 6/2/2017    Procedure: EGD AND COLONOSCOPY;  Surgeon: Erika Haywood MD;  [facility]; Service: Gastroenterology       History   Smoking Status    Never Smoker   Smokeless Tobacco    Never Used       History   Alcohol Use    Yes     Comment: 5 monthly       History   Drug Use No       There were no vitals filed for this visit  Current Outpatient Prescriptions:     acetaminophen (TYLENOL) 500 mg tablet, Take 500 mg by mouth every 6 (six) hours as needed for mild pain, Disp: , Rfl:     ALPRAZolam (XANAX) 0 5 mg tablet, TAKE 1 TABLET BY MOUTH EVERY 4 HOURS AS NEEDED FOR ANXIETY, Disp: 45 tablet, Rfl: 1    cetirizine (ZyrTEC) 10 mg tablet, Take 10 mg by mouth daily, Disp: , Rfl:     esomeprazole (NexIUM) 40 MG capsule, TAKE ONE CAPSULE BY MOUTH EVERY DAY, Disp: 30 capsule, Rfl: 6    mometasone (NASONEX) 50 mcg/act nasal spray, 2 sprays into each nostril as needed (allergic rhinitis), Disp: 17 g, Rfl: 0    The following portions of the patient's history were reviewed by MD and updated by MA as appropriate: allergies, current medications, past family history, past medical history, past social history, past surgical history and problem list       Physical Exam  Awake alert oriented  Her power is 5/5 bilaterally in the lower extremities  She can flex at the LS spine to 90° without difficulty  She can extend to 30° and her LS spine without difficulty or eliciting pain  She can pivot her spine at the LS region by 30° in each direction  She can perform squat thrust without difficulty  RESULTS/DATA  MRI of the lumbosacral plexus before and after resection demonstrate complete resection of the mass on the current study    In addition to this there is specific mention of ovarian an adnexal mass which is unusual for her  She tells me that she has had ultrasounds in the past   I gave her a copy of this result    She problems that she would follow up with her gynecologist

## 2018-10-02 ENCOUNTER — TRANSCRIBE ORDERS (OUTPATIENT)
Dept: ADMINISTRATIVE | Facility: HOSPITAL | Age: 53
End: 2018-10-02

## 2018-10-02 DIAGNOSIS — Z12.39 SCREENING BREAST EXAMINATION: Primary | ICD-10-CM

## 2018-10-03 ENCOUNTER — HOSPITAL ENCOUNTER (OUTPATIENT)
Dept: MAMMOGRAPHY | Facility: CLINIC | Age: 53
Discharge: HOME | End: 2018-10-03
Payer: COMMERCIAL

## 2018-10-03 DIAGNOSIS — Z12.39 SCREENING BREAST EXAMINATION: ICD-10-CM

## 2018-10-03 PROCEDURE — 77067 SCR MAMMO BI INCL CAD: CPT

## 2019-03-07 DIAGNOSIS — K21.9 CHRONIC GERD: ICD-10-CM

## 2019-03-08 RX ORDER — ESOMEPRAZOLE MAGNESIUM 40 MG/1
CAPSULE, DELAYED RELEASE ORAL
Qty: 30 CAPSULE | Refills: 6 | Status: SHIPPED | OUTPATIENT
Start: 2019-03-08 | End: 2019-10-02 | Stop reason: SDUPTHER

## 2019-03-25 ENCOUNTER — OFFICE VISIT (OUTPATIENT)
Dept: FAMILY MEDICINE CLINIC | Facility: CLINIC | Age: 54
End: 2019-03-25
Payer: COMMERCIAL

## 2019-03-25 VITALS
TEMPERATURE: 98.4 F | DIASTOLIC BLOOD PRESSURE: 78 MMHG | SYSTOLIC BLOOD PRESSURE: 138 MMHG | RESPIRATION RATE: 20 BRPM | HEART RATE: 80 BPM

## 2019-03-25 DIAGNOSIS — Z28.21 VACCINATION REFUSED BY PATIENT: ICD-10-CM

## 2019-03-25 DIAGNOSIS — J06.9 ACUTE URI: Primary | ICD-10-CM

## 2019-03-25 DIAGNOSIS — J98.01 BRONCHOSPASM, ACUTE: ICD-10-CM

## 2019-03-25 PROCEDURE — 99213 OFFICE O/P EST LOW 20 MIN: CPT | Performed by: FAMILY MEDICINE

## 2019-03-25 RX ORDER — AZITHROMYCIN 250 MG/1
TABLET, FILM COATED ORAL
Qty: 6 TABLET | Refills: 0 | Status: SHIPPED | OUTPATIENT
Start: 2019-03-25 | End: 2019-03-29

## 2019-03-25 RX ORDER — ALBUTEROL SULFATE 90 UG/1
2 AEROSOL, METERED RESPIRATORY (INHALATION) EVERY 4 HOURS PRN
Qty: 1 INHALER | Refills: 1 | Status: SHIPPED | OUTPATIENT
Start: 2019-03-25 | End: 2019-03-31 | Stop reason: CLARIF

## 2019-03-25 RX ORDER — METHYLPREDNISOLONE 4 MG/1
TABLET ORAL
Qty: 21 EACH | Refills: 0 | Status: SHIPPED | OUTPATIENT
Start: 2019-03-25 | End: 2020-01-06 | Stop reason: ALTCHOICE

## 2019-03-25 NOTE — PROGRESS NOTES
FAMILY PRACTICE OFFICE VISIT       NAME: Suhas Hendrickson  AGE: 46 y o  SEX: female       : 1965        MRN: [id number]        Assessment and Plan     Problem List Items Addressed This Visit     None      Visit Diagnoses     Acute URI    -  Primary    Relevant Medications    azithromycin (ZITHROMAX) 250 mg tablet    Bronchospasm, acute        Relevant Medications    methylPREDNISolone 4 MG tablet therapy pack    albuterol (PROVENTIL HFA,VENTOLIN HFA) 90 mcg/act inhaler    Vaccination refused by patient        Relevant Orders    PREFERRED: influenza vaccine, [id number], quadrivalent, recombinant, PF, 0 5 mL, for patients 18 yr+ (FLUBLOK)       Patient presents for evaluation of upper respiratory infection  Symptoms of sinusitis and bronchospasm  I advised her to discontinue over-the-counter decongestants as they are contributing to symptoms of generalized  Patient will use Mucinex DM for dry poorly productive cough  Will treat her with Z-Yong and Medrol Dosepak  She will start albuterol inhaler 2 puffs every 4-6 hours as needed  Advised rest, fluids  Patient will contact me in a few days if her symptoms are not improving significantly  There are no Patient Instructions on file for this visit  [employer] software was used to dictate this note  It may contain errors with dictating incorrect words/spelling  Please contact provider directly with any questions  Chief Complaint     Chief Complaint   Patient presents with    Sore Throat    Nasal Congestion    Cough       History of Present Illness     Patient presents for evaluation of cold symptoms   Cold s/o  X 4 days    Chest tightness, wheezing , sinus pressure, nasal congestion, hoarseness    Used Dimetapp over-the-counter, it caused side effects of general this  Cough is dry, poorly productive, very minimal mucus expectoration      No fever   Feels fatigued    Sore Throat    Associated symptoms include congestion, coughing and headaches (Sinus headaches)  Cough   Associated symptoms include headaches (Sinus headaches), postnasal drip, a sore throat and wheezing  Pertinent negatives include no fever  Review of Systems   Review of Systems   Constitutional: Positive for fatigue  Negative for activity change and fever  HENT: Positive for congestion, postnasal drip, sinus pressure, sore throat and voice change  Respiratory: Positive for cough, chest tightness and wheezing  Cardiovascular: Negative  Gastrointestinal: Negative  Neurological: Positive for headaches (Sinus headaches)  Negative for dizziness and light-headedness  Hematological: Negative  Psychiatric/Behavioral: Negative          Active Problem List     Patient Active Problem List   Diagnosis    Neurofibroma    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    Saucedo's esophagus without dysplasia    Hepatic steatosis    Irritable bowel syndrome (IBS)    Hyperlipemia    Generalized anxiety disorder    Esophageal reflux    Elevated ALT measurement    Diverticulosis    Bilateral ovarian cysts    Allergic rhinitis       Past Medical History:  Past Medical History:   Diagnosis Date    Abdominal pain     Anxiety     Back pain     Back pain     Saucedo's esophagus     Bilateral ovarian cysts     Cyst of buttocks     Diverticulosis     GERD (gastroesophageal reflux disease)     Hepatic steatosis     Hyperlipidemia     IBS (irritable bowel syndrome)     IBS (irritable bowel syndrome)     Liver disease     fatty liver    Malignant hyperthermia due to anesthesia     Neuroma     Pink eye disease of both eyes     Chronic and was recently treated    Seasonal allergies     Squamous cell carcinoma     Neck       Past Surgical History:  Past Surgical History:   Procedure Laterality Date    COLONOSCOPY      ESOPHAGOGASTRODUODENOSCOPY      LIPOMA RESECTION N/A 8/30/2017    Procedure: EXCISION GLUTEAL MASS;  Surgeon: Hermelinda Chauhan MD;  [facility]; Service: General    MOHS SURGERY Left     neck    NERVE REPAIR Right 11/17/2017    Procedure: 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 (FROZEN SECTION); Surgeon: Savanna Mondragon MD;  [facility];  Service: Neurosurgery    [place] COLONOSCOPY FLX DX W/COLLJ [facility] WHEN PFRMD N/A 6/2/2017    Procedure: EGD AND COLONOSCOPY;  Surgeon: Lissett Maher MD;  [facility];   Service: Gastroenterology       Family History:  Family History   Problem Relation Age of Onset    Cancer Father     Colon cancer Father     Colon polyps Mother        Social History:  Social History     Socioeconomic History    Marital status: /Civil Union     Spouse name: Not on file    Number of children: Not on file    Years of education: Not on file    Highest education level: Not on file   Occupational History    Not on file   Social Needs    Financial resource strain: Not on file    Food insecurity:     Worry: Not on file     Inability: Not on file    Transportation needs:     Medical: Not on file     Non-medical: Not on file   Tobacco Use    Smoking status: Never Smoker    Smokeless tobacco: Never Used   Substance and Sexual Activity    Alcohol use: Yes     Comment: 5 monthly    Drug use: No    Sexual activity: Not on file   Lifestyle    Physical activity:     Days per week: Not on file     Minutes per session: Not on file    Stress: Not on file   Relationships    Social connections:     Talks on phone: Not on file     Gets together: Not on file     Attends Hinduism service: Not on file     Active member of club or organization: Not on file     Attends meetings of clubs or organizations: Not on file     Relationship status: Not on file    Intimate partner violence:     Fear of current or ex partner: Not on file     Emotionally abused: Not on file     Physically abused: Not on file     Forced sexual activity: Not on file   Other Topics Concern    Not on file   Social History Narrative    Not on file Objective     Vitals:    03/25/19 1435   BP: 138/78   BP Location: Left arm   Patient Position: Sitting   Cuff Size: Adult   Pulse: 80   Resp: 20   Temp: 98 4 °F (36 9 °C)   TempSrc: Tympanic     Wt Readings from Last 3 Encounters:   08/21/18 85 7 kg (189 lb)   03/05/18 85 1 kg (187 lb 9 6 oz)   02/06/18 84 4 kg (186 lb)       Physical Exam   Constitutional: She is oriented to person, place, and time  She appears well-developed and well-nourished  HENT:   Head: Normocephalic and atraumatic  Right Ear: Tympanic membrane normal    Left Ear: Tympanic membrane normal    Nose: Mucosal edema present  Mouth/Throat: Posterior oropharyngeal erythema present  No oropharyngeal exudate ( postnasal drip)  Eyes: Conjunctivae are normal    Neck: Neck supple  Cardiovascular: Normal rate, regular rhythm and normal heart sounds  Pulmonary/Chest: Effort normal and breath sounds normal  No respiratory distress  She has no wheezes  She has no rales  Increased expiratory phase, coarse sounds at bases, good air entry bilaterally   Lymphadenopathy:     She has cervical adenopathy (Submandibular)  Neurological: She is alert and oriented to person, place, and time  She has normal reflexes  No cranial nerve deficit  Psychiatric: She has a normal mood and affect  Her behavior is normal    Nursing note and vitals reviewed        Pertinent Laboratory/Diagnostic Studies:  Lab Results   Component Value Date    BUN 20 10/28/2017    CREATININE 0 76 10/28/2017    CALCIUM 9 9 10/28/2017     10/28/2017    K 4 4 10/28/2017    CO2 25 10/28/2017     10/28/2017     Lab Results   Component Value Date    ALT 39 (H) 10/28/2017    AST 28 10/28/2017    ALKPHOS 72 10/28/2017    BILITOT 0 5 10/28/2017       Lab Results   Component Value Date    WBC 6 2 10/28/2017    HGB 14 1 10/28/2017    HCT 41 8 10/28/2017    MCV 84 1 10/28/2017     10/28/2017       No results found for: TSH    Lab Results   Component Value Date    CHOL 197 10/28/2017     Lab Results   Component Value Date    TRIG 163 (H) 10/28/2017     Lab Results   Component Value Date    HDL 36 (L) 10/28/2017     Lab Results   Component Value Date    LDLCALC 110 (H) 08/08/2014     No results found for: HGBA1C    Results for orders placed or performed during the hospital encounter of 11/17/17   Type and screen   Result Value Ref Range    ABO Grouping A     Rh Factor Negative     Antibody Screen Negative     Specimen Expiration Date [id number]    Tissue Exam   Result Value Ref Range    Case Report       Surgical Pathology Report                         Case: [id number]                                   Authorizing Provider:  Neftaly Harrison MD        Collected:           11/17/2017 0916              Ordering Location:     [address] [place] [address]      Received:            11/17/2017 [address] Operating Room                                                      Pathologist:           Sonia Brooks MD                                                                Specimens:   A) - Mass, Right sacral nerve mass                                                                  B) - Mass, Right sacral nerve mass                                                         Final Diagnosis       A-B  Right sacral nerve mass (resection):     - Intramuscular myxoma  - Case in consultation at [facility]  Note       - [address]    Mass, right sacral nerve mass, [id number] (12 slides):    - Dear Dr Ayaan Aquino,  I have shared this material with a colleague here in our soft tissue pathology service, Dr Hank Crum, and we would regard this as a intramuscular myxoma  Tumor cells proved negative for S-100 protein, EMA and calretinin expression with only patchy GLUT1 labeling that does not carry diagnostic specificity   This does not represent a myxoid schwannoma, neurofibroma or the entity known as nerve sheath myxoma  Adrian Bautista MD    - Interpretation performed at [facility], [address] [place] [address]  Additional Information       All controls performed with the immunohistochemical stains reported above reacted appropriately  These tests were developed and their performance characteristics determined by [facility] or [facility]  They may not be cleared or approved by the [place]  Food and Drug Administration  The FDA has determined that such clearance or approval is not necessary  These tests are used for clinical purposes  They should not be regarded as investigational or for research  This laboratory has been approved by CLIA 88, designated as a high-complexity laboratory and is qualified to perform these tests  Intraoperative Consultation       TP/FS DxA: Spindle and myxoid lesion  Case discussed with surgeon who states the clinical impression favors a neurofibroma with degenerative changes / scarring involving a nerve      - Dr Aminata Perez spoke with Dr Felicita Hope at 9:36 am on 11/17/2017      Gross Description       A  The specimen is received fresh for frozen section, labeled with the patient's name and hospital number, and is designated " Right sacral nerve mass"  The specimen consists of a tan yellow soft tissue fragment measuring 0 9 x 0 5 x 0 3 cm  Entirely submitted for frozen section and histological examination in one cassette  B  The specimen is received in formalin, labeled with the patient's name and hospital number, and is designated " Right sacral nerve mass"  The specimen consists of multiple tan pink mucinous, rubbery tissue fragments measuring in loose aggregate 11 0 x 7 0 x 4 5 cm  The fragments are serially sectioned revealing tan-white mucinous cut surfaces  Representative sections are submitted in 11 cassettes        Note: The estimated total formalin fixation time based upon information provided by the submitting clinician and the standard processing schedule is less than 72 hours  AKemmerer        Clinical Information       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, unspecified   Fingerstick Glucose (POCT)   Result Value Ref Range    POC Glucose 125 65 - 140 mg/dl       Orders Placed This Encounter   Procedures    PREFERRED: influenza vaccine, [id number], quadrivalent, recombinant, PF, 0 5 mL, for patients 18 yr+ (FLUBLOK)       ALLERGIES:  Allergies   Allergen Reactions    Pollen Extract     Sulfa Antibiotics Rash       Current Medications     Current Outpatient Medications   Medication Sig Dispense Refill    acetaminophen (TYLENOL) 500 mg tablet Take 500 mg by mouth every 6 (six) hours as needed for mild pain      albuterol (PROVENTIL HFA,VENTOLIN HFA) 90 mcg/act inhaler Inhale 2 puffs every 4 (four) hours as needed for wheezing or shortness of breath (cough) 1 Inhaler 1    ALPRAZolam (XANAX) 0 5 mg tablet TAKE 1 TABLET BY MOUTH EVERY 4 HOURS AS NEEDED FOR ANXIETY 45 tablet 1    azithromycin (ZITHROMAX) 250 mg tablet Take 2 tablets today then 1 tablet daily x 4 days 6 tablet 0    cetirizine (ZyrTEC) 10 mg tablet Take 10 mg by mouth daily      esomeprazole (NexIUM) 40 MG capsule TAKE ONE CAPSULE BY MOUTH EVERY DAY 30 capsule 6    methylPREDNISolone 4 MG tablet therapy pack Use as directed on package 21 each 0     No current facility-administered medications for this visit            Health Maintenance     Health Maintenance   Topic Date Due    BMI: Followup Plan  07/26/1982    Pneumococcal PPSV23 Highest Risk Adult (1 of 3 - PCV13) 07/26/1983    DTaP,Tdap,and Td Vaccines (1 - Tdap) 07/26/1985    PT PLAN OF CARE  04/27/2018    Hepatitis C Screening  04/25/2019 (Originally 1965)    Depression Screening PHQ  04/25/2019 (Originally 2/14/2019)    INFLUENZA VACCINE  03/25/2020 (Originally 7/1/2018)    BMI: Adult  08/21/2019    MAMMOGRAM  10/03/2019    CRC Screening: Colonoscopy 06/02/2022    HEPATITIS B VACCINES  Aged Out     Immunization History   Administered Date(s) Administered    Influenza Quadrivalent Preservative Free 3 years and older IM 11/07/2014       Marshall Quinteros MD

## 2019-03-31 ENCOUNTER — TELEPHONE (OUTPATIENT)
Dept: FAMILY MEDICINE CLINIC | Facility: CLINIC | Age: 54
End: 2019-03-31

## 2019-03-31 DIAGNOSIS — J06.9 ACUTE URI: Primary | ICD-10-CM

## 2019-03-31 RX ORDER — LEVALBUTEROL TARTRATE 45 UG/1
2 AEROSOL, METERED ORAL EVERY 4 HOURS PRN
Qty: 1 INHALER | Refills: 1 | Status: SHIPPED | OUTPATIENT
Start: 2019-03-31 | End: 2020-01-06 | Stop reason: ALTCHOICE

## 2019-03-31 RX ORDER — BENZONATATE 100 MG/1
100 CAPSULE ORAL 3 TIMES DAILY PRN
Qty: 30 CAPSULE | Refills: 0 | Status: SHIPPED | OUTPATIENT
Start: 2019-03-31 | End: 2020-01-06 | Stop reason: ALTCHOICE

## 2019-03-31 RX ORDER — CEFUROXIME AXETIL 500 MG/1
500 TABLET ORAL 2 TIMES DAILY
Qty: 14 TABLET | Refills: 0 | Status: SHIPPED | OUTPATIENT
Start: 2019-03-31 | End: 2019-04-07

## 2019-03-31 NOTE — TELEPHONE ENCOUNTER
I spoke with patient  She is complaining of persistent symptoms of cold and sinus infection  Her left ear is clock, she feels congested, ongoing green nasal secretions and cough  Patient is still experiencing symptoms of chest tightness  She is currently using Mucinex  Will start her on Ceftin  Will stop Mucinex and try Tessalon Perles for cough  Patient will start levalbuterol inhaler    I will recheck her progress tomorrow  Patient understands instructions and agrees

## 2019-04-01 DIAGNOSIS — J98.01 BRONCHOSPASM: Primary | ICD-10-CM

## 2019-04-01 RX ORDER — FLUTICASONE FUROATE AND VILANTEROL 200; 25 UG/1; UG/1
1 POWDER RESPIRATORY (INHALATION) DAILY
Qty: 1 INHALER | Refills: 1 | Status: SHIPPED | OUTPATIENT
Start: 2019-04-01 | End: 2020-01-06 | Stop reason: ALTCHOICE

## 2019-06-17 ENCOUNTER — OFFICE VISIT (OUTPATIENT)
Dept: GASTROENTEROLOGY | Facility: CLINIC | Age: 54
End: 2019-06-17
Payer: COMMERCIAL

## 2019-06-17 VITALS
WEIGHT: 192.8 LBS | SYSTOLIC BLOOD PRESSURE: 115 MMHG | HEART RATE: 78 BPM | HEIGHT: 63 IN | TEMPERATURE: 99.6 F | BODY MASS INDEX: 34.16 KG/M2 | DIASTOLIC BLOOD PRESSURE: 81 MMHG

## 2019-06-17 DIAGNOSIS — K76.0 HEPATIC STEATOSIS: ICD-10-CM

## 2019-06-17 DIAGNOSIS — K22.70 BARRETT'S ESOPHAGUS WITHOUT DYSPLASIA: Primary | ICD-10-CM

## 2019-06-17 PROCEDURE — 99213 OFFICE O/P EST LOW 20 MIN: CPT | Performed by: PHYSICIAN ASSISTANT

## 2019-07-10 ENCOUNTER — ANESTHESIA EVENT (OUTPATIENT)
Dept: GASTROENTEROLOGY | Facility: MEDICAL CENTER | Age: 54
End: 2019-07-10

## 2019-08-06 ENCOUNTER — HOSPITAL ENCOUNTER (OUTPATIENT)
Dept: GASTROENTEROLOGY | Facility: MEDICAL CENTER | Age: 54
Setting detail: OUTPATIENT SURGERY
Discharge: HOME | End: 2019-08-06
Attending: INTERNAL MEDICINE | Admitting: INTERNAL MEDICINE
Payer: COMMERCIAL

## 2019-08-06 ENCOUNTER — ANESTHESIA (OUTPATIENT)
Dept: GASTROENTEROLOGY | Facility: MEDICAL CENTER | Age: 54
End: 2019-08-06

## 2019-08-06 VITALS
SYSTOLIC BLOOD PRESSURE: 110 MMHG | HEIGHT: 63 IN | TEMPERATURE: 98.5 F | RESPIRATION RATE: 18 BRPM | OXYGEN SATURATION: 93 % | WEIGHT: 190 LBS | HEART RATE: 61 BPM | DIASTOLIC BLOOD PRESSURE: 67 MMHG | BODY MASS INDEX: 33.66 KG/M2

## 2019-08-06 DIAGNOSIS — K22.70 BARRETT'S ESOPHAGUS WITHOUT DYSPLASIA: ICD-10-CM

## 2019-08-06 PROCEDURE — 43239 EGD BIOPSY SINGLE/MULTIPLE: CPT | Performed by: INTERNAL MEDICINE

## 2019-08-06 PROCEDURE — 88305 TISSUE EXAM BY PATHOLOGIST: CPT | Performed by: PATHOLOGY

## 2019-08-06 RX ORDER — LIDOCAINE HYDROCHLORIDE 20 MG/ML
INJECTION, SOLUTION EPIDURAL; INFILTRATION; INTRACAUDAL; PERINEURAL AS NEEDED
Status: DISCONTINUED | OUTPATIENT
Start: 2019-08-06 | End: 2019-08-06 | Stop reason: SURG

## 2019-08-06 RX ORDER — SODIUM CHLORIDE 9 MG/ML
125 INJECTION, SOLUTION INTRAVENOUS CONTINUOUS
Status: DISCONTINUED | OUTPATIENT
Start: 2019-08-06 | End: 2019-08-06

## 2019-08-06 RX ORDER — ONDANSETRON 2 MG/ML
INJECTION INTRAMUSCULAR; INTRAVENOUS AS NEEDED
Status: DISCONTINUED | OUTPATIENT
Start: 2019-08-06 | End: 2019-08-06 | Stop reason: SURG

## 2019-08-06 RX ORDER — PROPOFOL 10 MG/ML
INJECTION, EMULSION INTRAVENOUS AS NEEDED
Status: DISCONTINUED | OUTPATIENT
Start: 2019-08-06 | End: 2019-08-06 | Stop reason: SURG

## 2019-08-06 RX ORDER — LANOLIN ALCOHOL/MO/W.PET/CERES
6 CREAM (GRAM) TOPICAL
COMMUNITY
End: 2021-04-16 | Stop reason: ALTCHOICE

## 2019-08-06 RX ADMIN — LIDOCAINE HYDROCHLORIDE 3 ML: 20 INJECTION, SOLUTION EPIDURAL; INFILTRATION; INTRACAUDAL; PERINEURAL at 10:22

## 2019-08-06 RX ADMIN — PROPOFOL 20 MG: 10 INJECTION, EMULSION INTRAVENOUS at 10:28

## 2019-08-06 RX ADMIN — SODIUM CHLORIDE 125 ML/HR: 0.9 INJECTION, SOLUTION INTRAVENOUS at 09:47

## 2019-08-06 RX ADMIN — PROPOFOL 140 MG: 10 INJECTION, EMULSION INTRAVENOUS at 10:22

## 2019-08-06 RX ADMIN — PROPOFOL 20 MG: 10 INJECTION, EMULSION INTRAVENOUS at 10:24

## 2019-08-06 RX ADMIN — PROPOFOL 20 MG: 10 INJECTION, EMULSION INTRAVENOUS at 10:26

## 2019-08-06 RX ADMIN — ONDANSETRON 4 MG: 2 INJECTION INTRAMUSCULAR; INTRAVENOUS at 10:22

## 2019-08-06 NOTE — H&P
History and Physical - [facility]  Emelia Lema 53 y o  female MRN: [id number]    HPI: Emelia Lema is a 53y o  year old female who presents with history of Saucedo's esophagus last endoscopy in 2017  Review of Systems    Historical Information   Past Medical History:   Diagnosis Date    Abdominal pain     Anxiety     Back pain     Back pain     Saucedo's esophagus     Bilateral ovarian cysts     Cyst of buttocks     Diverticulosis     GERD (gastroesophageal reflux disease)     Hepatic steatosis     Hyperlipidemia     IBS (irritable bowel syndrome)     IBS (irritable bowel syndrome)     Liver disease     fatty liver    Neuroma     Pink eye disease of both eyes     Chronic and was recently treated    Seasonal allergies     Squamous cell carcinoma     Neck     Past Surgical History:   Procedure Laterality Date    COLONOSCOPY      ESOPHAGOGASTRODUODENOSCOPY      LIPOMA RESECTION N/A 8/30/2017    Procedure: EXCISION GLUTEAL MASS;  Surgeon: Liset Rosario MD;  [facility];  Service: General    MOHS SURGERY Left     neck    NERVE REPAIR Right 11/17/2017    Procedure: 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 (FROZEN SECTION); Surgeon: Karon Soto MD;  [facility];  Service: Neurosurgery    [place] COLONOSCOPY FLX DX W/[facility] WHEN PFRMD N/A 6/2/2017    Procedure: EGD AND COLONOSCOPY;  Surgeon: Guillermo Laurent MD;  [facility];   Service: Gastroenterology     Social History   Social History     Substance and Sexual Activity   Alcohol Use Yes    Comment: 5 monthly     Social History     Substance and Sexual Activity   Drug Use No     Social History     Tobacco Use   Smoking Status Never Smoker   Smokeless Tobacco Never Used     Family History   Problem Relation Age of Onset    Cancer Father     Colon cancer Father     Colon polyps Mother        Meds/Allergies       (Not in a hospital admission)    Allergies   Allergen Reactions    Pollen Extract     Sulfa Antibiotics Rash       Objective     /85   Pulse 70   Temp 98 5 °F (36 9 °C) (Temporal)   Resp 16   Ht 5' 3" (1 6 m)   Wt 86 2 kg (190 lb)   SpO2 96%   BMI 33 66 kg/m²       PHYSICAL EXAM    Gen: NAD  CV: RRR  CHEST: Clear  ABD: soft, NT/ND  EXT: no edema  Neuro: AAO      ASSESSMENT/PLAN:  This is a 53y o  year old female here for follow-up of Saucedo's esophagus      PLAN:   Procedure:  EGD

## 2019-08-06 NOTE — ANESTHESIA PREPROCEDURE EVALUATION
Review of Systems/Medical History  Patient summary reviewed  Chart reviewed  History of anesthetic complications (patient denies, patient did receive full general anesthesia at [facility] [address] 8/2017 without any complications  patient denies any family history)     Cardiovascular  Hyperlipidemia,    Pulmonary  Negative pulmonary ROS        GI/Hepatic    GERD well controlled, Liver disease (fatty liver) ,        Negative  ROS        Endo/Other  Negative endo/other ROS      GYN       Hematology  Negative hematology ROS      Musculoskeletal    Comment: Right leg pain/numbness down back right leg      Neurology    Paresthesias,    Psychology   Anxiety,              Physical Exam    Airway    Mallampati score: II  TM Distance: <3 FB  Neck ROM: full     Dental   No notable dental hx     Cardiovascular  Rhythm: regular, Rate: normal, Cardiovascular exam normal    Pulmonary  Pulmonary exam normal Breath sounds clear to auscultation,     Other Findings  Teeth intact      Anesthesia Plan  ASA Score- 2     Anesthesia Type- IV sedation with anesthesia with ASA Monitors  Additional Monitors:   Airway Plan:         Plan Factors- Patient instructed to abstain from smoking on day of procedure  Patient did not smoke on day of surgery  Induction- intravenous  Postoperative Plan-     Informed Consent- Anesthetic plan and risks discussed with patient

## 2019-08-06 NOTE — DISCHARGE INSTRUCTIONS
Upper Endoscopy   WHAT YOU NEED TO KNOW:   An upper endoscopy is also called an upper gastrointestinal (GI) endoscopy, or an esophagogastroduodenoscopy (EGD)  You may feel bloated, gassy, or have some abdominal discomfort after your procedure  Your throat may be sore for 24 to 36 hours  You may burp or pass gas from air that is still inside your body  DISCHARGE INSTRUCTIONS:   Call 911 if:   · You have sudden chest pain or trouble breathing  Seek care immediately if:   · You feel dizzy or faint  · You have trouble swallowing  · You have severe throat pain  · Your bowel movements are very dark or black  · Your abdomen is hard and firm and you have severe pain  · You vomit blood  Contact your healthcare provider if:   · You feel full or bloated and cannot burp or pass gas  · You have not had a bowel movement for 3 days after your procedure  · You have neck pain  · You have a fever or chills  · You have nausea or are vomiting  · You have a rash or hives  · You have questions or concerns about your endoscopy  Relieve a sore throat:  Suck on throat lozenges or crushed ice  Gargle with a small amount of warm salt water  Mix 1 teaspoon of salt and 1 cup of warm water to make salt water  Relieve gas and discomfort from bloating:  Lie on your right side with a heating pad on your abdomen  Take short walks to help pass gas  Eat small meals until bloating is relieved  Rest after your procedure:  Do not drive or make important decisions until the day after your procedure  Return to your normal activity as directed  You can usually return to work the day after your procedure  Follow up with your healthcare provider as directed:  Write down your questions so you remember to ask them during your visits  © 2017 [employer] Information is for End User's use only and may not be sold, redistributed or otherwise used for commercial purposes   All illustrations and images included in CareNotes® are the copyrighted property of A D A M , Inc  or [employer]  The above information is an  only  It is not intended as medical advice for individual conditions or treatments  Talk to your doctor, nurse or pharmacist before following any medical regimen to see if it is safe and effective for you  Saucedo Esophagus   WHAT YOU NEED TO KNOW:   Saucedo esophagus is a condition in which the cells that line your esophagus are damaged  The damage can cause abnormal changes in the cells  These abnormal changes increase your risk of esophageal cancer  DISCHARGE INSTRUCTIONS:   Medicines:   · Anti-reflux medicines  may be needed to help decrease the stomach acid that can irritate your esophagus and stomach  These medicines may include proton pump inhibitors (PPI) and histamine type-2 receptor (H2) blockers  You may also be given medicines to stop vomiting  · Take your medicine as directed  Contact your healthcare provider if you think your medicine is not helping or if you have side effects  Tell him if you are allergic to any medicine  Keep a list of the medicines, vitamins, and herbs you take  Include the amounts, and when and why you take them  Bring the list or the pill bottles to follow-up visits  Carry your medicine list with you in case of an emergency  Follow up with your healthcare provider as directed: Your healthcare provider may need to repeat your endoscopy and biopsy  These tests help look for early signs of esophageal cancer  Write down your questions so you remember to ask them during your visits  Nutrition:  Do not eat foods that make your symptoms worse  Examples are chocolate, garlic, onions, spicy or fatty foods, citrus fruits (oranges), and tomato-based foods (spaghetti sauce)  Do not drink alcohol, drinks that contain caffeine, or carbonated drinks, such as soda   Ask your healthcare provider if there are other foods and drinks you should not have  Maintain a healthy weight: If you are overweight, weight loss may help relieve symptoms  Ask your healthcare provider about safe ways to lose weight  Do not smoke: If you smoke, it is never too late to quit  Smoking may worsen acid reflux  Ask your healthcare provider for information if you need help quitting  Contact your healthcare provider if:   · Your symptoms do not improve with treatment  · You have questions or concerns about your condition or care  Seek care immediately or call 911 if:   · You have severe chest pain and shortness of breath  · Your bowel movements are black, bloody, or tarry  · Your vomit looks like coffee grounds or has blood in it  © 2017 [facility] Information is for End User's use only and may not be sold, redistributed or otherwise used for commercial purposes  All illustrations and images included in CareNotes® are the copyrighted property of [employer]  or Chetan Santa  The above information is an  only  It is not intended as medical advice for individual conditions or treatments  Talk to your doctor, nurse or pharmacist before following any medical regimen to see if it is safe and effective for you

## 2019-08-06 NOTE — ANESTHESIA POSTPROCEDURE EVALUATION
Post-Op Assessment Note    CV Status:  Stable  Pain Score: 0    Pain management: adequate     Mental Status:  Alert and awake   Hydration Status:  Euvolemic   PONV Controlled:  Controlled   Airway Patency:  Patent   Post Op Vitals Reviewed: Yes      Staff: Anesthesiologist           /67 (08/06/19 1049)    Temp      Pulse 61 (08/06/19 1049)   Resp 18 (08/06/19 1049)    SpO2 93 % (08/06/19 1049)

## 2019-08-07 ENCOUNTER — TELEPHONE (OUTPATIENT)
Dept: GASTROENTEROLOGY | Facility: AMBULARY SURGERY CENTER | Age: 54
End: 2019-08-07

## 2019-10-01 ENCOUNTER — TELEPHONE (OUTPATIENT)
Dept: FAMILY MEDICINE CLINIC | Facility: CLINIC | Age: 54
End: 2019-10-01

## 2019-10-01 DIAGNOSIS — F41.1 GAD (GENERALIZED ANXIETY DISORDER): ICD-10-CM

## 2019-10-01 RX ORDER — ALPRAZOLAM 0.5 MG/1
0.5 TABLET ORAL 2 TIMES DAILY PRN
Qty: 30 TABLET | Refills: 0 | Status: SHIPPED | OUTPATIENT
Start: 2019-10-01

## 2019-10-01 NOTE — TELEPHONE ENCOUNTER
Pt called requesting something for stress and anxiety due to having to place her father in a facility for a dementia diagnosis  She is requesting a refill for prn Xanax

## 2019-10-02 DIAGNOSIS — K21.9 CHRONIC GERD: ICD-10-CM

## 2019-10-02 RX ORDER — ESOMEPRAZOLE MAGNESIUM 40 MG/1
CAPSULE, DELAYED RELEASE ORAL
Qty: 30 CAPSULE | Refills: 6 | Status: SHIPPED | OUTPATIENT
Start: 2019-10-02 | End: 2020-01-06 | Stop reason: ALTCHOICE

## 2020-01-06 ENCOUNTER — OFFICE VISIT (OUTPATIENT)
Dept: FAMILY MEDICINE CLINIC | Facility: CLINIC | Age: 55
End: 2020-01-06
Payer: COMMERCIAL

## 2020-01-06 VITALS
TEMPERATURE: 97 F | HEIGHT: 63 IN | DIASTOLIC BLOOD PRESSURE: 76 MMHG | RESPIRATION RATE: 16 BRPM | SYSTOLIC BLOOD PRESSURE: 110 MMHG | HEART RATE: 90 BPM | OXYGEN SATURATION: 95 % | BODY MASS INDEX: 33.66 KG/M2

## 2020-01-06 DIAGNOSIS — J06.9 UPPER RESPIRATORY TRACT INFECTION, UNSPECIFIED TYPE: Primary | ICD-10-CM

## 2020-01-06 PROCEDURE — 99213 OFFICE O/P EST LOW 20 MIN: CPT | Performed by: NURSE PRACTITIONER

## 2020-01-06 RX ORDER — OMEPRAZOLE 40 MG/1
40 CAPSULE, DELAYED RELEASE ORAL DAILY
COMMUNITY
End: 2020-04-29

## 2020-01-06 RX ORDER — AMOXICILLIN 875 MG/1
875 TABLET, COATED ORAL 2 TIMES DAILY
Qty: 14 TABLET | Refills: 0 | Status: SHIPPED | OUTPATIENT
Start: 2020-01-06 | End: 2020-01-13

## 2020-01-06 NOTE — PROGRESS NOTES
FAMILY PRACTICE OFFICE VISIT       NAME: Anmol Wilkerson  AGE: 53 y o  SEX: female       : 1965        MRN: [id number]    DATE: 2020    Assessment and Plan     Problem List Items Addressed This Visit     None      Visit Diagnoses     Upper respiratory tract infection, unspecified type    -  Primary    Relevant Medications    amoxicillin (AMOXIL) 875 mg tablet        1  Upper respiratory tract infection, unspecified type  amoxicillin (AMOXIL) 875 mg tablet     This 79-year-old female presents today for symptoms consistent with an upper respiratory tract infection  Recommend treatment with amoxicillin 875 mg twice daily for 7 days  Take this medication with food  Recommend taking over-the-counter probiotic or eating yogurt daily with antibiotics  Continue supportive care:  Rest, fluids, warm tea, honey, humidification  May continue to use Tylenol or ibuprofen as needed  May continue use NyQuil and DayQuil as needed  If symptoms should worsen, if symptoms are not improving over the next 3-4 days, she will call  Chief Complaint     Chief Complaint   Patient presents with    Cold Like Symptoms     Cough, fever, body aches, chills x 3 days       History of Present Illness     Esequiel Pink is a 79-year-old female presenting today for cold symptoms that began 2 days ago, and worsened yesterday  Symptoms include cough, headaches, body aches, voice change  Chest feels tight  No fevers  No chills, but positive for sweats  No nasal congestion or sore throat  Has been using NyQuil, DayQuil, and Advil  Her  has been sick with similar symptoms  Review of Systems   Review of Systems   Constitutional: Positive for diaphoresis and fatigue  Negative for chills and fever  HENT: Negative for congestion, ear pain, postnasal drip, rhinorrhea, sinus pressure and sore throat  Respiratory: Positive for cough and chest tightness  Negative for shortness of breath and wheezing  Cardiovascular: Negative for chest pain, palpitations and leg swelling  Musculoskeletal: Positive for myalgias  Skin: Negative for rash  Neurological: Positive for headaches  Negative for dizziness  Active Problem List     Patient Active Problem List   Diagnosis    Neurofibroma    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    Saucedo's esophagus without dysplasia    Hepatic steatosis    Irritable bowel syndrome (IBS)    Hyperlipemia    Generalized anxiety disorder    Esophageal reflux    Elevated ALT measurement    Diverticulosis    Bilateral ovarian cysts    Allergic rhinitis       Past Medical History:  Past Medical History:   Diagnosis Date    Abdominal pain     Anxiety     Back pain     Back pain     Saucedo's esophagus     Bilateral ovarian cysts     Cyst of buttocks     Diverticulosis     GERD (gastroesophageal reflux disease)     Hepatic steatosis     Hyperlipidemia     IBS (irritable bowel syndrome)     IBS (irritable bowel syndrome)     Liver disease     fatty liver    Neuroma     Pink eye disease of both eyes     Chronic and was recently treated    Seasonal allergies     Squamous cell carcinoma     Neck       Past Surgical History:  Past Surgical History:   Procedure Laterality Date    COLONOSCOPY      ESOPHAGOGASTRODUODENOSCOPY      LIPOMA RESECTION N/A 8/30/2017    Procedure: EXCISION GLUTEAL MASS;  Surgeon: Vamshi Carroll MD;  Location: [facility];  Service: General    MOHS SURGERY Left     neck    NERVE REPAIR Right 11/17/2017    Procedure: 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 (FROZEN SECTION); Surgeon: Contreras Jin MD;  Location: [facility];  Service: Neurosurgery    [facility] N/A 6/2/2017    Procedure: EGD AND COLONOSCOPY;  Surgeon: Mishel Garcia MD;  Location: [facility];   Service: Gastroenterology       Family History:  Family History   Problem Relation Age of Onset    Cancer Father     Colon cancer Father     Colon polyps Mother        Social History:  Social History     Socioeconomic History    Marital status: /Civil Union     Spouse name: Not on file    Number of children: Not on file    Years of education: Not on file    Highest education level: Not on file   Occupational History    Not on file   Social Needs    Financial resource strain: Not on file    Food insecurity:     Worry: Not on file     Inability: Not on file    Transportation needs:     Medical: Not on file     Non-medical: Not on file   Tobacco Use    Smoking status: Never Smoker    Smokeless tobacco: Never Used   Substance and Sexual Activity    Alcohol use: Yes     Comment: 5 monthly    Drug use: No    Sexual activity: Not on file   Lifestyle    Physical activity:     Days per week: Not on file     Minutes per session: Not on file    Stress: Not on file   Relationships    Social connections:     Talks on phone: Not on file     Gets together: Not on file     Attends [employer] service: Not on file     Active member of club or organization: Not on file     Attends meetings of clubs or organizations: Not on file     Relationship status: Not on file    Intimate partner violence:     Fear of current or ex partner: Not on file     Emotionally abused: Not on file     Physically abused: Not on file     Forced sexual activity: Not on file   Other Topics Concern    Not on file   Social History Narrative    Not on file       I have reviewed the patient's medical history in detail; there are no changes to the history as noted in the electronic medical record      Objective     Vitals:    01/06/20 1005   BP: 110/76   BP Location: Left arm   Patient Position: Sitting   Cuff Size: Standard   Pulse: 90   Resp: 16   Temp: (!) 97 °F (36 1 °C)   TempSrc: Tympanic   SpO2: 95%   Height: 5' 3" (1 6 m)     Wt Readings from Last 3 Encounters:   08/06/19 86 2 kg (190 lb)   06/17/19 87 5 kg (192 lb 12 8 oz)   08/21/18 85 7 kg (189 lb) Physical Exam   Constitutional: She appears well-developed and well-nourished  No distress  HENT:   Head: Normocephalic and atraumatic  Right Ear: Tympanic membrane normal    Left Ear: Tympanic membrane normal    Nose: Nose normal    Mouth/Throat: Uvula is midline  Posterior oropharyngeal erythema (mild erythema) present  No oropharyngeal exudate or posterior oropharyngeal edema  Eyes: Conjunctivae are normal    Neck: Normal range of motion  Neck supple  Cardiovascular: Normal rate, regular rhythm and normal heart sounds  Pulmonary/Chest: Effort normal and breath sounds normal  No respiratory distress  Lymphadenopathy:     She has no cervical adenopathy  Psychiatric: She has a normal mood and affect  Nursing note and vitals reviewed  ALLERGIES:  Allergies   Allergen Reactions    Pollen Extract     Sulfa Antibiotics Rash       Current Medications     Current Outpatient Medications   Medication Sig Dispense Refill    acetaminophen (TYLENOL) 500 mg tablet Take 500 mg by mouth every 6 (six) hours as needed for mild pain      ALPRAZolam (XANAX) 0 5 mg tablet Take 1 tablet (0 5 mg total) by mouth 2 (two) times a day as needed for anxiety or sleep 30 tablet 0    cetirizine (ZyrTEC) 10 mg tablet Take 10 mg by mouth daily      melatonin 3 mg Take 6 mg by mouth daily at bedtime      omeprazole (PriLOSEC) 40 MG capsule Take 40 mg by mouth daily      amoxicillin (AMOXIL) 875 mg tablet Take 1 tablet (875 mg total) by mouth 2 (two) times a day for 7 days 14 tablet 0     No current facility-administered medications for this visit            Health Maintenance     Health Maintenance   Topic Date Due    Hepatitis C Screening  1965    DTaP,Tdap,and Td Vaccines (1 - Tdap) 07/26/1975    HIV Screening  07/26/1979    BMI: Followup Plan  07/26/1982    Cervical Cancer Screening  07/26/1985    PT PLAN OF CARE  04/27/2018    MAMMOGRAM  10/03/2019    Influenza Vaccine  03/25/2020 (Originally 7/1/2019)    BMI: Adult  08/06/2020    Depression Screening PHQ  01/06/2021    CRC Screening: Colonoscopy  06/02/2022    Pneumococcal Vaccine: 65+ Years (1 of 2 - PCV13) 07/26/2029    Pneumococcal Vaccine: Pediatrics (0 to 5 Years) and At-Risk Patients (6 to 59 Years)  Aged Out    HIB Vaccine  Aged Out    Hepatitis B Vaccine  Aged Out    IPV Vaccine  Aged Out    Hepatitis A Vaccine  Aged Out    Meningococcal ACWY Vaccine  Aged Out    HPV Vaccine  Aged [employer] History   Administered Date(s) Administered    Influenza Quadrivalent Preservative Free 3 years and older IM 11/07/2014       KENY Sainz

## 2020-01-06 NOTE — LETTER
January 6, 2020     Patient: Francia Forde   YOB: 1965   Date of Visit: 1/6/2020       To Whom it May Concern:    Krisrobyn Blanca is under my professional care  She was seen in my office on 1/6/2020  She may return to work on 01/08/2020  If you have any questions or concerns, please don't hesitate to call           Sincerely,          KENY Akins        CC: No Recipients

## 2020-02-11 ENCOUNTER — OFFICE VISIT (OUTPATIENT)
Dept: FAMILY MEDICINE CLINIC | Facility: CLINIC | Age: 55
End: 2020-02-11
Payer: COMMERCIAL

## 2020-02-11 VITALS
HEART RATE: 84 BPM | TEMPERATURE: 98.1 F | RESPIRATION RATE: 17 BRPM | DIASTOLIC BLOOD PRESSURE: 80 MMHG | SYSTOLIC BLOOD PRESSURE: 122 MMHG | OXYGEN SATURATION: 98 %

## 2020-02-11 DIAGNOSIS — E78.5 HYPERLIPIDEMIA, UNSPECIFIED HYPERLIPIDEMIA TYPE: ICD-10-CM

## 2020-02-11 DIAGNOSIS — G47.09 OTHER INSOMNIA: ICD-10-CM

## 2020-02-11 DIAGNOSIS — K76.0 HEPATIC STEATOSIS: ICD-10-CM

## 2020-02-11 DIAGNOSIS — K22.70 BARRETT'S ESOPHAGUS WITHOUT DYSPLASIA: ICD-10-CM

## 2020-02-11 DIAGNOSIS — K21.00 GASTROESOPHAGEAL REFLUX DISEASE WITH ESOPHAGITIS: ICD-10-CM

## 2020-02-11 DIAGNOSIS — Z00.00 ENCOUNTER FOR HEALTH MAINTENANCE EXAMINATION IN ADULT: Primary | ICD-10-CM

## 2020-02-11 PROCEDURE — 99396 PREV VISIT EST AGE 40-64: CPT | Performed by: FAMILY MEDICINE

## 2020-02-11 RX ORDER — ZALEPLON 10 MG/1
CAPSULE ORAL
Qty: 30 CAPSULE | Refills: 2 | Status: SHIPPED | OUTPATIENT
Start: 2020-02-11 | End: 2020-06-05

## 2020-02-11 RX ORDER — FAMOTIDINE 40 MG/1
40 TABLET, FILM COATED ORAL
Qty: 90 TABLET | Refills: 1 | Status: SHIPPED | OUTPATIENT
Start: 2020-02-11 | End: 2020-09-18

## 2020-02-11 NOTE — PROGRESS NOTES
FAMILY PRACTICE OFFICE VISIT       NAME: Winter Martinez  AGE: 53 y o  SEX: female       : 1965        MRN: [id number]        Assessment and Plan     Problem List Items Addressed This Visit        Digestive    Saucedo's esophagus without dysplasia    Relevant Medications    famotidine (PEPCID) 40 MG tablet    Other Relevant Orders    Comprehensive metabolic panel    Lipid panel    TSH, 3rd generation    CBC and differential    Hepatic steatosis    Esophageal reflux     · Patient is under care of [facility]  · EGD performed in 2019 is consistent with Saucedo's esophagus  ·  Continue omeprazole 40 mg daily in the morning  · Will add Pepcid 40 mg q h s  for breakthrough reflux symptoms  · Importance of weight loss and bland diet emphasized  · Gastroenterology mention possibility ofSTRETTA if reflux symptoms are persisting  Relevant Medications    famotidine (PEPCID) 40 MG tablet       Other    Hyperlipemia     · Follow low-fat low-cholesterol diet, lose weight, start routine exercise         Relevant Orders    Comprehensive metabolic panel    Lipid panel    TSH, 3rd generation    CBC and differential    Other insomnia    Relevant Medications    zaleplon (SONATA) 10 MG capsule      Other Visit Diagnoses     Encounter for health maintenance examination in adult    -  Primary       Patient presents for annual well exam   Assessment and plan as outlined above  She remains under ongoing care of [facility] for treatment of GERD/Saucedo's esophagus  Will continue on omeprazole 40 mg daily and will add Pepcid 40 mg q h s  for possible breakthrough nighttime symptoms  Patient has been experiencing intermittent episodes of nocturnal awakening, I suspect they might be related to silent reflux  Patient is requesting p r n  Sleep aid, will try her on sonata 10 mg q h s  P r n  Sharlene [place] Patient will proceed with complete blood work as outlined above    We discussed importance of weight loss and dietary changes  Patient is up-to-date with gyn exam and colonoscopy  Pending mammogram   Follow up pending labs, updates, annually and as needed  BMI Counseling: There is no height or weight on file to calculate BMI  The BMI is above normal  Nutrition recommendations include decreasing portion sizes, consuming healthier snacks, moderation in carbohydrate intake and reducing intake of cholesterol  Exercise recommendations include exercising 3-5 times per week  No pharmacotherapy was ordered  There are no Patient Instructions on file for this visit  Discussed with the patient and all questioned fully answered  She will call me if any problems arise  [employer] software was used to dictate this note  It may contain errors with dictating incorrect words/spelling  Please contact provider directly with any questions  Chief Complaint     Chief Complaint   Patient presents with    Follow-up     Vertigo    Insomnia       History of Present Illness     Annual well exam   Patient has been feeling generally well  She denies symptoms of chest pain, palpitations, shortness of breath or dizziness  Unfortunately, she has not been exercising lately  Patient reports res weight gain  Melissa Console Recent health updates reviewed with patient  She was evaluated by [facility]'s Neurosurgery, DR Terri Hdz- 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 (D21 9 (ICD-10-CM))  Pending re-evaluation with MRI in 2023  Patient denies symptoms of pain or discomfort    Patient is up-to-date with gyn exam, followed by[facility]"   DR Guido Green    mammogram- pending, scheduled   last mammo 10/2018    DR Jesus  follows  colonscopy and Saucedo's    EGD 8/2019   UTD with colonsocopy  IMPRESSION:  1  1 cm tongue of Saucedo's esophagus at the GE junction  [address] of Saucedo's proximal to this area  Biopsies were done    2  Normal stomach and duodenum        Gastroenterology suggested to consider [address] [place] [address] if reflux symptoms are persisting  Patient reports few episodes of dizziness after recent cold symptoms  No recurrences within past 2 weeks                   Review of Systems   Review of Systems   Constitutional: Negative  HENT: Positive for congestion  Eyes: Negative  Respiratory: Negative  Cardiovascular: Negative  Gastrointestinal: Negative  Intermittent symptoms of reflux   Endocrine: Negative  Genitourinary: Negative  Musculoskeletal: Negative  Skin: Negative  Allergic/Immunologic: Negative  Neurological: Positive for dizziness (Resolved)  Hematological: Negative  Psychiatric/Behavioral: Positive for sleep disturbance         Active Problem List     Patient Active Problem List   Diagnosis    Neurofibroma    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    Saucedo's esophagus without dysplasia    Hepatic steatosis    Irritable bowel syndrome (IBS)    Hyperlipemia    Generalized anxiety disorder    Esophageal reflux    Elevated ALT measurement    Diverticulosis    Bilateral ovarian cysts    Allergic rhinitis    Other insomnia       Past Medical History:  Past Medical History:   Diagnosis Date    Abdominal pain     Anxiety     Back pain     Back pain     Saucedo's esophagus     Bilateral ovarian cysts     Cyst of buttocks     Diverticulosis     GERD (gastroesophageal reflux disease)     Hepatic steatosis     Hyperlipidemia     IBS (irritable bowel syndrome)     IBS (irritable bowel syndrome)     Liver disease     fatty liver    Neuroma     Pink eye disease of both eyes     Chronic and was recently treated    Seasonal allergies     Squamous cell carcinoma     Neck       Past Surgical History:  Past Surgical History:   Procedure Laterality Date    COLONOSCOPY      ESOPHAGOGASTRODUODENOSCOPY      LIPOMA RESECTION N/A 8/30/2017    Procedure: EXCISION GLUTEAL MASS;  Surgeon: Andres Mccormick MD;  [facility];  Service: General    MOHS SURGERY Left     neck    NERVE REPAIR Right 11/17/2017    Procedure: 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 (FROZEN SECTION); Surgeon: Nola Lind MD;  [facility];  Service: Neurosurgery    OK COLONOSCOPY FLX DX W/COLLJ [facility] WHEN PFRMD N/A 6/2/2017    Procedure: EGD AND COLONOSCOPY;  Surgeon: Fay Caceres MD;  [facility];   Service: Gastroenterology       Family History:  Family History   Problem Relation Age of Onset    Cancer Father     Colon cancer Father     Dementia Father     Colon polyps Mother     Thyroid disease Mother        Social History:  Social History     Socioeconomic History    Marital status: /Civil Union     Spouse name: Not on file    Number of children: 2    Years of education: Not on file    Highest education level: Not on file   Occupational History    Not on file   Social Needs    Financial resource strain: Not on file    Food insecurity:     Worry: Not on file     Inability: Not on file    Transportation needs:     Medical: Not on file     Non-medical: Not on file   Tobacco Use    Smoking status: Never Smoker    Smokeless tobacco: Never Used   Substance and Sexual Activity    Alcohol use: Yes     Comment: 5 monthly; socially    Drug use: No    Sexual activity: Not on file   Lifestyle    Physical activity:     Days per week: Not on file     Minutes per session: Not on file    Stress: Not on file   Relationships    Social connections:     Talks on phone: Not on file     Gets together: Not on file     Attends Mosque service: Not on file     Active member of club or organization: Not on file     Attends meetings of clubs or organizations: Not on file     Relationship status: Not on file    Intimate partner violence:     Fear of current or ex partner: Not on file     Emotionally abused: Not on file     Physically abused: Not on file     Forced sexual activity: Not on file   Other Topics Concern    Not on file   Social History Narrative    Lives with spouse           Objective     Vitals:    02/11/20 1815   BP: 122/80   BP Location: Left arm   Patient Position: Sitting   Cuff Size: Adult   Pulse: 84   Resp: 17   Temp: 98 1 °F (36 7 °C)   TempSrc: Tympanic   SpO2: 98%     Wt Readings from Last 3 Encounters:   08/06/19 86 2 kg (190 lb)   06/17/19 87 5 kg (192 lb 12 8 oz)   08/21/18 85 7 kg (189 lb)       Physical Exam   Constitutional: She is oriented to person, place, and time  She appears well-developed and well-nourished  HENT:   Head: Normocephalic and atraumatic  Right Ear: Tympanic membrane and ear canal normal    Left Ear: Tympanic membrane and ear canal normal    Mouth/Throat: Oropharynx is clear and moist    Eyes: Conjunctivae are normal    Neck: Neck supple  Carotid bruit is not present  No thyromegaly present  Cardiovascular: Normal rate, regular rhythm and normal heart sounds  No murmur heard  Pulmonary/Chest: Effort normal and breath sounds normal  No respiratory distress  She has no wheezes  Abdominal: Soft  Bowel sounds are normal  She exhibits no distension and no abdominal bruit  There is no tenderness  There is no guarding  Musculoskeletal: Normal range of motion  She exhibits no edema  Neurological: She is alert and oriented to person, place, and time  No cranial nerve deficit  Coordination normal    Psychiatric: She has a normal mood and affect  Her behavior is normal    Nursing note and vitals reviewed        Pertinent Laboratory/Diagnostic Studies:  Lab Results   Component Value Date    BUN 20 10/28/2017    CREATININE 0 76 10/28/2017    CALCIUM 9 9 10/28/2017     10/28/2017    K 4 4 10/28/2017    CO2 25 10/28/2017     10/28/2017     Lab Results   Component Value Date    ALT 39 (H) 10/28/2017    AST 28 10/28/2017    ALKPHOS 72 10/28/2017    BILITOT 0 5 10/28/2017       Lab Results   Component Value Date    WBC 6 2 10/28/2017    HGB 14 1 10/28/2017    HCT 41 8 10/28/2017    MCV 84 1 10/28/2017  10/28/2017       No results found for: TSH    Lab Results   Component Value Date    CHOL 197 10/28/2017     Lab Results   Component Value Date    TRIG 163 (H) 10/28/2017     Lab Results   Component Value Date    HDL 36 (L) 10/28/2017     Lab Results   Component Value Date    LDLCALC 110 (H) 08/08/2014     No results found for: HGBA1C    Results for orders placed or performed during the hospital encounter of 08/06/19   Tissue Exam   Result Value Ref Range    Case Report       Surgical Pathology Report                         Case: [id number]                                   Authorizing Provider:  Diane Rios MD             Collected:           08/06/2019 1025              Ordering Location:     [facility] End        Received:            08/06/2019 [address]  [facility]  Endoscopy                                                     Pathologist:           Lamonte Nickerson MD                                                         Specimens:   A) - Esophagus, distal esophagus/ h/o Barretts esophagus                                            B) - Esophagus, island of Barretts esophagus biopsy                                        Final Diagnosis       A  Esophagus, distal, biopsy:     - Cardiac gastric and squamous junctional mucosa with mild subacute and chronic inflammation associated with       reactive epithelial changes      - No intestinal metaplasia, dysplasia or neoplasia identified  Monique Frias, island of Saucedo's, biopsy:     - Cardiac gastric and squamous junctional mucosa with intestinal metaplasia (goblet cells present) consistent with       Saucedo's esophagus      - No dysplasia or neoplasia identified  Additional Information       All controls performed with the immunohistochemical stains reported above reacted appropriately    These tests were developed and their performance characteristics determined by [facility] or [facility]  They may not be cleared or approved by the [place]  Food and Drug Administration  The FDA has determined that such clearance or approval is not necessary  These tests are used for clinical purposes  They should not be regarded as investigational or for research  This laboratory has been approved by CLIA 88, designated as a high-complexity laboratory and is qualified to perform these tests  - Interpretation performed at [facility], [address]       Gross Description       A  The specimen is received in formalin, labeled with the patient's name and hospital number, and is designated "esophagus, distal esophagus, history of Saucedo esophagus  The specimen consists of 3 tan soft tissue fragments measuring 0 1-0 3 cm in greatest dimensions  The specimen is entirely submitted in a screened cassette  B  The specimen is received in formalin, labeled with the patient's name and hospital number, and is designated [address] of Saucedo esophagus biopsy  The specimen consists of a single colorless soft tissue fragment measuring 0 6 x 0 3 x 0 1 cm  The specimen is entirely submitted in a screened cassette  Note: The estimated total formalin fixation time based upon information provided by the submitting clinician and the standard processing schedule is under 72 hours      VAlvarez                Orders Placed This Encounter   Procedures    Comprehensive metabolic panel    Lipid panel    TSH, 3rd generation    CBC and differential       ALLERGIES:  Allergies   Allergen Reactions    Pollen Extract     Sulfa Antibiotics Rash       Current Medications     Current Outpatient Medications   Medication Sig Dispense Refill    acetaminophen (TYLENOL) 500 mg tablet Take 500 mg by mouth every 6 (six) hours as needed for mild pain      ALPRAZolam (XANAX) 0 5 mg tablet Take 1 tablet (0 5 mg total) by mouth 2 (two) times a day as needed for anxiety or sleep 30 tablet 0    cetirizine (ZyrTEC) 10 mg tablet Take 10 mg by mouth daily      melatonin 3 mg Take 6 mg by mouth daily at bedtime      omeprazole (PriLOSEC) 40 MG capsule Take 40 mg by mouth daily      famotidine (PEPCID) 40 MG tablet Take 1 tablet (40 mg total) by mouth daily at bedtime 90 tablet 1    zaleplon (SONATA) 10 MG capsule Take 1 capsule at bedtime as needed for insomnia 30 capsule 2     No current facility-administered medications for this visit  There are no discontinued medications      Health Maintenance     Health Maintenance   Topic Date Due    Hepatitis C Screening  1965    DTaP,Tdap,and Td Vaccines (1 - Tdap) 07/26/1975    HIV Screening  07/26/1979    BMI: Followup Plan  07/26/1982    Annual Physical  07/26/1982    Cervical Cancer Screening  07/26/1985    PT PLAN OF CARE  04/27/2018    Influenza Vaccine  03/25/2020 (Originally 7/1/2019)    BMI: Adult  08/06/2020    MAMMOGRAM  10/03/2020    Depression Screening PHQ  01/06/2021    CRC Screening: Colonoscopy  06/02/2022    Pneumococcal Vaccine: 65+ Years (1 of 2 - PCV13) 07/26/2029    Pneumococcal Vaccine: Pediatrics (0 to 5 Years) and At-Risk Patients (6 to 59 Years)  Aged Out    HIB Vaccine  Aged Out    Hepatitis B Vaccine  Aged Out    IPV Vaccine  Aged Out    Hepatitis A Vaccine  Aged Out    Meningococcal ACWY Vaccine  Aged Out    HPV Vaccine  Aged [employer] History   Administered Date(s) Administered    Influenza Quadrivalent Preservative Free 3 years and older IM 11/07/2014       Kena Tatum MD

## 2020-02-16 PROBLEM — G47.09 OTHER INSOMNIA: Status: ACTIVE | Noted: 2020-02-16

## 2020-02-16 NOTE — ASSESSMENT & PLAN NOTE
· Patient is under care of [facility]  · EGD performed in August of 2019 is consistent with Saucedo's esophagus  ·  Continue omeprazole 40 mg daily in the morning  · Will add Pepcid 40 mg q h s  for breakthrough reflux symptoms  · Importance of weight loss and bland diet emphasized  · Gastroenterology mention possibility ofSTRETTA if reflux symptoms are persisting

## 2020-03-02 DIAGNOSIS — L30.9 DERMATITIS: Primary | ICD-10-CM

## 2020-03-02 RX ORDER — TRIAMCINOLONE ACETONIDE 1 MG/G
CREAM TOPICAL DAILY
Qty: 30 G | Refills: 0 | Status: SHIPPED | OUTPATIENT
Start: 2020-03-02 | End: 2021-04-16 | Stop reason: ALTCHOICE

## 2020-03-02 RX ORDER — KETOCONAZOLE 20 MG/G
CREAM TOPICAL DAILY
Qty: 30 G | Refills: 0 | Status: SHIPPED | OUTPATIENT
Start: 2020-03-02 | End: 2021-04-16 | Stop reason: ALTCHOICE

## 2020-04-29 DIAGNOSIS — K21.9 CHRONIC GERD: ICD-10-CM

## 2020-04-29 RX ORDER — ESOMEPRAZOLE MAGNESIUM 40 MG/1
CAPSULE, DELAYED RELEASE ORAL
Qty: 30 CAPSULE | Refills: 6 | Status: SHIPPED | OUTPATIENT
Start: 2020-04-29 | End: 2020-11-14

## 2020-06-04 DIAGNOSIS — G47.09 OTHER INSOMNIA: ICD-10-CM

## 2020-06-05 RX ORDER — ZALEPLON 10 MG/1
CAPSULE ORAL
Qty: 30 CAPSULE | Refills: 2 | Status: SHIPPED | OUTPATIENT
Start: 2020-06-05 | End: 2020-09-10

## 2020-06-17 ENCOUNTER — OFFICE VISIT (OUTPATIENT)
Dept: GASTROENTEROLOGY | Facility: CLINIC | Age: 55
End: 2020-06-17
Payer: COMMERCIAL

## 2020-06-17 VITALS
HEIGHT: 63 IN | TEMPERATURE: 98.7 F | BODY MASS INDEX: 34.69 KG/M2 | HEART RATE: 75 BPM | WEIGHT: 195.8 LBS | DIASTOLIC BLOOD PRESSURE: 78 MMHG | SYSTOLIC BLOOD PRESSURE: 121 MMHG

## 2020-06-17 DIAGNOSIS — K76.0 HEPATIC STEATOSIS: ICD-10-CM

## 2020-06-17 DIAGNOSIS — K21.9 GASTROESOPHAGEAL REFLUX DISEASE WITHOUT ESOPHAGITIS: ICD-10-CM

## 2020-06-17 DIAGNOSIS — K22.70 BARRETT'S ESOPHAGUS WITHOUT DYSPLASIA: Primary | ICD-10-CM

## 2020-06-17 DIAGNOSIS — R05.9 COUGH: ICD-10-CM

## 2020-06-17 PROCEDURE — 3008F BODY MASS INDEX DOCD: CPT | Performed by: PHYSICIAN ASSISTANT

## 2020-06-17 PROCEDURE — 99214 OFFICE O/P EST MOD 30 MIN: CPT | Performed by: PHYSICIAN ASSISTANT

## 2020-06-17 PROCEDURE — 1036F TOBACCO NON-USER: CPT | Performed by: PHYSICIAN ASSISTANT

## 2020-06-18 ENCOUNTER — TELEPHONE (OUTPATIENT)
Dept: GASTROENTEROLOGY | Facility: CLINIC | Age: 55
End: 2020-06-18

## 2020-06-24 ENCOUNTER — HOSPITAL ENCOUNTER (OUTPATIENT)
Dept: RADIOLOGY | Facility: HOSPITAL | Age: 55
Discharge: HOME | End: 2020-06-24
Payer: COMMERCIAL

## 2020-06-24 DIAGNOSIS — K21.9 GASTROESOPHAGEAL REFLUX DISEASE WITHOUT ESOPHAGITIS: ICD-10-CM

## 2020-06-24 DIAGNOSIS — R05.9 COUGH: ICD-10-CM

## 2020-06-24 DIAGNOSIS — K22.70 BARRETT'S ESOPHAGUS WITHOUT DYSPLASIA: ICD-10-CM

## 2020-06-24 PROCEDURE — 74220 X-RAY XM ESOPHAGUS 1CNTRST: CPT

## 2020-06-25 ENCOUNTER — TELEPHONE (OUTPATIENT)
Dept: GASTROENTEROLOGY | Facility: CLINIC | Age: 55
End: 2020-06-25

## 2020-06-25 NOTE — TELEPHONE ENCOUNTER
I spoke to [facility] regarding barium swallow results  She has small hiatal hernia with dysmotility  I would recommend esophageal manometry testing prior to [address] [place] [address], not only to rule out motility disorder, but also to more accurately measure size of hiatal hernia  She would like to think about this for a while  She will call our office if she is interested in proceeding with manometry

## 2020-09-09 DIAGNOSIS — G47.09 OTHER INSOMNIA: ICD-10-CM

## 2020-09-10 RX ORDER — ZALEPLON 10 MG/1
CAPSULE ORAL
Qty: 30 CAPSULE | Refills: 2 | Status: SHIPPED | OUTPATIENT
Start: 2020-09-10 | End: 2020-12-21

## 2020-09-18 ENCOUNTER — OFFICE VISIT (OUTPATIENT)
Dept: GASTROENTEROLOGY | Facility: MEDICAL CENTER | Age: 55
End: 2020-09-18
Payer: COMMERCIAL

## 2020-09-18 VITALS
HEART RATE: 86 BPM | WEIGHT: 195 LBS | DIASTOLIC BLOOD PRESSURE: 78 MMHG | BODY MASS INDEX: 34.55 KG/M2 | HEIGHT: 63 IN | SYSTOLIC BLOOD PRESSURE: 122 MMHG | TEMPERATURE: 98.3 F

## 2020-09-18 DIAGNOSIS — K76.0 HEPATIC STEATOSIS: ICD-10-CM

## 2020-09-18 DIAGNOSIS — Z00.00 HEALTHCARE MAINTENANCE: ICD-10-CM

## 2020-09-18 DIAGNOSIS — K22.70 BARRETT'S ESOPHAGUS WITHOUT DYSPLASIA: Primary | ICD-10-CM

## 2020-09-18 DIAGNOSIS — K21.00 GASTROESOPHAGEAL REFLUX DISEASE WITH ESOPHAGITIS: ICD-10-CM

## 2020-09-18 PROCEDURE — 1036F TOBACCO NON-USER: CPT | Performed by: INTERNAL MEDICINE

## 2020-09-18 PROCEDURE — 99214 OFFICE O/P EST MOD 30 MIN: CPT | Performed by: INTERNAL MEDICINE

## 2020-09-18 NOTE — PROGRESS NOTES
Outpatient Follow up  [facility] [address] [address]  Modesto Herrera MD  Ph : [phone]  Fax : [phone]  Mobile : [phone]  Email : [email or web address]  Also available on Tiger Text    Suhas Hendrickson 63 y o  female MRN: [id number]    PCP: Jacob De La Paz MD  Referring: No referring provider defined for this encounter  Suhas Hendrickson presented for a follow up visit  My recommendations are included  Please do not hesitate to contact me with any questions you may have  ASSESSMENT AND PLAN:      No problem-specific Assessment & Plan notes found for this encounter  Diagnoses and all orders for this visit:    Saucedo's esophagus without dysplasia  -     Espoh manometry/24hr ph; Future    Gastroesophageal reflux disease with esophagitis  -     Espoh manometry/24hr ph; Future    Hepatic steatosis  -     US right upper quadrant; Future  -     US elastography; Future    Healthcare maintenance  -     Hemoglobin A1C; Future  -     Hepatitis C antibody; Future    Other orders  -     Diet NPO; Sips with meds; Standing  -     Void on call to OR; Standing  -     Insert peripheral IV; Standing    1  GERD with esophagitis/Saucdeo's esophagus short-segment 1 cm tongue without dysplasia  She did not have any evidence of esophagitis on this prior EGD  Take Nexium in the morning  Discussed the GERD diet  However has had in the past   I would like to get a 24 hour pH study  We discussed regarding future therapy including continuing PPI, endoscopic therapy such as STRETTA or Transoral incisionless fundoplication (TIF) or surgical options such as Nissen or LINX  I discussed that prior to this she will need to have a manometry done due to the finding of esophageal dysmotility seen on barium swallow  2  Esophageal dysmotility seen on barium swallow  Recommend manometry  3  Hepatic steatosis    Get ultrasound and elastography  Recommended diet and weight control  5-7% body weight loss  Check A1c  Lipid panel was reviewed  4  Health maintenance  Check A1c and hepatitis C antibody  ______________________________________________________________________    SUBJECTIVE:  62 y/o with symptoms of GERD  She has had epigastric discomfort intermittently  She also has left upper quadrant pain which can be constant as well  She also has choking sensation with water/ dysphagia  She does not have much reflux/ heartburn  She has no nausea, vomiting  She is on the Nexium 40 mg daily (at night)  She also complains of bloating  She had a barium swallow done which showed esophageal dysmotility  She has no LPR symptoms  Her weight is the same  She had a ultrasound done in the past which showed hepatic steatosis  We discussed her diet at length  REVIEW OF SYSTEMS IS OTHERWISE NEGATIVE  Historical Information   Past Medical History:   Diagnosis Date    Abdominal pain     Anxiety     Back pain     Back pain     Saucedo's esophagus     Bilateral ovarian cysts     Cyst of buttocks     Diverticulosis     GERD (gastroesophageal reflux disease)     Hepatic steatosis     Hyperlipidemia     IBS (irritable bowel syndrome)     IBS (irritable bowel syndrome)     Liver disease     fatty liver    Neuroma     Pink eye disease of both eyes     Chronic and was recently treated    Seasonal allergies     Squamous cell carcinoma     Neck     Past Surgical History:   Procedure Laterality Date    COLONOSCOPY  06/02/2017    ESOPHAGOGASTRODUODENOSCOPY      LIPOMA RESECTION N/A 8/30/2017    Procedure: EXCISION GLUTEAL MASS;  Surgeon: Janie Graff MD;  [facility];  Service: General    MOHS SURGERY Left     neck    NERVE REPAIR Right 11/17/2017    Procedure: 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 (FROZEN SECTION);   Surgeon: Savanna Mondragon MD;  [facility];  Service: Neurosurgery    OH COLONOSCOPY FLX DX W/COLLJ SPEC WHEN PFRMD N/A 6/2/2017    Procedure: EGD AND COLONOSCOPY;  Surgeon: Jhon Gonzalez MD;  [facility]; Service: Gastroenterology    UPPER GASTROINTESTINAL ENDOSCOPY  08/06/2019     Social History   Social History     Substance and Sexual Activity   Alcohol Use Yes    Comment: 5 monthly; socially     Social History     Substance and Sexual Activity   Drug Use No     Social History     Tobacco Use   Smoking Status Never Smoker   Smokeless Tobacco Never Used     Family History   Problem Relation Age of Onset    Cancer Father     Colon cancer Father     Dementia Father     Colon polyps Mother     Thyroid disease Mother        Meds/Allergies       Current Outpatient Medications:     acetaminophen (TYLENOL) 500 mg tablet    ALPRAZolam (XANAX) 0 5 mg tablet    cetirizine (ZyrTEC) 10 mg tablet    esomeprazole (NexIUM) 40 MG capsule    famotidine (PEPCID) 40 MG tablet    ketoconazole (NIZORAL) 2 % cream    melatonin 3 mg    triamcinolone (KENALOG) 0 1 % cream    zaleplon (SONATA) 10 MG capsule    Allergies   Allergen Reactions    Pollen Extract     Sulfa Antibiotics Rash           Objective     Blood pressure 122/78, pulse 86, temperature 98 3 °F (36 8 °C), temperature source Tympanic, height 5' 3" (1 6 m), weight 88 5 kg (195 lb)  Body mass index is 34 54 kg/m²  PHYSICAL EXAM:      Physical Exam  Constitutional:       Appearance: Normal appearance  She is well-developed  HENT:      Head: Normocephalic and atraumatic  Eyes:      General: No scleral icterus  Conjunctiva/sclera: Conjunctivae normal       Pupils: Pupils are equal, round, and reactive to light  Neck:      Musculoskeletal: Normal range of motion  Cardiovascular:      Rate and Rhythm: Normal rate and regular rhythm  Heart sounds: Normal heart sounds  Pulmonary:      Effort: Pulmonary effort is normal  No respiratory distress  Breath sounds: Normal breath sounds     Abdominal: General: Bowel sounds are normal  There is no distension  Palpations: Abdomen is soft  There is no mass  Tenderness: There is no abdominal tenderness  Hernia: No hernia is present  Musculoskeletal: Normal range of motion  Lymphadenopathy:      Cervical: No cervical adenopathy  Skin:     General: Skin is warm  Neurological:      Mental Status: She is alert and oriented to person, place, and time  Psychiatric:         Behavior: Behavior normal          Thought Content: Thought content normal          Lab Results:   No visits with results within 1 Day(s) from this visit  Latest known visit with results is:   Hospital Outpatient Visit on 08/06/2019   Component Date Value    Case Report 08/06/2019                      Value:Surgical Pathology Report                         Case: [id number]                                   Authorizing Provider:  Sammy Cosme MD             Collected:           08/06/2019 1025              Ordering Location:     [facility]        Received:            08/06/2019 [address]  [facility]  Endoscopy                                                     Pathologist:           Jessica Arevalo MD                                                         Specimens:   A) - Esophagus, distal esophagus/ h/o Barretts esophagus                                            B) - Esophagus, island of Barretts esophagus biopsy                                        Final Diagnosis 08/06/2019                      Value: This result contains rich text formatting which cannot be displayed here   Additional Information 08/06/2019                      Value: This result contains rich text formatting which cannot be displayed here  Mary Buchanan Gross Description 08/06/2019                      Value: This result contains rich text formatting which cannot be displayed here  Radiology Results:   No results found

## 2020-09-18 NOTE — PATIENT INSTRUCTIONS
1  Simethicone / Gas-X/ Phazyme - once or twice daily and see if it helps  2  Nexium in the morning and eat something half hour after  3  GERD diet  Gastroesophageal Reflux Disease   AMBULATORY CARE:   Gastroesophageal reflux  reflux occurs when acid and food in the stomach back up into the esophagus  Gastroesophageal reflux disease (GERD) is reflux that occurs more than twice a week for a few weeks  It usually causes heartburn and other symptoms  GERD can cause other health problems over time if it is not treated  Common symptoms include:  Heartburn is the most common symptom of GERD  You may feel burning pain in your chest or below the breast bone  This usually occurs after meals and spreads to your neck, jaw, or shoulder  The pain gets better when you change positions  You may also have any of the following:  · Bitter or acid taste in your mouth    · Dry cough    · Trouble swallowing or pain with swallowing    · Hoarseness or sore throat    · Frequent burping or hiccups    · Feeling of fullness soon after you start eating  Seek care immediately if:  · You feel full and cannot burp or vomit  · You have severe chest pain and sudden trouble breathing  · Your bowel movements are black, bloody, or tarry-looking  · Your vomit looks like coffee grounds or has blood in it  Contact your healthcare provider if:   · You vomit large amounts, or you vomit often  · You have trouble breathing after you vomit  · You have trouble swallowing, or pain with swallowing  · You are losing weight without trying  · Your symptoms get worse or do not improve with treatment  · You have questions or concerns about your condition or care  Treatment for GERD:  Your healthcare provider may prescribe medicine to decrease stomach acid  He may also prescribe medicine that help your esophagus and stomach move food and liquid to your intestines  Surgery may be done if other treatments do not work   You may need surgery to wrap the upper part of the stomach around the esophageal sphincter  This will strengthen the sphincter and prevent reflux  Manage GERD:   · Do not have foods or drinks that may increase heartburn  These include chocolate, peppermint, fried or fatty foods, drinks that contain caffeine, or carbonated drinks (soda)  Other foods include spicy foods, onions, tomatoes, and tomato-based foods  Do not have foods or drinks that can irritate your esophagus, such as citrus fruits, juices, and alcohol  · Do not eat large meals  When you eat a lot of food at one time, your stomach needs more acid to digest it  Eat 6 small meals each day instead of 3 large ones, and eat slowly  Do not eat meals 2 to 3 hours before bedtime  · Elevate the head of your bed  Place 6-inch blocks under the head of your bed frame  You may also use more than one pillow under your head and shoulders while you sleep  · Maintain a healthy weight  If you are overweight, weight loss may help relieve symptoms of GERD  · Do not smoke  Smoking weakens the lower esophageal sphincter and increases the risk of GERD  Ask your healthcare provider for information if you currently smoke and need help to quit  E-cigarettes or smokeless tobacco still contain nicotine  Talk to your healthcare provider before you use these products  · Do not wear clothing that is tight around your waist   Tight clothing can put pressure on your stomach and cause or worsen GERD symptoms  Follow up with your healthcare provider as directed:  Write down your questions so you remember to ask them during your visits  © 2017 [employer]  Information is for End User's use only and may not be sold, redistributed or otherwise used for commercial purposes  All illustrations and images included in CareNotes® are the copyrighted property of [employer]  or [employer]  The above information is an  only   It is not intended as medical advice for individual conditions or treatments  Talk to your doctor, nurse or pharmacist before following any medical regimen to see if it is safe and effective for you

## 2020-09-24 ENCOUNTER — TELEPHONE (OUTPATIENT)
Dept: GASTROENTEROLOGY | Facility: AMBULARY SURGERY CENTER | Age: 55
End: 2020-09-24

## 2020-09-24 NOTE — TELEPHONE ENCOUNTER
Patients GI provider:  Dr Darryl Mujica    Number to return call: ( [phone]    Reason for call: Pt calling to get a doctor's not for her appt that was 9-18-20  She would like it emailed to her at :  [email or web address]    Scheduled procedure/appointment date if applicable: Apt/procedure   12-21-20

## 2020-10-16 ENCOUNTER — TELEPHONE (OUTPATIENT)
Dept: FAMILY MEDICINE CLINIC | Facility: CLINIC | Age: 55
End: 2020-10-16

## 2020-10-19 ENCOUNTER — TELEPHONE (OUTPATIENT)
Dept: GASTROENTEROLOGY | Facility: CLINIC | Age: 55
End: 2020-10-19

## 2020-10-26 ENCOUNTER — HOSPITAL ENCOUNTER (OUTPATIENT)
Dept: GASTROENTEROLOGY | Facility: HOSPITAL | Age: 55
Discharge: HOME | End: 2020-10-26
Attending: INTERNAL MEDICINE
Payer: COMMERCIAL

## 2020-10-26 VITALS
RESPIRATION RATE: 16 BRPM | SYSTOLIC BLOOD PRESSURE: 132 MMHG | TEMPERATURE: 98.5 F | HEART RATE: 68 BPM | OXYGEN SATURATION: 94 % | DIASTOLIC BLOOD PRESSURE: 82 MMHG

## 2020-10-26 DIAGNOSIS — K22.70 BARRETT'S ESOPHAGUS WITHOUT DYSPLASIA: ICD-10-CM

## 2020-10-26 DIAGNOSIS — K21.00 GASTROESOPHAGEAL REFLUX DISEASE WITH ESOPHAGITIS: ICD-10-CM

## 2020-10-26 PROCEDURE — 91020 GASTRIC MOTILITY STUDIES: CPT

## 2020-10-26 PROCEDURE — 91038 ESOPH IMPED FUNCT TEST > 1HR: CPT

## 2020-11-14 DIAGNOSIS — K21.9 CHRONIC GERD: ICD-10-CM

## 2020-11-14 PROCEDURE — 91038 ESOPH IMPED FUNCT TEST > 1HR: CPT | Performed by: INTERNAL MEDICINE

## 2020-11-14 PROCEDURE — 91010 ESOPHAGUS MOTILITY STUDY: CPT | Performed by: INTERNAL MEDICINE

## 2020-11-14 RX ORDER — ESOMEPRAZOLE MAGNESIUM 40 MG/1
CAPSULE, DELAYED RELEASE ORAL
Qty: 30 CAPSULE | Refills: 6 | Status: SHIPPED | OUTPATIENT
Start: 2020-11-14 | End: 2021-06-16

## 2020-11-18 ENCOUNTER — HOSPITAL ENCOUNTER (OUTPATIENT)
Dept: RADIOLOGY | Facility: HOSPITAL | Age: 55
Discharge: HOME | End: 2020-11-18
Attending: INTERNAL MEDICINE
Payer: COMMERCIAL

## 2020-11-18 DIAGNOSIS — K76.0 HEPATIC STEATOSIS: ICD-10-CM

## 2020-11-18 PROCEDURE — 76705 ECHO EXAM OF ABDOMEN: CPT

## 2020-11-18 PROCEDURE — 76981 USE PARENCHYMA: CPT

## 2020-12-13 LAB
ALBUMIN SERPL-MCNC: 4.5 G/DL (ref 3.6–5.1)
ALBUMIN/GLOB SERPL: 2 (CALC) (ref 1–2.5)
ALP SERPL-CCNC: 59 U/L (ref 37–153)
ALT SERPL-CCNC: 31 U/L (ref 6–29)
AST SERPL-CCNC: 21 U/L (ref 10–35)
BASOPHILS # BLD AUTO: 29 CELLS/UL (ref 0–200)
BASOPHILS NFR BLD AUTO: 0.5 %
BILIRUB SERPL-MCNC: 0.4 MG/DL (ref 0.2–1.2)
BUN SERPL-MCNC: 17 MG/DL (ref 7–25)
BUN/CREAT SERPL: ABNORMAL (CALC) (ref 6–22)
CALCIUM SERPL-MCNC: 10 MG/DL (ref 8.6–10.4)
CHLORIDE SERPL-SCNC: 103 MMOL/L (ref 98–110)
CHOLEST SERPL-MCNC: 211 MG/DL
CHOLEST/HDLC SERPL: 5.7 (CALC)
CO2 SERPL-SCNC: 29 MMOL/L (ref 20–32)
CREAT SERPL-MCNC: 0.72 MG/DL (ref 0.5–1.05)
EOSINOPHIL # BLD AUTO: 162 CELLS/UL (ref 15–500)
EOSINOPHIL NFR BLD AUTO: 2.8 %
ERYTHROCYTE [DISTWIDTH] IN BLOOD BY AUTOMATED COUNT: 13.5 % (ref 11–15)
GLOBULIN SER CALC-MCNC: 2.2 G/DL (CALC) (ref 1.9–3.7)
GLUCOSE SERPL-MCNC: 97 MG/DL (ref 65–99)
HCT VFR BLD AUTO: 42.7 % (ref 35–45)
HDLC SERPL-MCNC: 37 MG/DL
HGB BLD-MCNC: 14 G/DL (ref 11.7–15.5)
LDLC SERPL CALC-MCNC: 140 MG/DL (CALC)
LYMPHOCYTES # BLD AUTO: 1757 CELLS/UL (ref 850–3900)
LYMPHOCYTES NFR BLD AUTO: 30.3 %
MCH RBC QN AUTO: 28.2 PG (ref 27–33)
MCHC RBC AUTO-ENTMCNC: 32.8 G/DL (ref 32–36)
MCV RBC AUTO: 85.9 FL (ref 80–100)
MONOCYTES # BLD AUTO: 441 CELLS/UL (ref 200–950)
MONOCYTES NFR BLD AUTO: 7.6 %
NEUTROPHILS # BLD AUTO: 3410 CELLS/UL (ref 1500–7800)
NEUTROPHILS NFR BLD AUTO: 58.8 %
NONHDLC SERPL-MCNC: 174 MG/DL (CALC)
PLATELET # BLD AUTO: 243 THOUSAND/UL (ref 140–400)
PMV BLD REES-ECKER: 12 FL (ref 7.5–12.5)
POTASSIUM SERPL-SCNC: 4.4 MMOL/L (ref 3.5–5.3)
PROT SERPL-MCNC: 6.7 G/DL (ref 6.1–8.1)
RBC # BLD AUTO: 4.97 MILLION/UL (ref 3.8–5.1)
SL AMB EGFR AFRICAN AMERICAN: 108 ML/MIN/1.73M2
SL AMB EGFR NON AFRICAN AMERICAN: 94 ML/MIN/1.73M2
SODIUM SERPL-SCNC: 140 MMOL/L (ref 135–146)
TRIGL SERPL-MCNC: 206 MG/DL
TSH SERPL-ACNC: 1.67 MIU/L (ref 0.4–4.5)
WBC # BLD AUTO: 5.8 THOUSAND/UL (ref 3.8–10.8)

## 2020-12-14 LAB
HBA1C MFR BLD: 5.7 % OF TOTAL HGB
HCV AB S/CO SERPL IA: 0.01
HCV AB SERPL QL IA: NORMAL

## 2020-12-15 DIAGNOSIS — Z20.822 EXPOSURE TO COVID-19 VIRUS: Primary | ICD-10-CM

## 2020-12-17 DIAGNOSIS — Z20.822 EXPOSURE TO COVID-19 VIRUS: ICD-10-CM

## 2020-12-17 PROCEDURE — U0003 INFECTIOUS AGENT DETECTION BY NUCLEIC ACID (DNA OR RNA); SEVERE ACUTE RESPIRATORY SYNDROME CORONAVIRUS 2 (SARS-COV-2) (CORONAVIRUS DISEASE [COVID-19]), AMPLIFIED PROBE TECHNIQUE, MAKING USE OF HIGH THROUGHPUT TECHNOLOGIES AS DESCRIBED BY CMS-2020-01-R: HCPCS | Performed by: FAMILY MEDICINE

## 2020-12-18 LAB — SARS-COV-2 RNA SPEC QL NAA+PROBE: NOT DETECTED

## 2020-12-20 DIAGNOSIS — G47.09 OTHER INSOMNIA: ICD-10-CM

## 2020-12-21 RX ORDER — ZALEPLON 10 MG/1
CAPSULE ORAL
Qty: 30 CAPSULE | Refills: 3 | Status: SHIPPED | OUTPATIENT
Start: 2020-12-21 | End: 2021-05-21

## 2021-01-12 ENCOUNTER — DOCUMENTATION (OUTPATIENT)
Dept: GASTROENTEROLOGY | Facility: MEDICAL CENTER | Age: 56
End: 2021-01-12

## 2021-01-12 NOTE — PROGRESS NOTES
Reviewed the pH and manometry with the patient  She is doing well on Nexium  Still has occasional regurgitation episodes and this goes along with the pH study  I would recommend ongoing treatment with nexium and if symptoms persist then evaluation for endoscopic or surgical procedures  Thanks

## 2021-01-22 ENCOUNTER — TRANSCRIBE ORDERS (OUTPATIENT)
Dept: ADMINISTRATIVE | Facility: HOSPITAL | Age: 56
End: 2021-01-22

## 2021-01-22 DIAGNOSIS — Z12.31 ENCOUNTER FOR SCREENING MAMMOGRAM FOR MALIGNANT NEOPLASM OF BREAST: Primary | ICD-10-CM

## 2021-02-22 DIAGNOSIS — B34.9 VIRAL INFECTION, UNSPECIFIED: ICD-10-CM

## 2021-02-22 DIAGNOSIS — Z20.822 EXPOSURE TO COVID-19 VIRUS: ICD-10-CM

## 2021-02-22 LAB — SARS-COV-2 RNA RESP QL NAA+PROBE: POSITIVE

## 2021-02-22 PROCEDURE — U0003 INFECTIOUS AGENT DETECTION BY NUCLEIC ACID (DNA OR RNA); SEVERE ACUTE RESPIRATORY SYNDROME CORONAVIRUS 2 (SARS-COV-2) (CORONAVIRUS DISEASE [COVID-19]), AMPLIFIED PROBE TECHNIQUE, MAKING USE OF HIGH THROUGHPUT TECHNOLOGIES AS DESCRIBED BY CMS-2020-01-R: HCPCS | Performed by: FAMILY MEDICINE

## 2021-02-22 PROCEDURE — U0005 INFEC AGEN DETEC AMPLI PROBE: HCPCS | Performed by: FAMILY MEDICINE

## 2021-02-23 ENCOUNTER — TELEMEDICINE (OUTPATIENT)
Dept: FAMILY MEDICINE CLINIC | Facility: CLINIC | Age: 56
End: 2021-02-23
Payer: COMMERCIAL

## 2021-02-23 DIAGNOSIS — U07.1 COVID-19 VIRUS INFECTION: Primary | ICD-10-CM

## 2021-02-23 PROCEDURE — 99213 OFFICE O/P EST LOW 20 MIN: CPT | Performed by: FAMILY MEDICINE

## 2021-02-23 PROCEDURE — 1036F TOBACCO NON-USER: CPT | Performed by: FAMILY MEDICINE

## 2021-02-23 NOTE — PROGRESS NOTES
COVID-19 Virtual Visit     Assessment/Plan:    Problem List Items Addressed This Visit     None      Visit Diagnoses     COVID-19 virus infection    -  Primary         Disposition:     I recommended continued isolation until at least 24 hours have passed since recovery defined as resolution of fever without the use of fever-reducing medications AND improvement in COVID symptoms AND 10 days have passed since onset of symptoms (or 10 days have passed since date of first positive viral diagnostic test for asymptomatic patients)  Recent diagnosis of COVID-19 infection  Patient denies symptoms of dyspnea, cough or chest tightness  She has been feeling very fatigued  Patient uses Tylenol as needed with overall good symptom control  She remains on regimen of vitamin D3, vitamin-C and zinc   Patient has been monitoring pulse ox on a regular basis and it is normal   We reviewed red flags  Patient will contact me immediately with any onset of chest tightness, shortness of breath, cough or pulse ox below 92%  Continue rest, fluids, supportive care, proning  Patient is not a candidate for Bamlanivab infusion since her BMI is 33 7      I have spent 15 minutes directly with the patient  Greater than 50% of this time was spent in counseling/coordination of care regarding: instructions for management, patient and family education and risk factor reductions  Encounter provider Baron Packer MD    Provider located at [address] [place] [id number]    Recent Visits  Date Type Provider Dept   02/23/21 Telemedicine Baron Packer MD [address] recent visits within past 7 days and meeting all other requirements     Future Appointments  No visits were found meeting these conditions     Showing future appointments within next 150 days and meeting all other requirements      This virtual check-in was done via [employer] and patient was informed that this is not a secure, HIPAA-compliant platform  She agrees to proceed  Patient agrees to participate in a virtual check in via telephone or video visit instead of presenting to the office to address urgent/immediate medical needs  Patient is aware this is a billable service  After connecting through [place], the patient was identified by name and date of birth  Sandra Gregory was informed that this was a telemedicine visit and that the exam was being conducted confidentially over secure lines  My office door was closed  No one else was in the room  Sandra Gregory acknowledged consent and understanding of privacy and security of the telemedicine visit  I informed the patient that I have reviewed her record in Epic and presented the opportunity for her to ask any questions regarding the visit today  The patient agreed to participate  Subjective:   Sandra Gregory is a 63 y o  female who has been screened for COVID-19  Symptom change since last report: unchanged  Patient's symptoms include fever, fatigue, malaise, nasal congestion, rhinorrhea, sore throat (minor), anosmia and headache  Patient denies chills, loss of taste, cough, shortness of breath, chest tightness, abdominal pain, nausea, vomiting, diarrhea and myalgias  Rosana Zhao has been staying home and has isolated themselves in her home  She is taking care to not share personal items and is cleaning all surfaces that are touched often, like counters, tabletops, and doorknobs using household cleaning sprays or wipes  She is wearing a mask when she leaves her room  Date of symptom onset: 2/19/2021  Date of positive COVID-19 PCR: 2/22/2021    Positive COVID test     Patient became symptomatic on February 19th and tested positive on February 22nd  Her  and son were tested positive for COVID as well  Patient denies symptoms of cough, chest tightness or shortness of breath      She feels very fatigued and achy     Patient reports nasal congestion  She has been resting  Appetite is decreased  Patient uses Tylenol as needed and it helps  She remains on regimen of vitamin D3, vitamin-C and zinc     Patient has been monitoring pulse ox at home and it is normal         Lab Results   Component Value Date    SARSCOV2 Positive (A) 02/22/2021    SARSCOV2 Not Detected 12/17/2020     Past Medical History:   Diagnosis Date    Abdominal pain     Anxiety     Back pain     Back pain     Saucedo's esophagus     Bilateral ovarian cysts     Cyst of buttocks     Diverticulosis     GERD (gastroesophageal reflux disease)     Hepatic steatosis     Hyperlipidemia     IBS (irritable bowel syndrome)     IBS (irritable bowel syndrome)     Liver disease     fatty liver    Neuroma     Pink eye disease of both eyes     Chronic and was recently treated    Seasonal allergies     Squamous cell carcinoma     Neck     Past Surgical History:   Procedure Laterality Date    COLONOSCOPY  06/02/2017    ESOPHAGOGASTRODUODENOSCOPY      LIPOMA RESECTION N/A 8/30/2017    Procedure: EXCISION GLUTEAL MASS;  Surgeon: Jeanie Prasad MD;  [facility];  Service: General    MOHS SURGERY Left     neck    NERVE REPAIR Right 11/17/2017    Procedure: 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 (FROZEN SECTION); Surgeon: Hay Overton MD;  [facility];  Service: Neurosurgery    ID COLONOSCOPY FLX DX W/[facility] INPATIENT [facility] WHEN PFRMD N/A 6/2/2017    Procedure: EGD AND COLONOSCOPY;  Surgeon: Mai Marquez MD;  [facility];   Service: Gastroenterology    UPPER GASTROINTESTINAL ENDOSCOPY  08/06/2019     Current Outpatient Medications   Medication Sig Dispense Refill    acetaminophen (TYLENOL) 500 mg tablet Take 500 mg by mouth every 6 (six) hours as needed for mild pain      ALPRAZolam (XANAX) 0 5 mg tablet Take 1 tablet (0 5 mg total) by mouth 2 (two) times a day as needed for anxiety or sleep 30 tablet 0    cetirizine (ZyrTEC) 10 mg tablet Take 10 mg by mouth daily      esomeprazole (NexIUM) 40 MG capsule TAKE ONE CAPSULE BY MOUTH EVERY DAY 30 capsule 6    ketoconazole (NIZORAL) 2 % cream Apply topically daily 30 g 0    melatonin 3 mg Take 6 mg by mouth daily at bedtime      triamcinolone (KENALOG) 0 1 % cream Apply topically daily 30 g 0    zaleplon (SONATA) 10 MG capsule TAKE ONE CAPSULE BY MOUTH AT BEDTIME AS NEEDED FOR INSOMNIA 30 capsule 3     No current facility-administered medications for this visit  Allergies   Allergen Reactions    Pollen Extract     Sulfa Antibiotics Rash       Review of Systems   Constitutional: Positive for fatigue and fever  Negative for chills  HENT: Positive for congestion, rhinorrhea and sore throat (minor)  Respiratory: Negative  Negative for cough, chest tightness and shortness of breath  Cardiovascular: Negative  Gastrointestinal: Negative  Negative for abdominal pain, diarrhea, nausea and vomiting  Musculoskeletal: Negative  Negative for myalgias  Neurological: Positive for headaches  Psychiatric/Behavioral: Negative  Objective: There were no vitals filed for this visit  Physical Exam  Constitutional:       General: She is not in acute distress  Appearance: Normal appearance  Comments: Fatigued, nasally congested   HENT:      Head: Normocephalic and atraumatic  Pulmonary:      Effort: Pulmonary effort is normal       Comments: Unlabored breathing  No tachypnea, cough or wheezing throughout exam   Patient is able to complete full sentences without cough  Neurological:      General: No focal deficit present  Mental Status: She is alert and oriented to person, place, and time  Psychiatric:         Mood and Affect: Mood normal          Behavior: Behavior normal          Thought Content:  Thought content normal        VIRTUAL VISIT DISCLAIMER    Cheri C Doree Bence acknowledges that she has consented to an online visit or consultation  She understands that the online visit is based solely on information provided by her, and that, in the absence of a face-to-face physical evaluation by the physician, the diagnosis she receives is both limited and provisional in terms of accuracy and completeness  This is not intended to replace a full medical face-to-face evaluation by the physician  Kristopher Sierra understands and accepts these terms

## 2021-02-27 ENCOUNTER — DOCUMENTATION (OUTPATIENT)
Dept: FAMILY MEDICINE CLINIC | Facility: CLINIC | Age: 56
End: 2021-02-27

## 2021-02-27 PROBLEM — U07.1 COVID-19 VIRUS INFECTION: Status: ACTIVE | Noted: 2021-02-27

## 2021-02-27 NOTE — PROGRESS NOTES
I spoke with patient yesterday, February 26, around 6:00 p m  patient reports overall significant improvement of fatigue  She remains afebrile  No symptoms of cough, chest tightness or shortness of breath  Patient remains on regimen of vitamins  No requirements of Tylenol within past 24 hours  Patient reports improvement of COVID-19 symptoms  She will continue close monitoring and will contact PCP with any questions or concerns  Red flags reviewed

## 2021-03-19 ENCOUNTER — HOSPITAL ENCOUNTER (OUTPATIENT)
Dept: MAMMOGRAPHY | Facility: IMAGING CENTER | Age: 56
Discharge: HOME | End: 2021-03-19
Payer: COMMERCIAL

## 2021-03-19 VITALS — BODY MASS INDEX: 34.55 KG/M2 | HEIGHT: 63 IN | WEIGHT: 195 LBS

## 2021-03-19 DIAGNOSIS — Z12.31 ENCOUNTER FOR SCREENING MAMMOGRAM FOR MALIGNANT NEOPLASM OF BREAST: ICD-10-CM

## 2021-03-19 DIAGNOSIS — Z12.31 VISIT FOR SCREENING MAMMOGRAM: ICD-10-CM

## 2021-03-19 PROCEDURE — 77067 SCR MAMMO BI INCL CAD: CPT

## 2021-03-19 PROCEDURE — 77063 BREAST TOMOSYNTHESIS BI: CPT

## 2021-04-14 ENCOUNTER — IMMUNIZATIONS (OUTPATIENT)
Dept: FAMILY MEDICINE CLINIC | Facility: HOSPITAL | Age: 56
End: 2021-04-14

## 2021-04-14 DIAGNOSIS — Z23 ENCOUNTER FOR IMMUNIZATION: Primary | ICD-10-CM

## 2021-04-14 PROCEDURE — 91300 SARS-COV-2 / COVID-19 MRNA VACCINE (PFIZER-BIONTECH) 30 MCG: CPT

## 2021-04-14 PROCEDURE — 0001A SARS-COV-2 / COVID-19 MRNA VACCINE (PFIZER-BIONTECH) 30 MCG: CPT

## 2021-04-16 ENCOUNTER — TELEPHONE (OUTPATIENT)
Dept: FAMILY MEDICINE CLINIC | Facility: CLINIC | Age: 56
End: 2021-04-16

## 2021-04-16 ENCOUNTER — OFFICE VISIT (OUTPATIENT)
Dept: FAMILY MEDICINE CLINIC | Facility: CLINIC | Age: 56
End: 2021-04-16
Payer: COMMERCIAL

## 2021-04-16 VITALS
TEMPERATURE: 97.5 F | WEIGHT: 196 LBS | OXYGEN SATURATION: 96 % | DIASTOLIC BLOOD PRESSURE: 82 MMHG | SYSTOLIC BLOOD PRESSURE: 130 MMHG | RESPIRATION RATE: 18 BRPM | BODY MASS INDEX: 34.73 KG/M2 | HEART RATE: 88 BPM | HEIGHT: 63 IN

## 2021-04-16 DIAGNOSIS — R06.02 SHORTNESS OF BREATH: Primary | ICD-10-CM

## 2021-04-16 PROCEDURE — 3725F SCREEN DEPRESSION PERFORMED: CPT | Performed by: NURSE PRACTITIONER

## 2021-04-16 PROCEDURE — 1036F TOBACCO NON-USER: CPT | Performed by: NURSE PRACTITIONER

## 2021-04-16 PROCEDURE — 93000 ELECTROCARDIOGRAM COMPLETE: CPT | Performed by: NURSE PRACTITIONER

## 2021-04-16 PROCEDURE — 99213 OFFICE O/P EST LOW 20 MIN: CPT | Performed by: NURSE PRACTITIONER

## 2021-04-16 PROCEDURE — 3008F BODY MASS INDEX DOCD: CPT | Performed by: NURSE PRACTITIONER

## 2021-04-16 RX ORDER — MULTIVIT WITH MINERALS/LUTEIN
1000 TABLET ORAL DAILY
COMMUNITY

## 2021-04-16 NOTE — TELEPHONE ENCOUNTER
Patient calling with concerns regarding shortness of breath the last 2-3 weeks  States when bending over and standing back up she has trouble catching her breath  Patient did have covid 2/23

## 2021-04-16 NOTE — PROGRESS NOTES
FAMILY PRACTICE OFFICE VISIT       NAME: Ai Ibrahim  AGE: 63 y o  SEX: female       : 1965        MRN: [id number]    Assessment and Plan     Problem List Items Addressed This Visit     None      Visit Diagnoses     Shortness of breath    -  Primary    Relevant Orders    XR chest pa & lateral    Echo stress test w contrast if indicated    Comprehensive metabolic panel    CBC and differential    TSH, 3rd generation    POCT ECG (Completed)          1  Shortness of breath  XR chest pa & lateral    Echo stress test w contrast if indicated    Comprehensive metabolic panel    CBC and differential    TSH, 3rd generation    POCT ECG     This 50-year-old female presents today for shortness of breath with climbing stairs and bending over  Symptoms began a few weeks ago  She had COVID-19 in February  She did not have any chest symptoms with COVID-19  Only COVID-19 symptoms were fatigue, body aches, and headaches  In office ECG today, normal sinus rhythm, no ischemic changes  Normal ECG  Will complete chest x-ray, echo stress test, blood work as noted  Proceed to the emergency room with any worsening of shortness of breath, or shortness of breath associated with chest pain  Chief Complaint     Chief Complaint   Patient presents with    Shortness of Breath     Pt is here for SOB when bending over 2 + wks       History of Present Illness     Ai Ibrahim is a 63year old female presenting today shortness of breath  Started a few weeks ago  When she bends over and then stands up feels short of breath  Does not happen all the time when she bends over  Had [address] end of February  Had first COVID-19 vaccine  Short of breath sometimes walking upstairs  Notes she is out of shape, works long hours, lack of time to exercise  No chest pain, palpitations, dizziness  When she had COVID-19 virus, she had no chest symptoms  Only symptoms were headaches, fatigue, body aches      She is taking esomeprazole 40 mg daily  GERD is usually under good control on this medication and avoiding food triggers  She did have some heartburn this week, due to eating a food trigger  Follows with Dr Alexandra Monreal  Significant family history of heart disease in her father and paternal grandmother  Review of Systems   Review of Systems   Constitutional: Negative  HENT: Negative  Respiratory: Positive for shortness of breath  Negative for cough, chest tightness and wheezing  Cardiovascular: Negative for chest pain, palpitations and leg swelling  Gastrointestinal: Negative  Neurological: Negative          Active Problem List     Patient Active Problem List   Diagnosis    Neurofibroma    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    Saucedo's esophagus without dysplasia    Hepatic steatosis    Irritable bowel syndrome (IBS)    Hyperlipemia    Generalized anxiety disorder    Esophageal reflux    Elevated ALT measurement    Diverticulosis    Bilateral ovarian cysts    Allergic rhinitis    Other insomnia    GERD (gastroesophageal reflux disease)    Healthcare maintenance    COVID-19 virus infection       Past Medical History:  Past Medical History:   Diagnosis Date    Abdominal pain     Anxiety     Back pain     Back pain     Saucedo's esophagus     Bilateral ovarian cysts     Cyst of buttocks     Diverticulosis     GERD (gastroesophageal reflux disease)     Hepatic steatosis     Hyperlipidemia     IBS (irritable bowel syndrome)     IBS (irritable bowel syndrome)     Liver disease     fatty liver    Neuroma     Pink eye disease of both eyes     Chronic and was recently treated    Seasonal allergies     Squamous cell carcinoma     Neck       Past Surgical History:  Past Surgical History:   Procedure Laterality Date    COLONOSCOPY  06/02/2017    ESOPHAGOGASTRODUODENOSCOPY      LIPOMA RESECTION N/A 8/30/2017    Procedure: EXCISION GLUTEAL MASS;  Surgeon: Onel Del Cid Johanna Madsen MD;  [facility];  Service: General    MOHS SURGERY Left     neck    NERVE REPAIR Right 11/17/2017    Procedure: 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 (FROZEN SECTION); Surgeon: Eladia Winslow MD;  Location:  MAIN OR;  Service: Neurosurgery    CA COLONOSCOPY FLX DX W/[facility] REHABILITATION WHEN PFRMD N/A 6/2/2017    Procedure: EGD AND COLONOSCOPY;  Surgeon: Ori Kramer MD;  Location:  GI LAB;   Service: Gastroenterology    UPPER GASTROINTESTINAL ENDOSCOPY  08/06/2019       Family History:  Family History   Problem Relation Age of Onset   [facility] Cancer Father     Colon cancer Father     Dementia Father     Colon polyps Mother     Thyroid disease Mother     No Known Problems Daughter     No Known Problems Maternal Grandmother     No Known Problems Maternal Grandfather     No Known Problems Paternal Grandmother     No Known Problems Paternal Grandfather        Social History:  Social History     Socioeconomic History    Marital status: /Civil Union     Spouse name: Not on file    Number of children: 2    Years of education: Not on file    Highest education level: Not on file   Occupational History    Not on file   Social Needs    Financial resource strain: Not on file    Food insecurity     Worry: Not on file     Inability: Not on file   [employer] needs     Medical: Not on file     Non-medical: Not on file   Tobacco Use    Smoking status: Never Smoker    Smokeless tobacco: Never Used   Substance and Sexual Activity    Alcohol use: Yes     Comment: 5 monthly; socially    Drug use: No    Sexual activity: Not on file   Lifestyle    Physical activity     Days per week: Not on file     Minutes per session: Not on file    Stress: Not on file   Relationships    Social connections     Talks on phone: Not on file     Gets together: Not on file     Attends Spiritism service: Not on file     Active member of club or organization: Not on file     Attends meetings of clubs or organizations: Not on file     Relationship status: Not on file    Intimate partner violence     Fear of current or ex partner: Not on file     Emotionally abused: Not on file     Physically abused: Not on file     Forced sexual activity: Not on file   Other Topics Concern    Not on file   Social History Narrative    Lives with spouse       I have reviewed the patient's medical history in detail; there are no changes to the history as noted in the electronic medical record  Objective     Vitals:    04/16/21 1427 04/16/21 1512   BP: 130/82 130/82   Pulse: 88    Resp: 18    Temp: 97 5 °F (36 4 °C)    TempSrc: Temporal    SpO2: 96%    Weight: 88 9 kg (196 lb)    Height: 5' 3" (1 6 m)      Wt Readings from Last 3 Encounters:   04/16/21 88 9 kg (196 lb)   03/19/21 88 5 kg (195 lb)   09/18/20 88 5 kg (195 lb)     Physical Exam  Vitals signs and nursing note reviewed  Constitutional:       General: She is not in acute distress  Appearance: She is well-developed  She is not ill-appearing, toxic-appearing or diaphoretic  HENT:      Head: Normocephalic and atraumatic  Mouth/Throat:      Mouth: Mucous membranes are moist       Pharynx: Oropharynx is clear  Neck:      Musculoskeletal: Normal range of motion and neck supple  Cardiovascular:      Rate and Rhythm: Normal rate and regular rhythm  Heart sounds: No murmur  Pulmonary:      Effort: Pulmonary effort is normal  No respiratory distress  Breath sounds: Normal breath sounds  No wheezing or rales  Abdominal:      General: Abdomen is flat  There is no distension  Palpations: Abdomen is soft  Tenderness: There is no abdominal tenderness  Musculoskeletal:      Right lower leg: No edema  Left lower leg: No edema  Lymphadenopathy:      Cervical: No cervical adenopathy  Neurological:      Mental Status: She is alert     Psychiatric:         Mood and Affect: Mood normal          Behavior: Behavior normal        BMI Counseling: Body mass index is 34 72 kg/m²  The BMI is above normal  Nutrition recommendations include encouraging healthy choices of fruits and vegetables, moderation in carbohydrate intake and increasing intake of lean protein  Exercise recommendations include exercising 3-5 times per week  ALLERGIES:  Allergies   Allergen Reactions    Pollen Extract     Sulfa Antibiotics Rash       Current Medications     Current Outpatient Medications   Medication Sig Dispense Refill    acetaminophen (TYLENOL) 500 mg tablet Take 500 mg by mouth every 6 (six) hours as needed for mild pain      ALPRAZolam (XANAX) 0 5 mg tablet Take 1 tablet (0 5 mg total) by mouth 2 (two) times a day as needed for anxiety or sleep 30 tablet 0    Ascorbic Acid (vitamin C) 1000 MG tablet Take 1,000 mg by mouth daily      cetirizine (ZyrTEC) 10 mg tablet Take 10 mg by mouth daily      Cholecalciferol (VITAMIN D3 PO) Take by mouth daily      esomeprazole (NexIUM) 40 MG capsule TAKE ONE CAPSULE BY MOUTH EVERY DAY 30 capsule 6    zaleplon (SONATA) 10 MG capsule TAKE ONE CAPSULE BY MOUTH AT BEDTIME AS NEEDED FOR INSOMNIA 30 capsule 3     No current facility-administered medications for this visit            Health Maintenance     Health Maintenance   Topic Date Due    HIV Screening  Never done    DTaP,Tdap,and Td Vaccines (1 - Tdap) Never done    Cervical Cancer Screening  Never done    PT PLAN OF CARE  04/27/2018    Influenza Vaccine (1) 09/01/2020    Annual Physical  02/11/2021    BMI: Followup Plan  02/16/2021    COVID-19 Vaccine (2 - Pfizer 2-dose series) 05/05/2021    Depression Screening PHQ  04/16/2022    BMI: Adult  04/16/2022    Colonoscopy Surveillance  06/02/2022    MAMMOGRAM  03/19/2023    Colorectal Cancer Screening  06/02/2027    Hepatitis C Screening  Completed    Pneumococcal Vaccine: Pediatrics (0 to 5 Years) and At-Risk Patients (6 to 59 Years)  Aged Out    HIB Vaccine  Aged Out    Hepatitis B Vaccine Aged Out    IPV Vaccine  Aged Out    Hepatitis A Vaccine  Aged Out    Meningococcal ACWY Vaccine  Aged Out    HPV Vaccine  Aged Out     Immunization History   Administered Date(s) Administered    Influenza Quadrivalent Preservative Free 3 years and older IM 11/07/2014    SARS-CoV-2 / COVID-19 mRNA IM (Pfizer-BioNTech) 04/14/2021       KENY Carter

## 2021-04-16 NOTE — TELEPHONE ENCOUNTER
Willis Irwin,    Please call patient, triage and schedule an office visit  Patient may benefit from albuterol inhaler for the time being, please let me know if she needs this prescription  Please let me know if there any urgent concerns    Thank you

## 2021-05-05 ENCOUNTER — HOSPITAL ENCOUNTER (OUTPATIENT)
Dept: RADIOLOGY | Facility: HOSPITAL | Age: 56
Discharge: HOME | End: 2021-05-05
Payer: COMMERCIAL

## 2021-05-05 ENCOUNTER — IMMUNIZATIONS (OUTPATIENT)
Dept: FAMILY MEDICINE CLINIC | Facility: HOSPITAL | Age: 56
End: 2021-05-05

## 2021-05-05 DIAGNOSIS — Z23 ENCOUNTER FOR IMMUNIZATION: Primary | ICD-10-CM

## 2021-05-05 DIAGNOSIS — R06.02 SHORTNESS OF BREATH: ICD-10-CM

## 2021-05-05 PROCEDURE — 91300 SARS-COV-2 / COVID-19 MRNA VACCINE (PFIZER-BIONTECH) 30 MCG: CPT

## 2021-05-05 PROCEDURE — 0002A SARS-COV-2 / COVID-19 MRNA VACCINE (PFIZER-BIONTECH) 30 MCG: CPT

## 2021-05-05 PROCEDURE — 71046 X-RAY EXAM CHEST 2 VIEWS: CPT

## 2021-05-10 ENCOUNTER — TELEPHONE (OUTPATIENT)
Dept: FAMILY MEDICINE CLINIC | Facility: CLINIC | Age: 56
End: 2021-05-10

## 2021-05-12 PROCEDURE — 88305 TISSUE EXAM BY PATHOLOGIST: CPT | Performed by: PATHOLOGY

## 2021-05-12 PROCEDURE — 88342 IMHCHEM/IMCYTCHM 1ST ANTB: CPT | Performed by: PATHOLOGY

## 2021-05-12 PROCEDURE — 88341 IMHCHEM/IMCYTCHM EA ADD ANTB: CPT | Performed by: PATHOLOGY

## 2021-05-13 ENCOUNTER — LAB REQUISITION (OUTPATIENT)
Dept: LAB | Facility: HOSPITAL | Age: 56
End: 2021-05-13
Payer: COMMERCIAL

## 2021-05-13 DIAGNOSIS — L81.9 DISORDER OF PIGMENTATION, UNSPECIFIED: ICD-10-CM

## 2021-05-21 DIAGNOSIS — G47.09 OTHER INSOMNIA: ICD-10-CM

## 2021-05-21 RX ORDER — ZALEPLON 10 MG/1
CAPSULE ORAL
Qty: 30 CAPSULE | Refills: 0 | Status: SHIPPED | OUTPATIENT
Start: 2021-05-21 | End: 2021-06-27

## 2021-06-10 ENCOUNTER — OFFICE VISIT (OUTPATIENT)
Dept: GASTROENTEROLOGY | Facility: CLINIC | Age: 56
End: 2021-06-10
Payer: COMMERCIAL

## 2021-06-10 VITALS
WEIGHT: 195 LBS | TEMPERATURE: 98.6 F | DIASTOLIC BLOOD PRESSURE: 85 MMHG | HEART RATE: 74 BPM | HEIGHT: 64 IN | BODY MASS INDEX: 33.29 KG/M2 | SYSTOLIC BLOOD PRESSURE: 137 MMHG

## 2021-06-10 DIAGNOSIS — R10.13 EPIGASTRIC PAIN: Primary | ICD-10-CM

## 2021-06-10 DIAGNOSIS — K21.9 GASTROESOPHAGEAL REFLUX DISEASE WITHOUT ESOPHAGITIS: ICD-10-CM

## 2021-06-10 DIAGNOSIS — K22.70 BARRETT'S ESOPHAGUS WITHOUT DYSPLASIA: ICD-10-CM

## 2021-06-10 DIAGNOSIS — K76.0 HEPATIC STEATOSIS: ICD-10-CM

## 2021-06-10 PROCEDURE — 1036F TOBACCO NON-USER: CPT | Performed by: INTERNAL MEDICINE

## 2021-06-10 PROCEDURE — 3008F BODY MASS INDEX DOCD: CPT | Performed by: INTERNAL MEDICINE

## 2021-06-10 PROCEDURE — 99213 OFFICE O/P EST LOW 20 MIN: CPT | Performed by: INTERNAL MEDICINE

## 2021-06-10 RX ORDER — SIMETHICONE 180 MG
180 CAPSULE ORAL EVERY 6 HOURS PRN
Qty: 30 CAPSULE | Refills: 1 | Status: SHIPPED | OUTPATIENT
Start: 2021-06-10

## 2021-06-10 NOTE — PATIENT INSTRUCTIONS
Probiotic (By mouth)   May increase the number of healthy bacteria in your stomach and intestines  Brand Name(s): 5X Probiotic, Abatinex, Acidophilus Probiotic Blend, Adult Probiotic, Align, Azo Complete Feminine Balance, BD Lactinex, BaciCap, Bacid, Bifidonate, BioGaia, BioGaia Gastrus, Children's Chewable Probiotic, Culturelle, Culturelle Advanced Immune Defense   There may be other brand names for this medicine  When This Medicine Should Not Be Used: This medicine is not right for everyone  Do not use it if you had an allergic reaction to a probiotic, such as acidophilus, bifidobacterium, lactobacillus, saccharomyces, or streptococcus thermophilus  How to Use This Medicine:   Capsule, Delayed Release Capsule, Liquid, Powder, Tablet, Stick, Spray, Chewable Tablet, Coated Tablet, Wafer  · Your doctor will tell you how much medicine to use  Do not use more than directed  · Follow the instructions on the medicine label if you are using this medicine without a prescription  · Tablet or delayed-release capsule: Swallow whole  Do not chew, crush, or break  · Powder:  Be careful to not breathe in the powder, because it may bother your lungs  Do not get the powder on your skin  If you mix the powder in food or liquid, do this right before you take the medicine  Do not mix it and then save it for later  · Chewable tablet or wafer:  Chew it completely before you swallow  · Measure the oral liquid medicine with a marked measuring spoon, oral syringe, or medicine cup  · Missed dose: Take a dose as soon as you remember  If it is almost time for your next dose, wait until then and take a regular dose  Do not take extra medicine to make up for a missed dose  · Some brands must be stored in the refrigerator, and some other brands must be stored at room temperature  Follow the directions on the label  Ask your pharmacist if you are not sure how to store your medicine    Drugs and Foods to Avoid:      Ask your doctor or pharmacist before using any other medicine, including over-the-counter medicines, vitamins, and herbal products  Warnings While Using This Medicine:   · Different brands of this medicine will have different warnings, because the specific ingredients will be different  Read the label on your medicine carefully  Ask your doctor or pharmacist if you have questions  · Tell your doctor if you are pregnant or breastfeeding, or if you are allergic to milk, lactose, yeast, or soy  · Ask your doctor before you use this medicine if you have a central line (port or catheter), or if you have a fever  · Keep all medicine out of the reach of children  Never share your medicine with anyone  Possible Side Effects While Using This Medicine:   Call your doctor right away if you notice any of these side effects:  · Allergic reaction: Itching or hives, swelling in your face or hands, swelling or tingling in your mouth or throat, chest tightness, trouble breathing  If you notice these less serious side effects, talk with your doctor:   · Mild diarrhea, constipation, nausea, or vomiting  · Mild gas or cramps  If you notice other side effects that you think are caused by this medicine, tell your doctor  Call your doctor for medical advice about side effects  You may report side effects to FDA at 5-147-FDA-4234  © Copyright [facility] 2021 Information is for End User's use only and may not be sold, redistributed or otherwise used for commercial purposes  The above information is an  only  It is not intended as medical advice for individual conditions or treatments  Talk to your doctor, nurse or pharmacist before following any medical regimen to see if it is safe and effective for you

## 2021-06-10 NOTE — PROGRESS NOTES
[facility] GASTROENTEROLOGY   ASSESSMENT/PLAN:  Diagnoses and all orders for this visit:    Gastroesophageal reflux disease without esophagitis  Patient with longstanding history of GERD with most recent endoscopy performed on 08/06/2019 demonstrating short-segment Saucedo's  She continues to have ongoing symptoms however she describes her symptoms more so as gassiness and choking while drinking liquids  Patient has had barium swallow completed 06/24/2020 which demonstrated small size hiatal hernia  She also had manometry testing and 24 hour pH testing on 10/26/2020 further demonstrating a hiatal hernia with rapid swallow index of <0 8 and DeMeester score of 7 3  Overall acid exposure time was 2 3%  In the past, patient has had discussions with Dr Kira Leach regarding potential Acie Rock versus transoral incision less fundoplication (TIF) versus surgical options  (Nissen or LINX) due to patient's uncontrolled GERD  However at this time given patient's predominant complaints will continue to recommend dietary and lifestyle modifications before proceeding with any further intervention  · Continue Nexium 40 mg daily  · Begin taking simethicone 180 mg capsule q 6 hours; scripts sent to patient's pharmacy  · Advised to begin taking probiotics  · Educated on the importance of keeping a food diary and instructed to monitor/avoid carbohydrates and sugars  Encouraged to follow low FODMAP diet    Saucedo's esophagus without dysplasia  Most recent endoscopy performed 06/17/2019 demonstrating M1 Saucedo's esophagus with 1 tongue  One Yahoo above the Z-line with no associated nodule  The line 37 cm from the incisors, top of gastric folds 36 cm from the incisors  Biopsies negative with no dysplasia  · Continue Nexium 40 mg daily    Hepatic steatosis  2017 right upper quadrant ultrasound demonstrating hepatomegaly and hepatic steatosis    Follow-up ultrasound elastography completed 11/18/2020 demonstrating with no evidence of fibrosis  Most recent lipid panel completed demonstrates cholesterol 211, triglycerides 206, HDL 37, and   Most recent blood work demonstrating normal LFTs  · Continue to encourage lifestyle modifications including diet and exercise    CHIEF COMPLAINT:  Follow-up for GERD with esophagitis/Saucedo's esophagus    HISTORY OF PRESENT ILLNESS:  Ms Shay Dover is a 68-year-old female with past medical history of GERD with esophagitis and short-segment 1 cm to home Saucedo's esophagus without dysplasia and hepatic steatosis who presents to GI clinic with a complaint of epigastric discomfort along with a choking cessation  Patient states that she has a longstanding history of GERD and is currently prescribed and taking Nexium 40 mg daily  States her symtoms occur weekly as opposed to daily and describes her feeling as a "gassy" sensation as opposed to a burning sensation  She also complains of constipation, bulging, and abdominal bloating and states that she is unaware of these symptoms coincide with her upper GI symptoms  She cannot recall which tend to trigger symptoms or a recurrent pattern of symptoms  She was also previously seen with a complaint of a cocaine cessation  Patient states that she experiences a choking cessation after swallowing liquids and even saliva, but does not experiences this with solids  She feels as though the liquid gets stuck in the back of her throat and is unable to clear it  She denies odynophagia and inability to initiate a swallow  She also further denies any recent unintentional weight loss/gain or nausea/vomitting  The following portions of the patient's history were reviewed and updated as appropriate: allergies, current medications, past family history, past medical history, past social history, past surgical history and problem list     Review of Systems   Constitutional: Negative for activity change, appetite change, chills, fatigue and fever  HENT: Negative for sore throat and trouble swallowing  Respiratory: Positive for choking  Negative for apnea, cough, chest tightness and shortness of breath  Cardiovascular: Negative for chest pain, palpitations and leg swelling  Gastrointestinal: Positive for abdominal distention and constipation  Negative for abdominal pain, anal bleeding, blood in stool, diarrhea, nausea and vomiting  Musculoskeletal: Negative for back pain  Neurological: Negative for dizziness, weakness, light-headedness and numbness  OBJECTIVE:  Vitals:    06/10/21 1531   BP: 137/85   BP Location: Left arm   Patient Position: Sitting   Cuff Size: Adult   Pulse: 74   Temp: 98 6 °F (37 °C)   TempSrc: Tympanic   Weight: 88 5 kg (195 lb)   Height: 5' 3 5" (1 613 m)     Physical Exam  Constitutional:       Appearance: Normal appearance  She is not ill-appearing  HENT:      Head: Normocephalic and atraumatic  Nose: Nose normal  No congestion  Mouth/Throat:      Mouth: Mucous membranes are moist       Pharynx: Oropharynx is clear  No oropharyngeal exudate or posterior oropharyngeal erythema  Comments: No oral thrush or enlarged tonsils  Eyes:      General: No scleral icterus  Conjunctiva/sclera: Conjunctivae normal    Neck:      Musculoskeletal: Normal range of motion  No neck rigidity  Comments: No palpable lymphadenopathy  Cardiovascular:      Rate and Rhythm: Normal rate and regular rhythm  Pulses: Normal pulses  Heart sounds: Normal heart sounds  No murmur  Pulmonary:      Effort: Pulmonary effort is normal  No respiratory distress  Breath sounds: Normal breath sounds  No wheezing  Abdominal:      General: Abdomen is flat  Bowel sounds are normal  There is no distension  Palpations: Abdomen is soft  There is no mass  Tenderness: There is no abdominal tenderness  There is no guarding  Hernia: No hernia is present  Musculoskeletal:      Right lower leg: No edema  Left lower leg: No edema  Skin:     General: Skin is dry  Findings: No bruising  Neurological:      Mental Status: She is alert and oriented to person, place, and time  Motor: No weakness     Psychiatric:         Mood and Affect: Mood normal          Behavior: Behavior normal          Current Outpatient Medications:     acetaminophen (TYLENOL) 500 mg tablet, Take 500 mg by mouth every 6 (six) hours as needed for mild pain, Disp: , Rfl:     ALPRAZolam (XANAX) 0 5 mg tablet, Take 1 tablet (0 5 mg total) by mouth 2 (two) times a day as needed for anxiety or sleep, Disp: 30 tablet, Rfl: 0    Ascorbic Acid (vitamin C) 1000 MG tablet, Take 1,000 mg by mouth daily, Disp: , Rfl:     cetirizine (ZyrTEC) 10 mg tablet, Take 10 mg by mouth daily, Disp: , Rfl:     Cholecalciferol (VITAMIN D3 PO), Take by mouth daily, Disp: , Rfl:     esomeprazole (NexIUM) 40 MG capsule, TAKE ONE CAPSULE BY MOUTH EVERY DAY, Disp: 30 capsule, Rfl: 6    zaleplon (SONATA) 10 MG capsule, TAKE ONE CAPSULE BY MOUTH AT BEDTIME AS NEEDED FOR INSOMNIA, Disp: 30 capsule, Rfl: 0    simethicone (MYLICON,GAS-X) 827 MG capsule, Take 1 capsule (180 mg total) by mouth every 6 (six) hours as needed for flatulence, Disp: 30 capsule, Rfl: 1    Past Medical History:   Diagnosis Date    Abdominal pain     Anxiety     Back pain     Back pain     Saucedo's esophagus     Bilateral ovarian cysts     Cyst of buttocks     Diverticulosis     GERD (gastroesophageal reflux disease)     Hepatic steatosis     Hyperlipidemia     IBS (irritable bowel syndrome)     IBS (irritable bowel syndrome)     Liver disease     fatty liver    Neuroma     Pink eye disease of both eyes     Chronic and was recently treated    Seasonal allergies     Squamous cell carcinoma     Neck     Past Surgical History:   Procedure Laterality Date    COLONOSCOPY  06/02/2017    ESOPHAGOGASTRODUODENOSCOPY      LIPOMA RESECTION N/A 8/30/2017    Procedure: EXCISION GLUTEAL MASS;  Surgeon: Sheela Frost MD;  Location: [facility];  Service: General    MOHS SURGERY Left     neck    NERVE REPAIR Right 11/17/2017    Procedure: 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 (FROZEN SECTION); Surgeon: Marlene Mendoza MD;  [facility];  Service: Neurosurgery    [place] COLONOSCOPY FLX DX W/COLLJ [facility] WHEN PFRMD N/A 6/2/2017    Procedure: EGD AND COLONOSCOPY;  Surgeon: Jeremy Wilburn MD;  [facility];   Service: Gastroenterology    UPPER GASTROINTESTINAL ENDOSCOPY  08/06/2019     Social History     Socioeconomic History    Marital status: /Civil Union     Spouse name: Not on file    Number of children: 2    Years of education: Not on file    Highest education level: Not on file   Occupational History    Not on file   Social Needs    Financial resource strain: Not on file    Food insecurity     Worry: Not on file     Inability: Not on file   [employer] needs     Medical: Not on file     Non-medical: Not on file   Tobacco Use    Smoking status: Never Smoker    Smokeless tobacco: Never Used   Substance and Sexual Activity    Alcohol use: Yes     Comment: 5 monthly; socially    Drug use: No    Sexual activity: Not on file   Lifestyle    Physical activity     Days per week: Not on file     Minutes per session: Not on file    Stress: Not on file   Relationships    Social connections     Talks on phone: Not on file     Gets together: Not on file     Attends [employer] service: Not on file     Active member of club or organization: Not on file     Attends meetings of clubs or organizations: Not on file     Relationship status: Not on file    Intimate partner violence     Fear of current or ex partner: Not on file     Emotionally abused: Not on file     Physically abused: Not on file     Forced sexual activity: Not on file   Other Topics Concern    Not on file   Social History Narrative    Lives with spouse     Family History   Problem Relation Age of Onset    Cancer Father     Colon cancer Father     Dementia Father     Colon polyps Mother     Thyroid disease Mother     No Known Problems Daughter     No Known Problems Maternal Grandmother     No Known Problems Maternal Grandfather     No Known Problems Paternal Grandmother     No Known Problems Paternal Grandfather        ==  DO Grisel Bailon 73 Internal Medicine PGY-1    [address] , [address] [address] [facility], [address] [facility]  Office: [phone]  Fax: [phone]

## 2021-06-16 DIAGNOSIS — K21.9 CHRONIC GERD: ICD-10-CM

## 2021-06-16 RX ORDER — ESOMEPRAZOLE MAGNESIUM 40 MG/1
CAPSULE, DELAYED RELEASE ORAL
Qty: 30 CAPSULE | Refills: 6 | Status: SHIPPED | OUTPATIENT
Start: 2021-06-16 | End: 2022-01-12

## 2021-06-26 DIAGNOSIS — G47.09 OTHER INSOMNIA: ICD-10-CM

## 2021-06-27 RX ORDER — ZALEPLON 10 MG/1
CAPSULE ORAL
Qty: 30 CAPSULE | Refills: 2 | Status: SHIPPED | OUTPATIENT
Start: 2021-06-27 | End: 2021-10-22

## 2021-09-27 NOTE — MISCELLANEOUS
Message  GI Reminder Recall [facility]:   Date: 12/26/2017   Dear Luwanna Mohs SCHNEIDER:     Review of our records shows you are due for the following: Follow Up Visit  Please call the following office to schedule your appointment:   [address] [place] [address], [address] [place] [address] [phone]  We look forward to hearing from you! Sincerely,     [facility]      Signatures   Electronically signed by :  Alva Carmona, ; Dec 26 2017  1:22PM EST                       (Author) Yes

## 2021-10-20 DIAGNOSIS — G47.09 OTHER INSOMNIA: ICD-10-CM

## 2021-10-22 RX ORDER — ZALEPLON 10 MG/1
CAPSULE ORAL
Qty: 30 CAPSULE | Refills: 5 | Status: SHIPPED | OUTPATIENT
Start: 2021-10-22 | End: 2022-05-16

## 2021-12-02 ENCOUNTER — OFFICE VISIT (OUTPATIENT)
Dept: OBGYN CLINIC | Facility: CLINIC | Age: 56
End: 2021-12-02
Payer: COMMERCIAL

## 2021-12-02 ENCOUNTER — APPOINTMENT (OUTPATIENT)
Dept: RADIOLOGY | Facility: CLINIC | Age: 56
End: 2021-12-02
Payer: COMMERCIAL

## 2021-12-02 VITALS
DIASTOLIC BLOOD PRESSURE: 85 MMHG | SYSTOLIC BLOOD PRESSURE: 130 MMHG | BODY MASS INDEX: 33.73 KG/M2 | WEIGHT: 197.6 LBS | HEIGHT: 64 IN

## 2021-12-02 DIAGNOSIS — M25.571 RIGHT ANKLE PAIN, UNSPECIFIED CHRONICITY: ICD-10-CM

## 2021-12-02 DIAGNOSIS — G89.29 CHRONIC PAIN OF RIGHT ANKLE: Primary | ICD-10-CM

## 2021-12-02 DIAGNOSIS — M25.571 CHRONIC PAIN OF RIGHT ANKLE: Primary | ICD-10-CM

## 2021-12-02 PROCEDURE — 73610 X-RAY EXAM OF ANKLE: CPT

## 2021-12-02 PROCEDURE — 99204 OFFICE O/P NEW MOD 45 MIN: CPT | Performed by: ORTHOPAEDIC SURGERY

## 2021-12-14 ENCOUNTER — HOSPITAL ENCOUNTER (OUTPATIENT)
Dept: MRI IMAGING | Facility: HOSPITAL | Age: 56
Discharge: HOME | End: 2021-12-14
Attending: ORTHOPAEDIC SURGERY
Payer: COMMERCIAL

## 2021-12-14 DIAGNOSIS — G89.29 CHRONIC PAIN OF RIGHT ANKLE: ICD-10-CM

## 2021-12-14 DIAGNOSIS — M25.571 CHRONIC PAIN OF RIGHT ANKLE: ICD-10-CM

## 2021-12-14 PROCEDURE — 73721 MRI JNT OF LWR EXTRE W/O DYE: CPT

## 2021-12-22 ENCOUNTER — OFFICE VISIT (OUTPATIENT)
Dept: OBGYN CLINIC | Facility: CLINIC | Age: 56
End: 2021-12-22
Payer: COMMERCIAL

## 2021-12-22 VITALS
HEIGHT: 64 IN | DIASTOLIC BLOOD PRESSURE: 86 MMHG | WEIGHT: 193 LBS | BODY MASS INDEX: 32.95 KG/M2 | SYSTOLIC BLOOD PRESSURE: 124 MMHG

## 2021-12-22 DIAGNOSIS — M93.20 OSTEOCHONDRITIS DISSECANS: Primary | ICD-10-CM

## 2021-12-22 PROCEDURE — 1036F TOBACCO NON-USER: CPT | Performed by: ORTHOPAEDIC SURGERY

## 2021-12-22 PROCEDURE — 3008F BODY MASS INDEX DOCD: CPT | Performed by: ORTHOPAEDIC SURGERY

## 2021-12-22 PROCEDURE — 99213 OFFICE O/P EST LOW 20 MIN: CPT | Performed by: ORTHOPAEDIC SURGERY

## 2022-01-07 ENCOUNTER — OFFICE VISIT (OUTPATIENT)
Dept: OBGYN CLINIC | Facility: CLINIC | Age: 57
End: 2022-01-07
Payer: COMMERCIAL

## 2022-01-07 VITALS
BODY MASS INDEX: 33.29 KG/M2 | DIASTOLIC BLOOD PRESSURE: 82 MMHG | WEIGHT: 195 LBS | SYSTOLIC BLOOD PRESSURE: 126 MMHG | HEIGHT: 64 IN

## 2022-01-07 DIAGNOSIS — M94.9 OSTEOCHONDRAL LESION OF TALAR DOME: ICD-10-CM

## 2022-01-07 DIAGNOSIS — M89.9 OSTEOCHONDRAL LESION OF TALAR DOME: ICD-10-CM

## 2022-01-07 PROCEDURE — 1036F TOBACCO NON-USER: CPT | Performed by: ORTHOPAEDIC SURGERY

## 2022-01-07 PROCEDURE — 99214 OFFICE O/P EST MOD 30 MIN: CPT | Performed by: ORTHOPAEDIC SURGERY

## 2022-01-07 PROCEDURE — 3008F BODY MASS INDEX DOCD: CPT | Performed by: ORTHOPAEDIC SURGERY

## 2022-01-07 NOTE — PROGRESS NOTES
FLORENCIO Pandey  Attending, Orthopaedic Surgery  [facility] [address] [facility]        ORTHOPAEDIC FOOT AND ANKLE CLINIC VISIT     Assessment:     Encounter Diagnosis   Name Primary?  Osteochondral lesion of talar dome               Plan:   · The patient verbalized understanding of exam findings and treatment plan  We engaged in the shared decision-making process and treatment options were discussed at length with the patient  Surgical and conservative management discussed today along with risks and benefits  · Andree has ankle weakness with recurrent sprains and a central talar OCD on MRI  · Her MRI shows bone edema which would correlate with her vague anterior ankle pain  · She has a painful click with anterior drawer but no laxity  She would benefit from PT for strengthening and proprioception/gait training   · She will modify activities and wear supportive shoes  · We will see her back in 2 months to assess response and if not improving she may be a candidate for ankle scope, retrograde drilling and bioplasty procedure  Return in about 2 months (around 3/7/2022)  History of Present Illness:   Chief Complaint: Right ankle pain  Alli Hamilton is a 61 y o  female who is being seen for right ankle pain and instability  She reports a long history of ankle "rolling" but over the last 3 months since October she has had more pain in the ankle  The pain migrates across the anterior ankle and is aching and moderate  Patient denies numbness, tingling or radicular pain  Denies history of neuropathy  Patient does not smoke, does not have diabetes and does not take blood thinners  Patient denies family history of anesthesia complications and has not had any complications with anesthesia       Pain/symptom timing:  Worse during the day when active  Pain/symptom context:  Worse with activites and work  Pain/symptom modifying factors:  Rest makes better, activities make worse  Pain/symptom associated signs/symptoms: none    Prior treatment   · NSAIDsYes    · Injections No   · Bracing/Orthotics No   · Physical Therapy No     Orthopedic Surgical History:   None    Past Medical, Surgical and Social History:  Past Medical History:  has a past medical history of Abdominal pain, Anxiety, Back pain, Back pain, Saucedo's esophagus, Bilateral ovarian cysts, Cyst of buttocks, Diverticulosis, GERD (gastroesophageal reflux disease), Hepatic steatosis, Hyperlipidemia, IBS (irritable bowel syndrome), IBS (irritable bowel syndrome), Liver disease, Neuroma, Pink eye disease of both eyes, Seasonal allergies, and Squamous cell carcinoma  Problem List: does not have any pertinent problems on file  Past Surgical History:  has a past surgical history that includes Mohs surgery (Left); pr colonoscopy flx dx w/collj spec when pfrmd (N/A, 6/2/2017); Esophagogastroduodenoscopy; Lipoma resection (N/A, 8/30/2017); Nerve repair (Right, 11/17/2017); Colonoscopy (06/02/2017); and Upper gastrointestinal endoscopy (08/06/2019)  Family History: family history includes Cancer in her father; Colon cancer in her father; Colon polyps in her mother; Dementia in her father; No Known Problems in her daughter, maternal grandfather, maternal grandmother, paternal grandfather, and paternal grandmother; Thyroid disease in her mother  Social History:  reports that she has never smoked  She has never used smokeless tobacco  She reports current alcohol use  She reports that she does not use drugs  Current Medications: has a current medication list which includes the following prescription(s): acetaminophen, alprazolam, vitamin c, cetirizine, cholecalciferol, esomeprazole, zaleplon, and simethicone  Allergies: is allergic to pollen extract and sulfa antibiotics       Review of Systems:  General- denies fever/chills  HEENT- denies hearing loss or sore throat  Eyes- denies eye pain or visual disturbances, denies red eyes  Respiratory- denies cough or SOB  Cardio- denies chest pain or palpitations  GI- denies abdominal pain  Endocrine- denies urinary frequency  Urinary- denies pain with urination  Musculoskeletal- Negative except noted above  Skin- denies rashes or wounds  Neurological- denies dizziness or headache  Psychiatric- denies anxiety or difficulty concentrating    Physical Exam:   /82   Ht 5' 4" (1 626 m)   Wt 88 5 kg (195 lb)   BMI 33 47 kg/m²   General/Constitutional: No apparent distress: well-nourished and well developed  Eyes: normal ocular motion  Cardio: RRR, Normal S1S2, No m/r/g  Lymphatic: No appreciable lymphadenopathy  Respiratory: Non-labored breathing, CTA b/l no w/c/r  Vascular: No edema, swelling or tenderness, except as noted in detailed exam   Integumentary: No impressive skin lesions present, except as noted in detailed exam   Neuro: No ataxia or tremors noted  Psych: Normal mood and affect, oriented to person, place and time  Appropriate affect  Musculoskeletal: Normal, except as noted in detailed exam and in HPI  Examination    Right    Gait Normal   Musculoskeletal Tender to palpation at anterior ankle    Skin Normal       Nails Normal    Range of Motion  15 degrees dorsiflexion, 40 degrees plantarflexion  Subtalar motion: none    Stability Stable    Muscle Strength 5/5 tibialis anterior  5/5 gastrocnemius-soleus  4/5 posterior tibialis  4/5 peroneal/eversion strength  5/5 EHL  5/5 FHL    Neurologic Normal    Sensation  Intact to light touch throughout sural, saphenous, superficial peroneal, deep peroneal and medial/lateral plantar nerve distributions  [place] 5 07 filament (10g) testing was deferred  Cardiovascular Brisk capillary refill < 2 seconds,intact DP and PT pulses    Special Tests Anterior drawer- no laxity but there is a painful click      Imaging Studies:   MRI of the right ankle available demonstrates a talar dome central OCD lesion with moderate bone edema  XR 3 views of the right ankle were available demonstrate irregularity in the central talar dome  Scribe Attestation    I,:  Connie Mendez PA-C am acting as a scribe while in the presence of the attending physician :       I,:  Snehal Villar MD personally performed the services described in this documentation    as scribed in my presence :           Newt Adas Lachman, MD  Foot & Ankle Surgery   Department of [address] [place] [address]      I personally performed the service  Newt Adas Lachman, MD

## 2022-01-07 NOTE — PATIENT INSTRUCTIONS
She has a cental talar dome lesion called an osteochondral lesion with edema in the bone  The cartilage over the defect seems to have filled in  Central lesions may heal with PT and activity modification and supportive sneaker wear  If not improving with 6 weeks of PT, may be a candidate for a surgery to decompress the edema  Danny Suero, Brittney are good brands but I recommend going to a dedicate shoe store (not [employer] or [employer] ) At these types of stores, they have experts that can fit you for shoes appropriate for your foot problem  [employer]  [address] [place] [facility] [address], [address]    [facility], [address] [place], [address] [place] [address]    [employer] [employer]   [address]   [facility], [address] [facility] [facility]  [address] [facility], [address] [place] [address]     [employer]  [address] [facility] [address], [place], [address] [place] [address] [address] [facility], [address] [place] [address]    [employer]  [address], [place], [place]

## 2022-01-08 ENCOUNTER — IMMUNIZATIONS (OUTPATIENT)
Dept: FAMILY MEDICINE CLINIC | Facility: HOSPITAL | Age: 57
End: 2022-01-08

## 2022-01-08 DIAGNOSIS — Z23 ENCOUNTER FOR IMMUNIZATION: Primary | ICD-10-CM

## 2022-01-08 PROCEDURE — 91300 COVID-19 PFIZER VACC 0.3 ML: CPT

## 2022-01-08 PROCEDURE — 0001A COVID-19 PFIZER VACC 0.3 ML: CPT

## 2022-01-12 DIAGNOSIS — K21.9 CHRONIC GERD: ICD-10-CM

## 2022-01-12 RX ORDER — ESOMEPRAZOLE MAGNESIUM 40 MG/1
CAPSULE, DELAYED RELEASE ORAL
Qty: 30 CAPSULE | Refills: 6 | Status: SHIPPED | OUTPATIENT
Start: 2022-01-12 | End: 2022-01-17

## 2022-01-17 DIAGNOSIS — K21.9 CHRONIC GERD: ICD-10-CM

## 2022-01-17 RX ORDER — ESOMEPRAZOLE MAGNESIUM 40 MG/1
CAPSULE, DELAYED RELEASE ORAL
Qty: 30 CAPSULE | Refills: 6 | Status: SHIPPED | OUTPATIENT
Start: 2022-01-17

## 2022-01-25 ENCOUNTER — EVALUATION (OUTPATIENT)
Dept: PHYSICAL THERAPY | Facility: CLINIC | Age: 57
End: 2022-01-25
Payer: COMMERCIAL

## 2022-01-25 DIAGNOSIS — G89.29 CHRONIC PAIN OF RIGHT ANKLE: Primary | ICD-10-CM

## 2022-01-25 DIAGNOSIS — M25.571 CHRONIC PAIN OF RIGHT ANKLE: Primary | ICD-10-CM

## 2022-01-25 DIAGNOSIS — M89.9 OSTEOCHONDRAL LESION OF TALAR DOME: ICD-10-CM

## 2022-01-25 DIAGNOSIS — M94.9 OSTEOCHONDRAL LESION OF TALAR DOME: ICD-10-CM

## 2022-01-25 PROCEDURE — 97162 PT EVAL MOD COMPLEX 30 MIN: CPT | Performed by: PHYSICAL THERAPIST

## 2022-01-25 NOTE — PROGRESS NOTES
PT Evaluation     Today's date: 2022  Patient name: Nadeen Fajardo  : 1965  MRN: [id number]  Referring provider: Pastor Mart PA-C  Dx:   Encounter Diagnosis     ICD-10-CM    1  Chronic pain of right ankle  M25 571     G89 29    2  Osteochondral lesion of talar dome  M89 9 Ambulatory referral to Physical Therapy    M94 9                   Assessment  Assessment details: Pt is a 61y o  year old female coming to outpatient PT with a diagnosis of R ankle pain, recurrent sprains and osteochondral dome lesion  Pt presents with increased pain and TTP, decreased R ankle df and pf ROM, decreased strength, increased edema, and overall decreased functional mobility  Pt would benefit from skilled PT services in order to address these deficits and reach maximum level of function  Thank you kindly for the referral!  Impairments: abnormal or restricted ROM, activity intolerance, impaired physical strength, lacks appropriate home exercise program, pain with function and weight-bearing intolerance  Understanding of Dx/Px/POC: good   Prognosis: good    Goals  STG's ( 3-4 weeks)  1  Pt will be independent in HEP  2  Pt will have decreased pain at worst rated 5/10  LTG's ( 6- 8 weeks)  1  Improve FOTO score by 8-10 points  2  Pt will have improved R ankle df ROM to WFL's  3  Pt will have improved R ankle strength by 1/2 grade  4  Pt will be able to ambulate community distances with less pain  5  Pt will have less pain with driving activities  6   Pt will have less pain going up and down the steps    Plan  Patient would benefit from: PT eval and skilled physical therapy  Planned modality interventions: cryotherapy  Planned therapy interventions: manual therapy, joint mobilization, neuromuscular re-education, balance, home exercise program, functional ROM exercises, flexibility, stretching, strengthening and therapeutic exercise  Frequency: 2x week  Duration in weeks: 6  Plan of Care beginning date: 2022  Plan of Care expiration date: 3/8/2022  Treatment plan discussed with: patient        Subjective Evaluation    History of Present Illness  Mechanism of injury: Pt reports a history of recurrent R ankle sprains over the last several years  10/2021, her pain has worsened and it has not gone away  In the past, pt has applied ice and wrapped her ankle and the pain has resolved  Pt needs to be careful with the shoes that she wears and holds onto the railing when going up and down the steps  Pt sits all day at work  Pt has increased pain with driving and puts her foot up on a stool at work  Pt has increased pain when going up and down the steps when carrying a laundry basket, walking long distances, and prolonged standing activities in the kitchen      Work: desk work  Hobbies: reading, shopping  Gait: mild antalgic gait, decreased df at heel strike    Pain  Current pain ratin  At best pain ratin  At worst pain ratin  Location: R ankle  Quality: dull ache and throbbing  Relieving factors: ice and rest    Social Support  Steps to enter house: yes  3  Stairs in house: yes   13  Lives in: multiple-level home    Employment status: working    Diagnostic Tests  MRI studies: abnormal  Patient Goals  Patient goals for therapy: decreased pain  Patient goal: to be able to walk, stand and drive with less R ankle pain        Objective     Neurological Testing     Sensation     Ankle/Foot   Left Ankle/Foot   Intact: light touch    Right Ankle/Foot   Intact: light touch     Reflexes   Left   Patellar (L4): normal (2+)  Achilles (S1): normal (2+)    Right   Patellar (L4): normal (2+)  Achilles (S1): normal (2+)    Active Range of Motion   Left Ankle/Foot   Normal active range of motion    Right Ankle/Foot   Dorsiflexion (ke): 0 degrees   Plantar flexion: 65 degrees   Inversion: WFL  Eversion: 15 degrees     Additional Active Range of Motion Details  B hip and knee strength: WFL's  (-) anterior drawer test  (-) varus/ valgus test  Moderate edema R ankle  (+) TTP medial and lateral ankle; anterior tibialis and dorsum of foot  SLS: R: 20 sec pain, LOB  L: 30 seconds    Passive Range of Motion     Right Ankle/Foot    Dorsiflexion (ke): 2 degrees   Plantar flexion: 70 degrees   Inversion: WFL  Eversion: 20 degrees     Strength/Myotome Testing     Left Ankle/Foot   Normal strength    Right Ankle/Foot   Dorsiflexion: 4+  Plantar flexion: 3+  Inversion: 4+  Eversion: 4+            Dx: R ankle pain/ osteochondral lesion  EPOC: 3/8/22  CO-MORBIDITIES:  PERSONAL FACTORS:  Precautions: none      Manuals 1/25            R ankle manual stretching             R ankle AP joint mobs             K tape stirrup th                         Neuro Re-Ed             Sidestepping on foam             Mini squats on airex             SLS R                                                                 Ther Ex             HEP instruct 5'            bike             HR             R gastroc stretch             1/2 kneeling stretch over 2nd MT             Seated BAPS board             4 way ankle TB ex                          Ther Activity                                       Gait Training                                       Modalities

## 2022-02-01 ENCOUNTER — APPOINTMENT (OUTPATIENT)
Dept: PHYSICAL THERAPY | Facility: CLINIC | Age: 57
End: 2022-02-01
Payer: COMMERCIAL

## 2022-02-08 ENCOUNTER — OFFICE VISIT (OUTPATIENT)
Dept: PHYSICAL THERAPY | Facility: CLINIC | Age: 57
End: 2022-02-08
Payer: COMMERCIAL

## 2022-02-08 DIAGNOSIS — M89.9 OSTEOCHONDRAL LESION OF TALAR DOME: ICD-10-CM

## 2022-02-08 DIAGNOSIS — M94.9 OSTEOCHONDRAL LESION OF TALAR DOME: ICD-10-CM

## 2022-02-08 DIAGNOSIS — G89.29 CHRONIC PAIN OF RIGHT ANKLE: Primary | ICD-10-CM

## 2022-02-08 DIAGNOSIS — M25.571 CHRONIC PAIN OF RIGHT ANKLE: Primary | ICD-10-CM

## 2022-02-08 PROCEDURE — 97110 THERAPEUTIC EXERCISES: CPT

## 2022-02-08 PROCEDURE — 97140 MANUAL THERAPY 1/> REGIONS: CPT

## 2022-02-08 PROCEDURE — 97112 NEUROMUSCULAR REEDUCATION: CPT

## 2022-02-08 NOTE — PROGRESS NOTES
Daily Note     Today's date: 2022  Patient name: Cortez Brown  : 1965  MRN: [id number]  Referring provider: Mackenzie Villar PA-C  Dx:   Encounter Diagnosis     ICD-10-CM    1  Chronic pain of right ankle  M25 571     G89 29    2  Osteochondral lesion of talar dome  M89 9     M94 9                   Subjective: Pt reports having a fall on black ice last Wed, almost 1 wk  Reports falling legs going in different directions and having increased pain and edema in the ankle  Objective: See treatment diary below      Assessment: Tolerated treatment well  Patient's pain monitored t/o session  Pt had no c/o's w/ ex's  Pt has large pocket of edema at lat [place]  Plan: Continue per plan of care  Progress treatment as tolerated         Dx: R ankle pain/ osteochondral lesion  EPOC: 3/8/22  CO-MORBIDITIES:  PERSONAL FACTORS:  Precautions: none      Manuals            R ankle manual stretching  JK           R ankle AP joint mobs             K tape stirrup th JK             9'           Neuro Re-Ed             Sidestepping on foamnv             Mini squats on airex  10x5"           SLS R  nv                                                               Ther Ex             HEP instruct 5'            bike  5'           HR  10           R gastroc stretch  3x20"           1/2 kneeling stretch over 2nd MT  10x5"           Seated BAPS board  First bd 10x           4 way ankle TB ex  OTB 15x ea                        Ther Activity                                       Gait Training                                       Modalities

## 2022-02-15 ENCOUNTER — OFFICE VISIT (OUTPATIENT)
Dept: PHYSICAL THERAPY | Facility: CLINIC | Age: 57
End: 2022-02-15
Payer: COMMERCIAL

## 2022-02-15 DIAGNOSIS — M94.9 OSTEOCHONDRAL LESION OF TALAR DOME: ICD-10-CM

## 2022-02-15 DIAGNOSIS — M89.9 OSTEOCHONDRAL LESION OF TALAR DOME: ICD-10-CM

## 2022-02-15 DIAGNOSIS — G89.29 CHRONIC PAIN OF RIGHT ANKLE: Primary | ICD-10-CM

## 2022-02-15 DIAGNOSIS — M25.571 CHRONIC PAIN OF RIGHT ANKLE: Primary | ICD-10-CM

## 2022-02-15 PROCEDURE — 97140 MANUAL THERAPY 1/> REGIONS: CPT

## 2022-02-15 PROCEDURE — 97112 NEUROMUSCULAR REEDUCATION: CPT

## 2022-02-15 PROCEDURE — 97110 THERAPEUTIC EXERCISES: CPT

## 2022-02-15 NOTE — PROGRESS NOTES
Daily Note     Today's date: 2/15/2022  Patient name: Liban Heller  : 1965  MRN: [id number]  Referring provider: Kenneth Swanson PA-C  Dx:   Encounter Diagnosis     ICD-10-CM    1  Chronic pain of right ankle  M25 571     G89 29    2  Osteochondral lesion of talar dome  M89 9     M94 9                   Subjective: Pt reports feeling sore after LV but that the tape has been helping  She feels stiff today, and overall no improvement noted    Objective: See treatment diary below      Assessment: Tolerated treatment well  She notes slight improvement in performance of TE's given today  Shows good ankle mobility- but pinching sensation on front of ankle when stretching into dorsiflexion  Trialed some light ankle distraction and pinching improved  Benefits from stability of K-tape  Plan: Continue per plan of care  Progress treatment as tolerated         Dx: R ankle pain/ osteochondral lesion  EPOC: 3/8/22  CO-MORBIDITIES:  PERSONAL FACTORS:  Precautions: none      Manuals 1/25 2/8 2/15          R ankle manual stretching  JK WE          R ankle AP joint mobs             K tape stirrup th JK WE            9' 10'          Neuro Re-Ed             Sidestepping on foamnv             Mini squats on airex  10x5" 5"x10          SLS R  nv 10"x5                                                              Ther Ex             HEP instruct 5'            bike  5' 5'          HR  10           R gastroc stretch  3x20" 20"x3 ea          1/2 kneeling stretch over 2nd MT  10x5" 5"x10          Seated BAPS board  First bd 10x First bd 10x ea          4 way ankle TB ex  OTB 15x ea OTB 15x ea                       Ther Activity                                       Gait Training                                       Modalities

## 2022-02-22 ENCOUNTER — OFFICE VISIT (OUTPATIENT)
Dept: PHYSICAL THERAPY | Facility: CLINIC | Age: 57
End: 2022-02-22
Payer: COMMERCIAL

## 2022-02-22 DIAGNOSIS — G89.29 CHRONIC PAIN OF RIGHT ANKLE: Primary | ICD-10-CM

## 2022-02-22 DIAGNOSIS — M25.571 CHRONIC PAIN OF RIGHT ANKLE: Primary | ICD-10-CM

## 2022-02-22 PROCEDURE — 97110 THERAPEUTIC EXERCISES: CPT

## 2022-02-22 PROCEDURE — 97112 NEUROMUSCULAR REEDUCATION: CPT

## 2022-02-22 PROCEDURE — 97140 MANUAL THERAPY 1/> REGIONS: CPT

## 2022-02-22 NOTE — PROGRESS NOTES
Daily Note     Today's date: 2022  Patient name: Melissa Hanna  : 1965  MRN: [id number]  Referring provider: Stevan Luevano PA-C  Dx:   Encounter Diagnosis     ICD-10-CM    1  Chronic pain of right ankle  M25 571     G89 29                   Subjective: Pt reports she has been doing better oveall but had some more pain today for some reason/    Objective: See treatment diary below      Assessment: Tolerated treatment well  Although she noted a little more pain today, her ankle was moving well and showed improved performance of the TE's  Post manual therapy she noted less pain with walking  Would benefit from continued skilled therapy  D/c K-tape as she seemed to have a slight skin reaction to tape from LV  Plan: Continue per plan of care  Progress treatment as tolerated         Dx: R ankle pain/ osteochondral lesion  EPOC: 3/8/22  CO-MORBIDITIES:  PERSONAL FACTORS:  Precautions: none      Manuals 1/25 2/8 2/15 2/22         R ankle manual stretching  JK WE WE         R ankle AP joint mobs             K tape stirrup th JK WE D/c           9' 10' 10'         Neuro Re-Ed             Sidestepping on foamnv             Mini squats on airex  10x5" 5"x10 5"x15         SLS R  nv 10"x5 Grn  Foam  10"x10                                                             Ther Ex             HEP instruct 5'            bike  5' 5' 5'         HR  10  Slant   L2  x10         R gastroc stretch  3x20" 20"x3 ea 20"x5         1/2 kneeling stretch over 2nd MT  10x5" 5"x10 20"x3         Seated BAPS board  First bd 10x First bd 10x ea First bd 10x ea         4 way ankle TB ex  OTB 15x ea OTB 15x ea OTB 15x ea                      Ther Activity                                       Gait Training                                       Modalities

## 2022-03-01 ENCOUNTER — EVALUATION (OUTPATIENT)
Dept: PHYSICAL THERAPY | Facility: CLINIC | Age: 57
End: 2022-03-01
Payer: COMMERCIAL

## 2022-03-01 DIAGNOSIS — M89.9 OSTEOCHONDRAL LESION OF TALAR DOME: ICD-10-CM

## 2022-03-01 DIAGNOSIS — M25.571 CHRONIC PAIN OF RIGHT ANKLE: Primary | ICD-10-CM

## 2022-03-01 DIAGNOSIS — G89.29 CHRONIC PAIN OF RIGHT ANKLE: Primary | ICD-10-CM

## 2022-03-01 DIAGNOSIS — M94.9 OSTEOCHONDRAL LESION OF TALAR DOME: ICD-10-CM

## 2022-03-01 PROCEDURE — 97112 NEUROMUSCULAR REEDUCATION: CPT | Performed by: PHYSICAL THERAPIST

## 2022-03-01 PROCEDURE — 97110 THERAPEUTIC EXERCISES: CPT | Performed by: PHYSICAL THERAPIST

## 2022-03-01 PROCEDURE — 97140 MANUAL THERAPY 1/> REGIONS: CPT | Performed by: PHYSICAL THERAPIST

## 2022-03-01 NOTE — PROGRESS NOTES
PT Re-Evaluation     Today's date: 3/1/2022  Patient name: Alli Hamilton  : 1965  MRN: [id number]  Referring provider: Kingsley Nayak PA-C  Dx:   Encounter Diagnosis     ICD-10-CM    1  Chronic pain of right ankle  M25 571     G89 29    2  Osteochondral lesion of talar dome  M89 9     M94 9                   Assessment  Assessment details: Since starting skilled PT, pain levels are decreased, R ankle ROM and strength have improved with good functional progress  Recommend pt continue skilled PT focusing on strengthening ex and decreasing df pain  Impairments: impaired physical strength and pain with function  Understanding of Dx/Px/POC: good   Prognosis: good    Goals  STG's ( 3-4 weeks)  1  Pt will be independent in HEP-met  2  Pt will have decreased pain at worst rated 5/10-met  LTG's ( 6- 8 weeks)  1  Improve FOTO score by 8-10 points-met  2  Pt will have improved R ankle df ROM to WFL's-partial met  3  Pt will have improved R ankle strength by 1/2 grade- partial met  4  Pt will be able to ambulate community distances with less pain-partial met  5  Pt will have less pain with driving activities-met  6  Pt will have less pain going up and down the steps-met     Plan  Patient would benefit from: skilled physical therapy  Planned modality interventions: cryotherapy  Planned therapy interventions: manual therapy, joint mobilization, neuromuscular re-education, balance, home exercise program, functional ROM exercises, flexibility, stretching, strengthening and therapeutic exercise  Frequency: 2x week  Duration in weeks: 6  Plan of Care beginning date: 3/1/2022  Plan of Care expiration date: 2022  Treatment plan discussed with: patient        Subjective Evaluation    History of Present Illness  Mechanism of injury: I E: Pt reports a history of recurrent R ankle sprains over the last several years  10/2021, her pain has worsened and it has not gone away   In the past, pt has applied ice and wrapped her ankle and the pain has resolved  Pt needs to be careful with the shoes that she wears and holds onto the railing when going up and down the steps  Pt sits all day at work  Pt has increased pain with driving and puts her foot up on a stool at work  Pt has increased pain when going up and down the steps when carrying a laundry basket, walking long distances, and prolonged standing activities in the kitchen  I E: Pt has pain when driving and does not need to put her foot up a stool at work  Pt continues to have swelling in her R ankle with prolonged weight bearing activities when cleaning and cooking  Pt is still cautious with her ankle  Pt has less pain and improved strength when going up the steps and still needs to hold onto a railing  Pt has not been performing long walking activities      Work: desk work  Hobbies: reading, shopping  Gait: mild antalgic gait, decreased df at heel strike    Pain  Current pain ratin  At best pain ratin  At worst pain ratin  Location: R ankle  Quality: dull ache and throbbing  Relieving factors: ice and rest    Social Support  Steps to enter house: yes  3  Stairs in house: yes   13  Lives in: multiple-level home    Employment status: working    Diagnostic Tests  MRI studies: abnormal  Patient Goals  Patient goals for therapy: decreased pain  Patient goal: to be able to walk, stand and drive with less R ankle pain        Objective     Neurological Testing     Sensation     Ankle/Foot   Left Ankle/Foot   Intact: light touch    Right Ankle/Foot   Intact: light touch     Reflexes   Left   Patellar (L4): normal (2+)  Achilles (S1): normal (2+)    Right   Patellar (L4): normal (2+)  Achilles (S1): normal (2+)    Active Range of Motion   Left Ankle/Foot   Normal active range of motion    Right Ankle/Foot   Dorsiflexion (ke): 5 degrees   Plantar flexion: 65 degrees   Inversion: WFL  Eversion: 15 degrees     Additional Active Range of Motion Details  B hip and knee strength: WFL's  (-) anterior drawer test  (-) varus/ valgus test  Moderate edema R ankle  (+) TTP medial and lateral ankle; anterior tibialis and dorsum of foot  SLS: R: 20 sec pain, LOB  L: 30 seconds    Passive Range of Motion     Right Ankle/Foot    Dorsiflexion (ke): 15 degrees   Plantar flexion: 75 degrees   Inversion: WFL  Eversion: 20 degrees     Strength/Myotome Testing     Left Ankle/Foot   Normal strength    Right Ankle/Foot   Dorsiflexion: 4+  Plantar flexion: 3+  Inversion: 5  Eversion: 5         Dx: R ankle pain/ osteochondral lesion  EPOC: 3/8/22  CO-MORBIDITIES:  PERSONAL FACTORS:  Precautions: none      Manuals 1/25 2/8 2/15 2/22 3/1        R ankle manual stretching  JK WE WE th        R ankle AP joint mobs     th        Progress note th JK WE D/c th          9' 10' 10' 15'        Neuro Re-Ed             Sidestepping on foamnv     2 laps        Mini squats on airex  10x5" 5"x10 5"x15 5"x15        SLS R  nv 10"x5 Grn  Foam  10"x10 30"x1        Combo chair: soleus decell     NV        Heel toe inch worm                                       Ther Ex             HEP instruct 5'            bike  5' 5' 5' 6'        HR  10  Slant   L2  x10 slant L2 x10        R gastroc stretch  3x20" 20"x3 ea 20"x5 20"x3        1/2 kneeling stretch over 2nd MT  10x5" 5"x10 20"x3 10x10"        Seated BAPS board  First bd 10x First bd 10x ea First bd 10x ea         4 way ankle TB ex  OTB 15x ea OTB 15x ea OTB 15x ea GTB x15 ea                     Ther Activity                                       Gait Training                                       Modalities

## 2022-03-08 ENCOUNTER — OFFICE VISIT (OUTPATIENT)
Dept: PHYSICAL THERAPY | Facility: CLINIC | Age: 57
End: 2022-03-08
Payer: COMMERCIAL

## 2022-03-08 DIAGNOSIS — M94.9 OSTEOCHONDRAL LESION OF TALAR DOME: ICD-10-CM

## 2022-03-08 DIAGNOSIS — M89.9 OSTEOCHONDRAL LESION OF TALAR DOME: ICD-10-CM

## 2022-03-08 DIAGNOSIS — G89.29 CHRONIC PAIN OF RIGHT ANKLE: Primary | ICD-10-CM

## 2022-03-08 DIAGNOSIS — M25.571 CHRONIC PAIN OF RIGHT ANKLE: Primary | ICD-10-CM

## 2022-03-08 PROCEDURE — 97140 MANUAL THERAPY 1/> REGIONS: CPT

## 2022-03-08 PROCEDURE — 97112 NEUROMUSCULAR REEDUCATION: CPT

## 2022-03-08 PROCEDURE — 97110 THERAPEUTIC EXERCISES: CPT

## 2022-03-08 NOTE — PROGRESS NOTES
Daily Note     Today's date: 3/8/2022  Patient name: Erica Harrison  : 1965  MRN: [id number]  Referring provider: Karen Hdz PA-C  Dx:   Encounter Diagnosis     ICD-10-CM    1  Chronic pain of right ankle  M25 571     G89 29    2  Osteochondral lesion of talar dome  M89 9     M94 9                   Subjective: Pt reports her foot is feeling good  Pt feels she might be coming to an end of PT  Reports good compliance w/ HEP  Objective: See treatment diary below      Assessment: Tolerated treatment well  Patient demonstrated fatigue post treatment and would benefit from continued PT for cont'd foot strengthening  Plan: Cont POC  Progress as tolerated       Dx: R ankle pain/ osteochondral lesion  EPOC: 3/8/22  CO-MORBIDITIES:  PERSONAL FACTORS:  Precautions: none      Manuals 1/25 2/8 2/15 2/22 3/1 3/8       R ankle manual stretching  JK WE WE  JK       R ankle AP joint mobs            Progress note th JK WE D/c th          9' 10' 10' 15' 8'       Neuro Re-Ed             Sidestepping on foamnv     2 laps 2 laps       Mini squats on airex  10x5" 5"x10 5"x15 5"x15 5"x15       SLS R  nv 10"x5 Grn  Foam  10"x10 30"x1 GTF 30"x2       Combo chair: soleus decell     NV        Heel toe inch worm             HR/TR FT side      1 lap                    Ther Ex             HEP instruct 5'            bike  5' 5' 5' 6' 6'       HR  10  Slant   L2  x10 slant L2 x10 slant L2 x20       R gastroc stretch  3x20" 20"x3 ea 20"x5 20"x3 20"x3       1/2 kneeling stretch over 2nd MT  10x5" 5"x10 20"x3 10x10" 10x10"       Seated BAPS board  First bd 10x First bd 10x ea First bd 10x ea         4 way ankle TB ex  OTB 15x ea OTB 15x ea OTB 15x ea GTB x15 ea        Soleus stretch      20"x3       Ther Activity                                       Gait Training                                       Modalities

## 2022-03-15 ENCOUNTER — OFFICE VISIT (OUTPATIENT)
Dept: PHYSICAL THERAPY | Facility: CLINIC | Age: 57
End: 2022-03-15
Payer: COMMERCIAL

## 2022-03-15 DIAGNOSIS — M89.9 OSTEOCHONDRAL LESION OF TALAR DOME: ICD-10-CM

## 2022-03-15 DIAGNOSIS — M25.571 CHRONIC PAIN OF RIGHT ANKLE: Primary | ICD-10-CM

## 2022-03-15 DIAGNOSIS — M94.9 OSTEOCHONDRAL LESION OF TALAR DOME: ICD-10-CM

## 2022-03-15 DIAGNOSIS — G89.29 CHRONIC PAIN OF RIGHT ANKLE: Primary | ICD-10-CM

## 2022-03-15 PROCEDURE — 97110 THERAPEUTIC EXERCISES: CPT

## 2022-03-15 PROCEDURE — 97140 MANUAL THERAPY 1/> REGIONS: CPT

## 2022-03-15 PROCEDURE — 97112 NEUROMUSCULAR REEDUCATION: CPT

## 2022-03-15 NOTE — PROGRESS NOTES
Daily Note     Today's date: 3/15/2022  Patient name: Earl García  : 1965  MRN: [id number]  Referring provider: Kenia Bland PA-C  Dx:   Encounter Diagnosis     ICD-10-CM    1  Chronic pain of right ankle  M25 571     G89 29    2  Osteochondral lesion of talar dome  M89 9     M94 9                   Subjective: Pt reports she was more sore on the dorsum of the foot since LV  States it had been feeling good but is now sore on the dorsum of the foot  Reports having an appt w/ the surgeon  Objective: See treatment diary below      Assessment: Tolerated treatment fair due to increased dorsum pain  Patient's pain monitored t/o session  Pt reported having pain w/ toe off of gait  Plan: Continue per plan of care  Potential discharge next visit       Dx: R ankle pain/ osteochondral lesion  EPOC: 3/8/22  CO-MORBIDITIES:  PERSONAL FACTORS:  Precautions: none      Manuals 1/25 2/8 2/15 2/22 3/1 3/8 3/15      R ankle manual stretching  JK WE WE  JK JK      R ankle AP joint mobs     th TH np      Progress note th JK WE D/c th          9' 10' 10' 15' 8' 12'      Neuro Re-Ed             Sidestepping on foam     2 laps 2 laps 2 laps      Mini squats on airex  10x5" 5"x10 5"x15 5"x15 5"x15 5"x15      SLS R  nv 10"x5 Grn  Foam  10"x10 30"x1 GTF 30"x2 GTF 30"x2      Combo chair: soleus decell     NV        Heel toe inch worm             HR/TR FT side      1 lap np                   Ther Ex             HEP instruct 5'            bike  5' 5' 5' 6' 6' 6'      HR  10  Slant   L2  x10 slant L2 x10 slant L2 x20 slant L2 x20      R gastroc stretch  3x20" 20"x3 ea 20"x5 20"x3 20"x3 20"x3      1/2 kneeling stretch over 2nd MT  10x5" 5"x10 20"x3 10x10" 10x10" 10x10"      Seated BAPS board  First bd 10x First bd 10x ea First bd 10x ea         4 way ankle TB ex  OTB 15x ea OTB 15x ea OTB 15x ea GTB x15 ea  GTB x20 ea      Soleus stretch      20"x3 np      Ther Activity                                       Gait Training                                       Modalities

## 2022-03-18 ENCOUNTER — OFFICE VISIT (OUTPATIENT)
Dept: OBGYN CLINIC | Facility: CLINIC | Age: 57
End: 2022-03-18
Payer: COMMERCIAL

## 2022-03-18 VITALS
BODY MASS INDEX: 33.29 KG/M2 | HEIGHT: 64 IN | DIASTOLIC BLOOD PRESSURE: 84 MMHG | WEIGHT: 195 LBS | SYSTOLIC BLOOD PRESSURE: 122 MMHG

## 2022-03-18 DIAGNOSIS — M94.9 OSTEOCHONDRAL LESION OF TALAR DOME: Primary | ICD-10-CM

## 2022-03-18 DIAGNOSIS — M89.9 OSTEOCHONDRAL LESION OF TALAR DOME: Primary | ICD-10-CM

## 2022-03-18 PROCEDURE — 3008F BODY MASS INDEX DOCD: CPT | Performed by: ORTHOPAEDIC SURGERY

## 2022-03-18 PROCEDURE — 99213 OFFICE O/P EST LOW 20 MIN: CPT | Performed by: ORTHOPAEDIC SURGERY

## 2022-03-18 PROCEDURE — 1036F TOBACCO NON-USER: CPT | Performed by: ORTHOPAEDIC SURGERY

## 2022-03-18 NOTE — PROGRESS NOTES
FLORENCIO Pedroza  Attending, Orthopaedic Surgery  [facility] [address] [facility]      ORTHOPAEDIC FOOT AND ANKLE CLINIC VISIT     Assessment:     Encounter Diagnosis   Name Primary?  Osteochondral lesion of talar dome Yes            Plan:   · The patient verbalized understanding of exam findings and treatment plan  We engaged in the shared decision-making process and treatment options were discussed at length with the patient  Surgical and conservative management discussed today along with risks and benefits  · Andree was doing well but had a new injury which set her back a bit  She continues to improve with weakness  · She will continue PT and HEP per the therapist  · She does not have everyday walking pain but soreness on certain days  · She will continue supportive shoe wear  · We will see her back in 2 months and if she has continued pain will repeat the MRI  Return in about 2 months (around 5/18/2022)  History of Present Illness:   Chief Complaint:   Chief Complaint   Patient presents with    Right Ankle - Follow-up     Luis Brownlee is a 61 y o  female who is being seen in follow-up for Right ankle  When we last saw she we recommended PT for strengthening  Pain has somewhat improved  She had a slip on ice 2 weeks ago and has some soreness with the past 2 weeks but stability has improved  She continues with PT        Pain/symptom timing:  Worse during the day when active  Pain/symptom context:  Worse with activites and work  Pain/symptom modifying factors:  Rest makes better, activities make worse  Pain/symptom associated signs/symptoms: none    Prior treatment   · NSAIDsYes   · Injections No   · Bracing/Orthotics No    · Physical Therapy Yes     Orthopedic Surgical History:   None    Past Medical, Surgical and Social History:  Past Medical History:  has a past medical history of Abdominal pain, Anxiety, Back pain, Back pain, Saucedo's esophagus, Bilateral ovarian cysts, Cyst of buttocks, Diverticulosis, GERD (gastroesophageal reflux disease), Hepatic steatosis, Hyperlipidemia, IBS (irritable bowel syndrome), IBS (irritable bowel syndrome), Liver disease, Neuroma, Pink eye disease of both eyes, Seasonal allergies, and Squamous cell carcinoma  Problem List: does not have any pertinent problems on file  Past Surgical History:  has a past surgical history that includes Mohs surgery (Left); pr colonoscopy flx dx w/collj spec when pfrmd (N/A, 6/2/2017); Esophagogastroduodenoscopy; Lipoma resection (N/A, 8/30/2017); Nerve repair (Right, 11/17/2017); Colonoscopy (06/02/2017); and Upper gastrointestinal endoscopy (08/06/2019)  Family History: family history includes Cancer in her father; Colon cancer in her father; Colon polyps in her mother; Dementia in her father; No Known Problems in her daughter, maternal grandfather, maternal grandmother, paternal grandfather, and paternal grandmother; Thyroid disease in her mother  Social History:  reports that she has never smoked  She has never used smokeless tobacco  She reports current alcohol use  She reports that she does not use drugs  Current Medications: has a current medication list which includes the following prescription(s): acetaminophen, alprazolam, vitamin c, cetirizine, cholecalciferol, esomeprazole, zaleplon, and simethicone  Allergies: is allergic to pollen extract and sulfa antibiotics       Review of Systems:  General- denies fever/chills  HEENT- denies hearing loss or sore throat  Eyes- denies eye pain or visual disturbances, denies red eyes  Respiratory- denies cough or SOB  Cardio- denies chest pain or palpitations  GI- denies abdominal pain  Endocrine- denies urinary frequency  Urinary- denies pain with urination  Musculoskeletal- Negative except noted above  Skin- denies rashes or wounds  Neurological- denies dizziness or headache  Psychiatric- denies anxiety or difficulty concentrating    Physical Exam:   BP 122/84 (BP Location: Left arm, Patient Position: Sitting, Cuff Size: Adult)   Ht 5' 4" (1 626 m)   Wt 88 5 kg (195 lb)   BMI 33 47 kg/m²   General/Constitutional: No apparent distress: well-nourished and well developed  Eyes: normal ocular motion  Lymphatic: No appreciable lymphadenopathy  Respiratory: Non-labored breathing  Vascular: No edema, swelling or tenderness, except as noted in detailed exam   Integumentary: No impressive skin lesions present, except as noted in detailed exam   Neuro: No ataxia or tremors noted  Psych: Normal mood and affect, oriented to person, place and time  Appropriate affect  Musculoskeletal: Normal, except as noted in detailed exam and in HPI  Examination    Right    Gait Normal   Musculoskeletal No tenderness    Skin Normal       Nails Normal    Range of Motion  15 degrees dorsiflexion, 40 degrees plantarflexion  Subtalar motion: normal    Stability Stable    Muscle Strength 5/5 tibialis anterior  5/5 gastrocnemius-soleus  5/5 posterior tibialis  5/5 peroneal/eversion strength  5/5 EHL  5/5 FHL    Neurologic Normal    Sensation  Intact to light touch throughout sural, saphenous, superficial peroneal, deep peroneal and medial/lateral plantar nerve distributions  [place]-Cristiano 5 07 filament (10g) testing was deferred  Cardiovascular Brisk capillary refill < 2 seconds,intact DP and PT pulses    Special Tests Click on anterior drawer      Imaging Studies:   No new imaging    Scribe Attestation    I,:  Azalea Arreola PA-C am acting as a scribe while in the presence of the attending physician :       I,:  Jailyn Beauchamp MD personally performed the services described in this documentation    as scribed in my presence :             Charlynne Ruts Lachman, MD  Foot & Ankle Surgery   Department [address]      I personally performed the service  Charlynne Ruts Lachman, MD

## 2022-03-22 ENCOUNTER — APPOINTMENT (OUTPATIENT)
Dept: PHYSICAL THERAPY | Facility: CLINIC | Age: 57
End: 2022-03-22
Payer: COMMERCIAL

## 2022-03-29 ENCOUNTER — APPOINTMENT (OUTPATIENT)
Dept: PHYSICAL THERAPY | Facility: CLINIC | Age: 57
End: 2022-03-29
Payer: COMMERCIAL

## 2022-04-24 ENCOUNTER — HOSPITAL ENCOUNTER (OUTPATIENT)
Dept: MAMMOGRAPHY | Facility: IMAGING CENTER | Age: 57
Discharge: HOME | End: 2022-04-24
Payer: COMMERCIAL

## 2022-04-24 VITALS — HEIGHT: 64 IN | BODY MASS INDEX: 33.12 KG/M2 | WEIGHT: 194 LBS

## 2022-04-24 DIAGNOSIS — Z12.31 ENCOUNTER FOR SCREENING MAMMOGRAM FOR MALIGNANT NEOPLASM OF BREAST: ICD-10-CM

## 2022-04-24 DIAGNOSIS — Z12.31 VISIT FOR SCREENING MAMMOGRAM: ICD-10-CM

## 2022-04-24 PROCEDURE — 77067 SCR MAMMO BI INCL CAD: CPT

## 2022-04-24 PROCEDURE — 77063 BREAST TOMOSYNTHESIS BI: CPT

## 2022-05-13 ENCOUNTER — OFFICE VISIT (OUTPATIENT)
Dept: OBGYN CLINIC | Facility: CLINIC | Age: 57
End: 2022-05-13
Payer: COMMERCIAL

## 2022-05-13 VITALS
WEIGHT: 194 LBS | BODY MASS INDEX: 33.12 KG/M2 | SYSTOLIC BLOOD PRESSURE: 120 MMHG | DIASTOLIC BLOOD PRESSURE: 78 MMHG | HEIGHT: 64 IN

## 2022-05-13 DIAGNOSIS — M94.9 OSTEOCHONDRAL LESION OF TALAR DOME: Primary | ICD-10-CM

## 2022-05-13 DIAGNOSIS — M89.9 OSTEOCHONDRAL LESION OF TALAR DOME: Primary | ICD-10-CM

## 2022-05-13 DIAGNOSIS — M25.371 ANKLE INSTABILITY, RIGHT: ICD-10-CM

## 2022-05-13 PROCEDURE — 99213 OFFICE O/P EST LOW 20 MIN: CPT | Performed by: ORTHOPAEDIC SURGERY

## 2022-05-13 PROCEDURE — 1036F TOBACCO NON-USER: CPT | Performed by: ORTHOPAEDIC SURGERY

## 2022-05-13 PROCEDURE — 3008F BODY MASS INDEX DOCD: CPT | Performed by: ORTHOPAEDIC SURGERY

## 2022-05-13 NOTE — PROGRESS NOTES
FLORENCIO Ko  Attending, Orthopaedic Surgery  [facility] [address] [facility]      ORTHOPAEDIC FOOT AND ANKLE CLINIC VISIT     Assessment:     Encounter Diagnoses   Name Primary? 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 and positive anterior drawer but does not have everyday walking pain and has improved somewhat with PT and HEP  She is happy with her progress  · If she has recurrent ankle instability or her ankle negatively effects her quality of life, she would be a candidate for surgery  · She may be a candidate for ankle scope, microfracture with biocartilage, Modified [place] to address the OCD and ATFL instability  · She will continue the HEP and monitor how she progresses  Return if symptoms worsen or fail to improve  History of Present Illness:   Chief Complaint:   Chief Complaint   Patient presents with    Right Ankle - Pain     Radha Churchill is a 61 y o  female who is being seen in follow-up for Right ankle OCD lesion and ankle sprain  When we last saw she we recommended contnued PT and HEP  Pain has somewhat improved  Residual pain is localized at anterior ankle and lateral ankle soreness intermittently with minimal radiating and described as sharp and severe        Pain/symptom timing:  Worse during the day when active  Pain/symptom context:  Worse with activites and work  Pain/symptom modifying factors:  Rest makes better, activities make worse  Pain/symptom associated signs/symptoms: none    Prior treatment   · NSAIDsYes   · Injections No   · Bracing/Orthotics Yes    · Physical Therapy Yes     Orthopedic Surgical History:   See Below    Past Medical, Surgical and Social History:  Past Medical History:  has a past medical history of Abdominal pain, Anxiety, Back pain, Back pain, Saucedo's esophagus, Bilateral ovarian cysts, Cyst of buttocks, Diverticulosis, GERD (gastroesophageal reflux disease), Hepatic steatosis, Hyperlipidemia, IBS (irritable bowel syndrome), IBS (irritable bowel syndrome), Liver disease, Neuroma, Pink eye disease of both eyes, Seasonal allergies, and Squamous cell carcinoma  Problem List: does not have any pertinent problems on file  Past Surgical History:  has a past surgical history that includes Mohs surgery (Left); pr colonoscopy flx dx w/collj spec when pfrmd (N/A, 6/2/2017); Esophagogastroduodenoscopy; Lipoma resection (N/A, 8/30/2017); Nerve repair (Right, 11/17/2017); Colonoscopy (06/02/2017); and Upper gastrointestinal endoscopy (08/06/2019)  Family History: family history includes Cancer in her father; Colon cancer in her father; Colon polyps in her mother; Dementia in her father; No Known Problems in her daughter, maternal grandfather, maternal grandmother, paternal grandfather, and paternal grandmother; Thyroid disease in her mother  Social History:  reports that she has never smoked  She has never used smokeless tobacco  She reports current alcohol use  She reports that she does not use drugs  Current Medications: has a current medication list which includes the following prescription(s): acetaminophen, alprazolam, vitamin c, cetirizine, cholecalciferol, esomeprazole, zaleplon, and simethicone  Allergies: is allergic to pollen extract and sulfa antibiotics       Review of Systems:  General- denies fever/chills  HEENT- denies hearing loss or sore throat  Eyes- denies eye pain or visual disturbances, denies red eyes  Respiratory- denies cough or SOB  Cardio- denies chest pain or palpitations  GI- denies abdominal pain  Endocrine- denies urinary frequency  Urinary- denies pain with urination  Musculoskeletal- Negative except noted above  Skin- denies rashes or wounds  Neurological- denies dizziness or headache  Psychiatric- denies anxiety or difficulty concentrating    Physical Exam:   /78 (BP Location: Left arm, Patient Position: Sitting, Cuff Size: Adult)   Ht 5' 4" (1 626 m)   Wt 88 kg (194 lb)   BMI 33 30 kg/m²   General/Constitutional: No apparent distress: well-nourished and well developed  Eyes: normal ocular motion  Lymphatic: No appreciable lymphadenopathy  Respiratory: Non-labored breathing  Vascular: No edema, swelling or tenderness, except as noted in detailed exam   Integumentary: No impressive skin lesions present, except as noted in detailed exam   Neuro: No ataxia or tremors noted  Psych: Normal mood and affect, oriented to person, place and time  Appropriate affect  Musculoskeletal: Normal, except as noted in detailed exam and in HPI  Examination    Right    Gait Normal   Musculoskeletal Tender to palpation at anterior ankle and ATFL    Skin Normal       Nails Normal    Range of Motion  20 degrees dorsiflexion, 40 degrees plantarflexion  Subtalar motion: normal    Stability Stable    Muscle Strength 5/5 tibialis anterior  5/5 gastrocnemius-soleus  5/5 posterior tibialis  5/5 peroneal/eversion strength  5/5 EHL  5/5 FHL    Neurologic Normal    Sensation  Intact to light touch throughout sural, saphenous, superficial peroneal, deep peroneal and medial/lateral plantar nerve distributions  [place]-Cristiano 5 07 filament (10g) testing was deferred      Cardiovascular Brisk capillary refill < 2 seconds,intact DP and PT pulses    Special Tests Positive anterior drawer      Imaging Studies:   No new imaging    Scribe Attestation    I,:  Lexy Alonso PA-C am acting as a scribe while in the presence of the attending physician :       I,:  Osbaldo Hughes MD personally performed the services described in this documentation    as scribed in my presence :             Asha Wallace Wilber Tipton MD  Foot & Ankle Surgery   Department [address]      I personally performed the service  Roena Baar Lachman, MD

## 2022-05-14 DIAGNOSIS — G47.09 OTHER INSOMNIA: ICD-10-CM

## 2022-05-16 RX ORDER — ZALEPLON 10 MG/1
CAPSULE ORAL
Qty: 30 CAPSULE | Refills: 1 | Status: SHIPPED | OUTPATIENT
Start: 2022-05-16 | End: 2022-08-01

## 2022-08-01 DIAGNOSIS — G47.09 OTHER INSOMNIA: ICD-10-CM

## 2022-08-01 RX ORDER — ZALEPLON 10 MG/1
CAPSULE ORAL
Qty: 30 CAPSULE | Refills: 1 | Status: SHIPPED | OUTPATIENT
Start: 2022-08-01 | End: 2022-09-06

## 2022-08-23 DIAGNOSIS — E78.5 HYPERLIPIDEMIA, UNSPECIFIED HYPERLIPIDEMIA TYPE: Primary | ICD-10-CM

## 2022-08-23 DIAGNOSIS — K21.00 GASTROESOPHAGEAL REFLUX DISEASE WITH ESOPHAGITIS, UNSPECIFIED WHETHER HEMORRHAGE: ICD-10-CM

## 2022-08-23 DIAGNOSIS — M93.20 OSTEOCHONDRITIS DISSECANS: ICD-10-CM

## 2022-08-23 DIAGNOSIS — K76.0 HEPATIC STEATOSIS: ICD-10-CM

## 2022-08-23 DIAGNOSIS — D21.9 MYXOMA DETERMINED BY BIOPSY OF MUSCLE: ICD-10-CM

## 2022-08-23 DIAGNOSIS — Z00.00 HEALTHCARE MAINTENANCE: ICD-10-CM

## 2022-08-23 DIAGNOSIS — K22.70 BARRETT'S ESOPHAGUS WITHOUT DYSPLASIA: ICD-10-CM

## 2022-08-23 DIAGNOSIS — K58.1 IRRITABLE BOWEL SYNDROME WITH CONSTIPATION: ICD-10-CM

## 2022-08-23 DIAGNOSIS — K21.9 GASTROESOPHAGEAL REFLUX DISEASE WITHOUT ESOPHAGITIS: ICD-10-CM

## 2022-08-23 DIAGNOSIS — D36.10 NEUROFIBROMA: ICD-10-CM

## 2022-08-23 DIAGNOSIS — R74.01 ELEVATED ALT MEASUREMENT: ICD-10-CM

## 2022-08-23 DIAGNOSIS — K57.90 DIVERTICULOSIS: ICD-10-CM

## 2022-08-26 ENCOUNTER — TELEPHONE (OUTPATIENT)
Dept: FAMILY MEDICINE CLINIC | Facility: CLINIC | Age: 57
End: 2022-08-26

## 2022-08-26 DIAGNOSIS — E83.52 HYPERCALCEMIA: Primary | ICD-10-CM

## 2022-08-26 NOTE — TELEPHONE ENCOUNTER
Maria Isabel (Luke Park [de-identified]) [phone]                                                                                                                                                                                                                                                                  Spoke w/ Joann Barnes,   They are able to add Vitamin D  Not able to add ionized calcium or PTH  PTH needs to be  in a plasma tube, ionized tube has to be in a separate vial, on its own

## 2022-08-26 NOTE — PROGRESS NOTES
Please contact [facility]  Patient had blood work done earlier today  Can be please add level of ionized calcium vitamin-D and PTH   diagnosis:  hypercalcemia      Thank you

## 2022-08-26 NOTE — TELEPHONE ENCOUNTER
----- Message from Brittaney Iyer MD sent at 8/26/2022  2:59 PM EDT -----  Please contact [facility]  Patient had blood work done earlier today  Can be please add level of ionized calcium vitamin-D and PTH   diagnosis:  hypercalcemia      Thank you

## 2022-08-27 LAB
25(OH)D3 SERPL-MCNC: 65 NG/ML (ref 30–100)
ALBUMIN SERPL-MCNC: 4.5 G/DL (ref 3.6–5.1)
ALBUMIN/GLOB SERPL: 2 (CALC) (ref 1–2.5)
ALP SERPL-CCNC: 59 U/L (ref 37–153)
ALT SERPL-CCNC: 32 U/L (ref 6–29)
AST SERPL-CCNC: 20 U/L (ref 10–35)
BASOPHILS # BLD AUTO: 29 CELLS/UL (ref 0–200)
BASOPHILS NFR BLD AUTO: 0.5 %
BILIRUB SERPL-MCNC: 0.4 MG/DL (ref 0.2–1.2)
BUN SERPL-MCNC: 18 MG/DL (ref 7–25)
BUN/CREAT SERPL: ABNORMAL (CALC) (ref 6–22)
CALCIUM SERPL-MCNC: 10.5 MG/DL (ref 8.6–10.4)
CHLORIDE SERPL-SCNC: 105 MMOL/L (ref 98–110)
CHOLEST SERPL-MCNC: 204 MG/DL
CHOLEST/HDLC SERPL: 5.7 (CALC)
CO2 SERPL-SCNC: 25 MMOL/L (ref 20–32)
CREAT SERPL-MCNC: 0.7 MG/DL (ref 0.5–1.03)
EOSINOPHIL # BLD AUTO: 191 CELLS/UL (ref 15–500)
EOSINOPHIL NFR BLD AUTO: 3.3 %
ERYTHROCYTE [DISTWIDTH] IN BLOOD BY AUTOMATED COUNT: 13.4 % (ref 11–15)
GFR/BSA.PRED SERPLBLD CYS-BASED-ARV: 100 ML/MIN/1.73M2
GLOBULIN SER CALC-MCNC: 2.3 G/DL (CALC) (ref 1.9–3.7)
GLUCOSE SERPL-MCNC: 102 MG/DL (ref 65–99)
HCT VFR BLD AUTO: 41.2 % (ref 35–45)
HDLC SERPL-MCNC: 36 MG/DL
HGB BLD-MCNC: 14 G/DL (ref 11.7–15.5)
LDLC SERPL CALC-MCNC: 127 MG/DL (CALC)
LYMPHOCYTES # BLD AUTO: 1583 CELLS/UL (ref 850–3900)
LYMPHOCYTES NFR BLD AUTO: 27.3 %
MCH RBC QN AUTO: 28.4 PG (ref 27–33)
MCHC RBC AUTO-ENTMCNC: 34 G/DL (ref 32–36)
MCV RBC AUTO: 83.6 FL (ref 80–100)
MONOCYTES # BLD AUTO: 539 CELLS/UL (ref 200–950)
MONOCYTES NFR BLD AUTO: 9.3 %
NEUTROPHILS # BLD AUTO: 3457 CELLS/UL (ref 1500–7800)
NEUTROPHILS NFR BLD AUTO: 59.6 %
NONHDLC SERPL-MCNC: 168 MG/DL (CALC)
PLATELET # BLD AUTO: 246 THOUSAND/UL (ref 140–400)
PMV BLD REES-ECKER: 11 FL (ref 7.5–12.5)
POTASSIUM SERPL-SCNC: 4.4 MMOL/L (ref 3.5–5.3)
PROT SERPL-MCNC: 6.8 G/DL (ref 6.1–8.1)
RBC # BLD AUTO: 4.93 MILLION/UL (ref 3.8–5.1)
REF LAB TEST NAME: NORMAL
REF LAB TEST: NORMAL
SL AMB CLIENT CONTACT: NORMAL
SODIUM SERPL-SCNC: 140 MMOL/L (ref 135–146)
TRIGL SERPL-MCNC: 269 MG/DL
TSH SERPL-ACNC: 2.1 MIU/L (ref 0.4–4.5)
WBC # BLD AUTO: 5.8 THOUSAND/UL (ref 3.8–10.8)

## 2022-08-29 ENCOUNTER — RA CDI HCC (OUTPATIENT)
Dept: OTHER | Facility: HOSPITAL | Age: 57
End: 2022-08-29

## 2022-08-29 NOTE — PROGRESS NOTES
[facility] 75  coding opportunities       Chart reviewed, no opportunity found: CHART REVIEWED, NO OPPORTUNITY FOUND        Patients Insurance        Commercial Insurance: [employer]

## 2022-09-06 ENCOUNTER — OFFICE VISIT (OUTPATIENT)
Dept: FAMILY MEDICINE CLINIC | Facility: CLINIC | Age: 57
End: 2022-09-06
Payer: COMMERCIAL

## 2022-09-06 VITALS
BODY MASS INDEX: 33.12 KG/M2 | WEIGHT: 194 LBS | HEART RATE: 85 BPM | RESPIRATION RATE: 18 BRPM | TEMPERATURE: 97.8 F | HEIGHT: 64 IN | SYSTOLIC BLOOD PRESSURE: 122 MMHG | DIASTOLIC BLOOD PRESSURE: 80 MMHG | OXYGEN SATURATION: 97 %

## 2022-09-06 DIAGNOSIS — H69.82 DYSFUNCTION OF LEFT EUSTACHIAN TUBE: ICD-10-CM

## 2022-09-06 DIAGNOSIS — K22.70 BARRETT'S ESOPHAGUS WITHOUT DYSPLASIA: ICD-10-CM

## 2022-09-06 DIAGNOSIS — G47.09 OTHER INSOMNIA: ICD-10-CM

## 2022-09-06 DIAGNOSIS — Z00.00 ENCOUNTER FOR WELLNESS EXAMINATION IN ADULT: Primary | ICD-10-CM

## 2022-09-06 DIAGNOSIS — E78.5 HYPERLIPIDEMIA, UNSPECIFIED HYPERLIPIDEMIA TYPE: ICD-10-CM

## 2022-09-06 DIAGNOSIS — K21.00 GASTROESOPHAGEAL REFLUX DISEASE WITH ESOPHAGITIS, UNSPECIFIED WHETHER HEMORRHAGE: ICD-10-CM

## 2022-09-06 DIAGNOSIS — Z12.11 SCREENING FOR COLON CANCER: ICD-10-CM

## 2022-09-06 PROCEDURE — 3725F SCREEN DEPRESSION PERFORMED: CPT | Performed by: FAMILY MEDICINE

## 2022-09-06 PROCEDURE — 99396 PREV VISIT EST AGE 40-64: CPT | Performed by: FAMILY MEDICINE

## 2022-09-06 RX ORDER — MOMETASONE FUROATE 50 UG/1
2 SPRAY, METERED NASAL DAILY
Qty: 17 G | Refills: 11 | Status: SHIPPED | OUTPATIENT
Start: 2022-09-06

## 2022-09-06 RX ORDER — ROSUVASTATIN CALCIUM 5 MG/1
5 TABLET, COATED ORAL DAILY
Qty: 30 TABLET | Refills: 5 | Status: SHIPPED | OUTPATIENT
Start: 2022-09-06

## 2022-09-06 RX ORDER — TRAZODONE HYDROCHLORIDE 50 MG/1
TABLET ORAL
Qty: 60 TABLET | Refills: 1 | Status: SHIPPED | OUTPATIENT
Start: 2022-09-06

## 2022-09-06 NOTE — PROGRESS NOTES
FAMILY PRACTICE OFFICE VISIT       NAME: Suhas Hendrickson  AGE: 61 y o  SEX: female       : 1965        MRN: [id number]        Assessment and Plan     1  Encounter for wellness examination in adult  Comments:  Patient is up-to-date with gyn exam and mammogram   She is due for colonoscopy    2  Other insomnia  Assessment & Plan:  Stops are not at due to lack of improvement  Trial of trazodone 50 to 100 mg q h s  Orders:  -     traZODone (DESYREL) 50 mg tablet; Take 1 or 2 tablets at bedtime as needed for insomnia    3  Saucedo's esophagus without dysplasia  -     Ambulatory referral to Gastroenterology; Future    4  Screening for colon cancer  -     Ambulatory referral to Gastroenterology; Future    5  Hyperlipidemia, unspecified hyperlipidemia type  Assessment & Plan:  Persistent elevation of cholesterol and triglycerides  Patient admits to dietary indiscretions  We discussed importance of weight loss and low-fat low-cholesterol diet  Family history of CAD-father  Patient will start on low-dose of Crestor 5 mg and will take medication 3 days per week  Reassess blood work in 3 months  Orders:  -     rosuvastatin (CRESTOR) 5 mg tablet; Take 1 tablet (5 mg total) by mouth daily  -     Comprehensive metabolic panel; Future  -     Lipid Panel with Direct LDL reflex; Future    6  Dysfunction of left eustachian tube  Assessment & Plan:  Continue Zyrtec daily  Add Nasonex  If symptoms persist-patient will proceed with evaluation by ENT  Orders:  -     mometasone (NASONEX) 50 mcg/act nasal spray; 2 sprays into each nostril daily    7  Gastroesophageal reflux disease with esophagitis, unspecified whether hemorrhage  Assessment & Plan:  Symptoms are well controlled on Nexium  Patient should schedule follow-up with GI  She is due for EGD due to history of Saucedo's esophagus          BMI Counseling: Body mass index is 33 3 kg/m²   The BMI is above normal  Nutrition recommendations include encouraging healthy choices of fruits and vegetables, consuming healthier snacks, moderation in carbohydrate intake, reducing intake of saturated and trans fat and reducing intake of cholesterol  Exercise recommendations include exercising 3-5 times per week  No pharmacotherapy was ordered  Patient referred to PCP  Rationale for BMI follow-up plan is due to patient being overweight or obese  Depression Screening and Follow-up Plan: Patient was screened for depression during today's encounter  They screened negative with a PHQ-2 score of 0  There are no Patient Instructions on file for this visit  Return in about 1 year (around 9/6/2023), or if symptoms worsen or fail to improve, for Annual physical/well exam     Discussed with the patient and all questioned fully answered  She will call me if any problems arise  M*Modal software was used to dictate this note  It may contain errors with dictating incorrect words/spelling  Please contact provider directly with any questions  Chief Complaint     Chief Complaint   Patient presents with    Physical Exam     Yearly    Care Gap Colonoscopy     due    Gynecologic Exam     Message sent Care Gap Team- [employer]    Insomnia     Unable to stay asleep      Tinnitus     Intermittently noticed since December  During vacation had pain-left sided        History of Present Illness     Well exam  Labs  D/w pt  Mild elevation of cholesterol and triglycerides  Stable elevation of ALT, 30 to, known fatty liver  Patient offers no complaints of abdominal pain  She remains on Nexium for chronic GERD and Saucedo's esophagus  Work reveals mild elevation of Ca 10 5, patient is not taking any calcium containing supplements pain  Father- CAD    [facility] cough DR Jesus-patient had colonoscopy and EGD back in June of 2017 and then EGD in 2019  Patient is due for repeat EGD and colonoscopy now  Left ear -intermmittenmt pain  Lasting split of the second  Worse with recent flying   Chronic high pitch tinnitus    UTD with GYN and mammo  Chronic right  ankle pain - seen by D Lachman, prefers to continue conservative measure, patient is considered a good surgical candidate in the long run  Chronic insomnia  Patient takes sonata, she does not feel that medication is effective  Gynecologic Exam        Review of Systems   Review of Systems   Constitutional: Negative  HENT: Positive for ear pain and tinnitus  Eyes: Negative  Respiratory: Negative  Cardiovascular: Negative  Gastrointestinal: Negative  Endocrine: Negative  Genitourinary: Negative  Musculoskeletal: Negative  Skin: Negative  Allergic/Immunologic: Positive for environmental allergies  Neurological: Negative  Hematological: Negative  Psychiatric/Behavioral: Positive for sleep disturbance         Active Problem List     Patient Active Problem List   Diagnosis    Neurofibroma    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    Saucedo's esophagus without dysplasia    Hepatic steatosis    Irritable bowel syndrome (IBS)    Hyperlipemia    Generalized anxiety disorder    Esophageal reflux    Elevated ALT measurement    Diverticulosis    Bilateral ovarian cysts    Allergic rhinitis    Other insomnia    Osteochondritis dissecans    Dysfunction of left eustachian tube       Past Medical History:  Past Medical History:   Diagnosis Date    Abdominal pain     Anxiety     Back pain     Back pain     Saucedo's esophagus     Bilateral ovarian cysts     Cyst of buttocks     Diverticulosis     GERD (gastroesophageal reflux disease)     Hepatic steatosis     Hyperlipidemia     IBS (irritable bowel syndrome)     IBS (irritable bowel syndrome)     Liver disease     fatty liver    Neuroma     Pink eye disease of both eyes     Chronic and was recently treated    Seasonal allergies     Squamous cell carcinoma     Neck       Past Surgical History:  Past Surgical History:   Procedure Laterality Date    COLONOSCOPY  06/02/2017    ESOPHAGOGASTRODUODENOSCOPY      LIPOMA RESECTION N/A 8/30/2017    Procedure: EXCISION GLUTEAL MASS;  Surgeon: Joyce Bishop MD;  Location: [facility];  Service: General    MOHS SURGERY Left     neck    NERVE REPAIR Right 11/17/2017    Procedure: 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 (FROZEN SECTION); Surgeon: Rachel Blanco MD;  Location:  MAIN OR;  Service: Neurosurgery    WA COLONOSCOPY FLX DX W/COLL[facility] WHEN PFRMD N/A 6/2/2017    Procedure: EGD AND COLONOSCOPY;  Surgeon: Kim Sherman MD;  [facility];   Service: Gastroenterology    UPPER GASTROINTESTINAL ENDOSCOPY  08/06/2019       Family History:  Family History   Problem Relation Age of Onset   Naomy Leisure Cancer Father     Colon cancer Father     Dementia Father     Colon polyps Mother     Thyroid disease Mother     No Known Problems Daughter     No Known Problems Maternal Grandmother     No Known Problems Maternal Grandfather     No Known Problems Paternal Grandmother     No Known Problems Paternal Grandfather        Social History:  Social History     Socioeconomic History    Marital status: /Civil Union     Spouse name: Not on file    Number of children: 2    Years of education: Not on file    Highest education level: Not on file   Occupational History    Not on file   Tobacco Use    Smoking status: Never Smoker    Smokeless tobacco: Never Used   Vaping Use    Vaping Use: Never used   Substance and Sexual Activity    Alcohol use: Yes     Comment: 5 monthly; socially    Drug use: No    Sexual activity: Not on file   Other Topics Concern    Not on file   Social History Narrative    Lives with spouse     Social Determinants of Health     Financial Resource Strain: Not on file   Food Insecurity: Not on file   Transportation Needs: Not on file   Physical Activity: Not on file   Stress: Not on file   Social Connections: Not on file Intimate Partner Violence: Not on file   Housing Stability: Not on file         Objective     Vitals:    09/06/22 1730   BP: 122/80   BP Location: Right arm   Patient Position: Sitting   Cuff Size: Large   Pulse: 85   Resp: 18   Temp: 97 8 °F (36 6 °C)   TempSrc: Temporal   SpO2: 97%   Weight: 88 kg (194 lb)   Height: 5' 4" (1 626 m)       Wt Readings from Last 3 Encounters:   09/06/22 88 kg (194 lb)   05/13/22 88 kg (194 lb)   04/24/22 88 kg (194 lb 0 1 oz)       Physical Exam  Vitals and nursing note reviewed  Constitutional:       General: She is not in acute distress  Appearance: Normal appearance  She is well-developed  She is not ill-appearing  HENT:      Head: Normocephalic and atraumatic  Comments: No TMJ tenderness or excessive laxity     Right Ear: Tympanic membrane, ear canal and external ear normal       Left Ear: Tympanic membrane, ear canal and external ear normal    Eyes:      Conjunctiva/sclera: Conjunctivae normal    Neck:      Thyroid: No thyromegaly  Vascular: No carotid bruit  Cardiovascular:      Rate and Rhythm: Normal rate and regular rhythm  Heart sounds: Normal heart sounds  No murmur heard  Pulmonary:      Effort: Pulmonary effort is normal  No respiratory distress  Breath sounds: Normal breath sounds  No wheezing  Abdominal:      General: There is no distension or abdominal bruit  Palpations: Abdomen is soft  Tenderness: There is no abdominal tenderness  There is no guarding  Hernia: No hernia is present  Musculoskeletal:         General: Normal range of motion  Cervical back: Neck supple  No rigidity  Right lower leg: No edema  Left lower leg: No edema  Skin:     General: Skin is warm  Findings: No rash  Neurological:      General: No focal deficit present  Mental Status: She is alert and oriented to person, place, and time  Cranial Nerves: No cranial nerve deficit        Coordination: Coordination normal    Psychiatric:         Mood and Affect: Mood normal          Behavior: Behavior normal          Thought Content:  Thought content normal           Pertinent Laboratory/Diagnostic Studies:    Lab Results   Component Value Date    WBC 5 8 08/25/2022    HGB 14 0 08/25/2022    HCT 41 2 08/25/2022    MCV 83 6 08/25/2022     08/25/2022       Lab Results   Component Value Date    TSH 2 10 08/25/2022       Lab Results   Component Value Date    CHOL 197 10/28/2017     Lab Results   Component Value Date    TRIG 269 (H) 08/25/2022     Lab Results   Component Value Date    HDL 36 (L) 08/25/2022     Lab Results   Component Value Date    LDLCALC 127 (H) 08/25/2022     Lab Results   Component Value Date    HGBA1C 5 7 (H) 12/12/2020     Lab Results   Component Value Date    SODIUM 140 08/25/2022    K 4 4 08/25/2022     08/25/2022    CO2 25 08/25/2022    BUN 18 08/25/2022    CREATININE 0 70 08/25/2022    GLUC 102 (H) 08/25/2022    GLUF 90 08/08/2014    CALCIUM 10 5 (H) 08/25/2022    AST 20 08/25/2022    ALT 32 (H) 08/25/2022    ALKPHOS 59 08/25/2022    PROT 7 0 10/28/2017    TP 6 8 08/25/2022    BILITOT 0 5 10/28/2017    TBILI 0 4 08/25/2022    EGFR 100 08/25/2022       Orders Placed This Encounter   Procedures    Comprehensive metabolic panel    Lipid Panel with Direct LDL reflex    Ambulatory referral to Gastroenterology       ALLERGIES:  Allergies   Allergen Reactions    Pollen Extract     Sulfa Antibiotics Rash       Current Medications     Current Outpatient Medications   Medication Sig Dispense Refill    cetirizine (ZyrTEC) 10 mg tablet Take 10 mg by mouth daily      Cholecalciferol (VITAMIN D3 PO) Take by mouth daily      esomeprazole (NexIUM) 40 MG capsule TAKE ONE CAPSULE BY MOUTH EVERY DAY 30 capsule 6    mometasone (NASONEX) 50 mcg/act nasal spray 2 sprays into each nostril daily 17 g 11    rosuvastatin (CRESTOR) 5 mg tablet Take 1 tablet (5 mg total) by mouth daily 30 tablet 5    traZODone (DESYREL) 50 mg tablet Take 1 or 2 tablets at bedtime as needed for insomnia 60 tablet 1     No current facility-administered medications for this visit         Medications Discontinued During This Encounter   Medication Reason    acetaminophen (TYLENOL) 500 mg tablet Therapy completed    simethicone (MYLICON,GAS-X) 459 MG capsule Therapy completed    zaleplon (SONATA) 10 MG capsule     ALPRAZolam (XANAX) 0 5 mg tablet     Ascorbic Acid (vitamin C) 1000 MG tablet        Health Maintenance     Health Maintenance   Topic Date Due    HIV Screening  Never done    DTaP,Tdap,and Td Vaccines (1 - Tdap) Never done    Cervical Cancer Screening  Never done    PT PLAN OF CARE  03/31/2022    COVID-19 Vaccine (4 - Booster for Pfizer series) 05/08/2022    Colorectal Cancer Screening  06/02/2022    Influenza Vaccine (1) 09/01/2022    Depression Screening  09/06/2023    BMI: Adult  09/06/2023    Annual Physical  09/06/2023    BMI: Followup Plan  09/07/2023    Breast Cancer Screening: Mammogram  04/24/2024    Hepatitis C Screening  Completed    Pneumococcal Vaccine: Pediatrics (0 to 5 Years) and At-Risk Patients (6 to 59 Years)  Aged Out    HIB Vaccine  Aged Out    Hepatitis B Vaccine  Aged Out    IPV Vaccine  Aged Out    Hepatitis A Vaccine  Aged Out    Meningococcal ACWY Vaccine  Aged Out    HPV Vaccine  Aged [employer] History   Administered Date(s) Administered    COVID-19 PFIZER VACCINE 0 3 ML IM 04/14/2021, 05/05/2021, 01/08/2022    Influenza Quadrivalent Preservative Free 3 years and older IM 11/07/2014       Yadira Arndt MD

## 2022-09-07 ENCOUNTER — TELEPHONE (OUTPATIENT)
Dept: ADMINISTRATIVE | Facility: OTHER | Age: 57
End: 2022-09-07

## 2022-09-07 PROBLEM — H69.92 DYSFUNCTION OF LEFT EUSTACHIAN TUBE: Status: ACTIVE | Noted: 2022-09-07

## 2022-09-07 PROBLEM — H69.82 DYSFUNCTION OF LEFT EUSTACHIAN TUBE: Status: ACTIVE | Noted: 2022-09-07

## 2022-09-07 PROBLEM — U07.1 COVID-19 VIRUS INFECTION: Status: RESOLVED | Noted: 2021-02-27 | Resolved: 2022-09-07

## 2022-09-07 NOTE — TELEPHONE ENCOUNTER
Upon review of the In Basket request and the patient's chart, initial outreach has been made via fax, please see Contacts section for details       Thank you  Santo Hairston MA

## 2022-09-07 NOTE — ASSESSMENT & PLAN NOTE
Continue Zyrtec daily  Add Nasonex  If symptoms persist-patient will proceed with evaluation by ENT

## 2022-09-07 NOTE — ASSESSMENT & PLAN NOTE
Symptoms are well controlled on Nexium  Patient should schedule follow-up with GI    She is due for EGD due to history of Saucedo's esophagus

## 2022-09-07 NOTE — ASSESSMENT & PLAN NOTE
Persistent elevation of cholesterol and triglycerides  Patient admits to dietary indiscretions  We discussed importance of weight loss and low-fat low-cholesterol diet  Family history of CAD-father  Patient will start on low-dose of Crestor 5 mg and will take medication 3 days per week  Reassess blood work in 3 months

## 2022-09-07 NOTE — TELEPHONE ENCOUNTER
----- Message from Rosetta Yeh LPN sent at 3/1/7112  5:49 PM EDT -----  Regarding: care gap request  09/06/22 5:49 PM    Hello, our patient attached above has had Pap Smear (HPV) aka Cervical Cancer Screening completed/performed  Please assist in updating the patient chart by making an External outreach to [facility] facility  The date of service is "the last few months"      Thank you,  Rosetta Yeh LPN  [facility]

## 2022-09-07 NOTE — LETTER
Procedure Request Form: Cervical Cancer Screening      Date Requested: 22  Patient: Thora Flank  Patient : 1965   Referring Provider: Anette Lama, MD        Date of Procedure ______________________________       The above patient has informed us that they have completed their   most recent Cervical Cancer Screening at your facility  Please complete   this form and attach all corresponding procedure reports/results  Comments __________________________________________________________  ____________________________________________________________________  ____________________________________________________________________  ____________________________________________________________________    Facility Completing Procedure _________________________________________    Form Completed By (print name) _______________________________________      Signature __________________________________________________________      These reports are needed for  compliance  Please fax this completed form and a copy of the procedure report to our office located at [place] [address] as soon as possible to [phone] attention Hillary: Phone [phone]    We thank you for your assistance in treating our mutual patient

## 2022-09-07 NOTE — TELEPHONE ENCOUNTER
----- Message from Jeanna An LPN sent at 2/1/3240  5:49 PM EDT -----  Regarding: care gap request  09/06/22 5:49 PM    Hello, our patient attached above has had Pap Smear (HPV) aka Cervical Cancer Screening completed/performed  Please assist in updating the patient chart by making an External outreach to [facility] facility  The date of service is "the last few months"      Thank you,  Jeanna An LPN  [facility]

## 2022-09-08 NOTE — TELEPHONE ENCOUNTER
Upon review of the In Basket request we were able to locate, review, and update the patient chart as requested for Pap Smear (HPV) aka Cervical Cancer Screening  Any additional questions or concerns should be emailed to the Practice Liaisons via [email or web address] email, please do not reply via In Basket      Thank you  Dani Goodell, MA

## 2022-11-02 DIAGNOSIS — G47.09 OTHER INSOMNIA: ICD-10-CM

## 2022-11-02 RX ORDER — TRAZODONE HYDROCHLORIDE 50 MG/1
TABLET ORAL
Qty: 60 TABLET | Refills: 1 | Status: SHIPPED | OUTPATIENT
Start: 2022-11-02

## 2022-11-09 ENCOUNTER — OFFICE VISIT (OUTPATIENT)
Dept: GASTROENTEROLOGY | Facility: MEDICAL CENTER | Age: 57
End: 2022-11-09

## 2022-11-09 VITALS
TEMPERATURE: 99.4 F | DIASTOLIC BLOOD PRESSURE: 78 MMHG | HEART RATE: 79 BPM | SYSTOLIC BLOOD PRESSURE: 128 MMHG | WEIGHT: 178.4 LBS | BODY MASS INDEX: 30.62 KG/M2

## 2022-11-09 DIAGNOSIS — K76.0 HEPATIC STEATOSIS: Primary | ICD-10-CM

## 2022-11-09 DIAGNOSIS — Z12.11 SCREENING FOR COLON CANCER: ICD-10-CM

## 2022-11-09 DIAGNOSIS — K21.9 GASTROESOPHAGEAL REFLUX DISEASE WITHOUT ESOPHAGITIS: ICD-10-CM

## 2022-11-09 DIAGNOSIS — K22.70 BARRETT'S ESOPHAGUS WITHOUT DYSPLASIA: ICD-10-CM

## 2022-11-09 NOTE — PROGRESS NOTES
Outpatient Follow up  [address]  [address]  [facility] [address]  Laine Khan MD  Ph : [phone]  Fax : [phone]  Mobile : [phone]  Email : [email or web address]  Also available on [employer]    Renee Irvin 61 y o  female MRN: [id number]    PCP: Lucas Hammer MD  Referring: Lucas Hammer MD  [address] [place] [address]    Renee Irvin presented for a follow up visit  My recommendations are included  Please do not hesitate to contact me with any questions you may have  ASSESSMENT AND PLAN:      No problem-specific Assessment & Plan notes found for this encounter  Diagnoses and all orders for this visit:    Hepatic steatosis    Saucedo's esophagus without dysplasia  -     Ambulatory referral to Gastroenterology    Screening for colon cancer  -     Ambulatory referral to Gastroenterology      1  GERD with esophagitis/Saucedo's esophagus short-segment 1 cm tongue without dysplasia  She did not have any evidence of esophagitis on this prior EGD  Her reflux is improved  Take Nexium in the morning  Discussed the GERD diet  Will continue Nexium and will try to cut down the dose to 20 mg daily  I would recommend trying the over the counter dose for 2 weeks  If it works then let us know and I will send in a prescription for 20 mg daily  I would recommend to continue her new diet and continue to lose weight  If this helps with the reflux then would recommend to continue that instead of doing endoscopic therapy  2  Saucedo's : continue Nexium  Repeat EGD in 1 - 2 years as last one was in 2019  3  Hepatic steatosis  US elastography : no fibrosis  Has lost weight  Will follow LFT's in 3 months  4  Colon polyp (benign - last one in 2017)  Her father had colon cancer in his 63's (early)  I would recommend repeat colonoscopy in the next year  ______________________________________________________________________    SUBJECTIVE:  62 y/o with symptoms of GERD  She has had epigastric discomfort intermittently  She also has left upper quadrant pain which can be constant as well  She also has choking sensation with water/ dysphagia  She does not have much reflux/ heartburn  She has no nausea, vomiting  She is on the Nexium 40 mg daily (at night)  She also complains of bloating  She had a barium swallow done which showed esophageal dysmotility  She has no LPR symptoms  Her weight is the same  She had a ultrasound done in the past which showed hepatic steatosis  We discussed her diet at length      Interval history :     She has lost about 20 lbs with intermittent fasting  She is avoiding carbs  She has not done much exercise  Reflux is improved  Manometry / pH : normal motility and no hernia  [address] 7 3  AET : 2 3%    EGD in 8/19 : 1  1 cm tongue of Saucedo's esophagus at the GE junction  [address] of Saucedo's proximal to this area  Biopsies were done  2  Normal stomach and duodenum  REVIEW OF SYSTEMS IS OTHERWISE NEGATIVE        Historical Information   Past Medical History:   Diagnosis Date   • Abdominal pain    • Anxiety    • Back pain    • Back pain    • Saucedo's esophagus    • Bilateral ovarian cysts    • Cyst of buttocks    • Diverticulosis    • GERD (gastroesophageal reflux disease)    • Hepatic steatosis    • Hyperlipidemia    • IBS (irritable bowel syndrome)    • IBS (irritable bowel syndrome)    • Liver disease     fatty liver   • Neuroma    • Pink eye disease of both eyes     Chronic and was recently treated   • Seasonal allergies    • Squamous cell carcinoma     Neck     Past Surgical History:   Procedure Laterality Date   • COLONOSCOPY  06/02/2017   • ESOPHAGOGASTRODUODENOSCOPY     • LIPOMA RESECTION N/A 8/30/2017    Procedure: EXCISION GLUTEAL MASS;  Surgeon: Khris Narayanan MD;  [facility];  Service: General   • MOHS SURGERY Left     neck   • NERVE REPAIR Right 11/17/2017    Procedure: 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 (FROZEN SECTION); Surgeon: Melanie Moreno MD;  [facility];  Service: Neurosurgery   • ME COLONOSCOPY FLX DX W/COLLJ SPEC WHEN PFRMD N/A 6/2/2017    Procedure: EGD AND COLONOSCOPY;  Surgeon: Modesto Herrera MD;  [facility]; Service: Gastroenterology   • UPPER GASTROINTESTINAL ENDOSCOPY  08/06/2019     Social History   Social History     Substance and Sexual Activity   Alcohol Use Yes    Comment: 5 monthly; socially     Social History     Substance and Sexual Activity   Drug Use No     Social History     Tobacco Use   Smoking Status Never Smoker   Smokeless Tobacco Never Used     Family History   Problem Relation Age of Onset   • Cancer Father    • Colon cancer Father    • Dementia Father    • Colon polyps Mother    • Thyroid disease Mother    • No Known Problems Daughter    • No Known Problems Maternal Grandmother    • No Known Problems Maternal Grandfather    • No Known Problems Paternal Grandmother    • No Known Problems Paternal Grandfather        Meds/Allergies       Current Outpatient Medications:   •  cetirizine (ZyrTEC) 10 mg tablet  •  Cholecalciferol (VITAMIN D3 PO)  •  esomeprazole (NexIUM) 40 MG capsule  •  mometasone (NASONEX) 50 mcg/act nasal spray  •  rosuvastatin (CRESTOR) 5 mg tablet  •  traZODone (DESYREL) 50 mg tablet    Allergies   Allergen Reactions   • Pollen Extract    • Sulfa Antibiotics Rash           Objective     Blood pressure 128/78, pulse 79, temperature 99 4 °F (37 4 °C), temperature source Tympanic, weight 80 9 kg (178 lb 6 4 oz)  Body mass index is 30 62 kg/m²  PHYSICAL EXAM:      Physical Exam  Constitutional:       Appearance: Normal appearance  She is well-developed  HENT:      Head: Normocephalic and atraumatic  Eyes:      General: No scleral icterus       Conjunctiva/sclera: Conjunctivae normal       Pupils: Pupils are equal, round, and reactive to light  Cardiovascular:      Rate and Rhythm: Normal rate and regular rhythm  Heart sounds: Normal heart sounds  Pulmonary:      Effort: Pulmonary effort is normal  No respiratory distress  Breath sounds: Normal breath sounds  Abdominal:      General: Bowel sounds are normal  There is no distension  Palpations: Abdomen is soft  There is no mass  Tenderness: There is no abdominal tenderness  Hernia: No hernia is present  Musculoskeletal:         General: Normal range of motion  Cervical back: Normal range of motion  Lymphadenopathy:      Cervical: No cervical adenopathy  Skin:     General: Skin is warm  Neurological:      Mental Status: She is alert and oriented to person, place, and time  Psychiatric:         Behavior: Behavior normal          Thought Content: Thought content normal          Lab Results:   No visits with results within 1 Day(s) from this visit     Latest known visit with results is:   Orders Only on 08/25/2022   Component Date Value   • Total Cholesterol 08/25/2022 204 (A)   • HDL 08/25/2022 36 (A)   • Triglycerides 08/25/2022 269 (A)   • LDL Calculated 08/25/2022 127 (A)   • Chol HDLC Ratio 08/25/2022 5 7 (A)   • Non-HDL Cholesterol 08/25/2022 168 (A)   • Glucose, Random 08/25/2022 102 (A)   • BUN 08/25/2022 18    • Creatinine 08/25/2022 0 70    • eGFR 08/25/2022 100    • SL AMB BUN/CREATININE RA* 52/00/7479 NOT APPLICABLE    • Sodium 81/13/0146 140    • Potassium 08/25/2022 4 4    • Chloride 08/25/2022 105    • CO2 08/25/2022 25    • Calcium 08/25/2022 10 5 (A)   • Protein, Total 08/25/2022 6 8    • Albumin 08/25/2022 4 5    • Globulin 08/25/2022 2 3    • Albumin/Globulin Ratio 08/25/2022 2 0    • TOTAL BILIRUBIN 08/25/2022 0 4    • Alkaline Phosphatase 08/25/2022 59    • AST 08/25/2022 20    • ALT 08/25/2022 32 (A)   • White Blood Cell Count 08/25/2022 5 8    • Red Blood Cell Count 08/25/2022 4 93    • Hemoglobin 08/25/2022 14 0    • HCT 08/25/2022 41 2    • MCV 08/25/2022 83 6    • MCH 08/25/2022 28 4    • MCHC 08/25/2022 34 0    • RDW 08/25/2022 13 4    • Platelet Count 25/26/6781 246    • SL AMB MPV 08/25/2022 11 0    • Neutrophils (Absolute) 08/25/2022 3,457    • Lymphocytes (Absolute) 08/25/2022 1,583    • Monocytes (Absolute) 08/25/2022 539    • Eosinophils (Absolute) 08/25/2022 191    • Basophils ABS 08/25/2022 29    • Neutrophils 08/25/2022 59 6    • Lymphocytes 08/25/2022 27 3    • Monocytes 08/25/2022 9 3    • Eosinophils 08/25/2022 3 3    • Basophils PCT 08/25/2022 0 5    • TSH 08/25/2022 2 10    • Test Name 08/25/2022  VITAMIN D,25-OH,TOTAL,IA    • Test Code 08/25/2022  28359US    • Client Contact 08/25/2022 [facility]    • Report Always Message Si* 08/25/2022     • COMMENT 08/25/2022     • Vitamin D, 25-Hydroxy, S* 08/25/2022 65          Radiology Results:   No results found

## 2022-11-09 NOTE — PATIENT INSTRUCTIONS
1  GERD with esophagitis/Saucedo's esophagus short-segment 1 cm tongue without dysplasia  She did not have any evidence of esophagitis on this prior EGD  Her reflux is improved  Take Nexium in the morning  Discussed the GERD diet  Will continue Nexium and will try to cut down the dose to 20 mg daily  I would recommend trying the over the counter dose for 2 weeks  If it works then let us know and I will send in a prescription for 20 mg daily  I would recommend to continue her new diet and continue to lose weight  If this helps with the reflux then would recommend to continue that instead of doing endoscopic therapy  2  Saucedo's : continue Nexium  Repeat EGD in 1 - 2 years as last one was in 2019  3  Hepatic steatosis  US elastography : no fibrosis  Has lost weight  Will follow LFT's in 3 months  4  Colon polyp (benign - last one in 2017)  Her father had colon cancer in his 63's (early)  I would recommend repeat colonoscopy in the next year

## 2022-12-22 LAB
ALBUMIN SERPL-MCNC: 4.5 G/DL (ref 3.6–5.1)
ALBUMIN/GLOB SERPL: 2 (CALC) (ref 1–2.5)
ALP SERPL-CCNC: 57 U/L (ref 37–153)
ALT SERPL-CCNC: 13 U/L (ref 6–29)
AST SERPL-CCNC: 12 U/L (ref 10–35)
BILIRUB SERPL-MCNC: 0.3 MG/DL (ref 0.2–1.2)
BUN SERPL-MCNC: 26 MG/DL (ref 7–25)
BUN/CREAT SERPL: 33 (CALC) (ref 6–22)
CA-I SERPL-MCNC: 5.4 MG/DL (ref 4.8–5.6)
CALCIUM SERPL-MCNC: 10.3 MG/DL (ref 8.6–10.4)
CALCIUM SERPL-MCNC: 10.3 MG/DL (ref 8.6–10.4)
CHLORIDE SERPL-SCNC: 107 MMOL/L (ref 98–110)
CHOLEST SERPL-MCNC: 159 MG/DL
CHOLEST/HDLC SERPL: 4.5 (CALC)
CO2 SERPL-SCNC: 22 MMOL/L (ref 20–32)
CREAT SERPL-MCNC: 0.8 MG/DL (ref 0.5–1.03)
GFR/BSA.PRED SERPLBLD CYS-BASED-ARV: 85 ML/MIN/1.73M2
GLOBULIN SER CALC-MCNC: 2.2 G/DL (CALC) (ref 1.9–3.7)
GLUCOSE SERPL-MCNC: 96 MG/DL (ref 65–99)
HDLC SERPL-MCNC: 35 MG/DL
LDLC SERPL CALC-MCNC: 92 MG/DL (CALC)
NONHDLC SERPL-MCNC: 124 MG/DL (CALC)
POTASSIUM SERPL-SCNC: 4.2 MMOL/L (ref 3.5–5.3)
PROT SERPL-MCNC: 6.7 G/DL (ref 6.1–8.1)
PTH-INTACT SERPL-MCNC: 86 PG/ML (ref 16–77)
SODIUM SERPL-SCNC: 141 MMOL/L (ref 135–146)
TRIGL SERPL-MCNC: 232 MG/DL

## 2023-01-02 DIAGNOSIS — G47.09 OTHER INSOMNIA: ICD-10-CM

## 2023-01-02 RX ORDER — TRAZODONE HYDROCHLORIDE 50 MG/1
TABLET ORAL
Qty: 60 TABLET | Refills: 1 | Status: SHIPPED | OUTPATIENT
Start: 2023-01-02

## 2023-02-04 DIAGNOSIS — K21.9 CHRONIC GERD: ICD-10-CM

## 2023-02-04 RX ORDER — ESOMEPRAZOLE MAGNESIUM 40 MG/1
CAPSULE, DELAYED RELEASE ORAL
Qty: 30 CAPSULE | Refills: 6 | Status: SHIPPED | OUTPATIENT
Start: 2023-02-04

## 2023-02-24 ENCOUNTER — TELEPHONE (OUTPATIENT)
Dept: GASTROENTEROLOGY | Facility: CLINIC | Age: 58
End: 2023-02-24

## 2023-02-24 NOTE — TELEPHONE ENCOUNTER
Scheduled date of EGD/colonoscopy (as of today):  06/19/2023    Physician performing EGD/colonoscopy: Dr Donn Lyons    Location of EGD/colonoscopy: [facility]    Clearances: N/A saturation while awake was 96%. Lowest saturation was 85%. The patient spent less than 1% of the time  with saturation less than 90%. HEART RATE SUMMARY:  Average heart rate while awake was 60 beats per  minute. Maximum heart rate during sleep was 77 beats per minute, and  minimum heart rate was 48 beats per minute. There were no ectopic  beats. MISCELLANEOUS:  [place] Sleepiness Scale score is 13/24. Snoring was  moderate and eliminated with positive airway pressure. There was no  apparent bruxism. IMPRESSION:  1. Known obstructive sleep apnea. 2.  Good-to-optimal titration with CPAP. 3.  Snoring eliminated. 4.  No cardiac dysrhythmia. DISCUSSION:  On a previous study, this patient was found to have  obstructive sleep apnea. With titration of CPAP, which the patient  tolerated well, good-to-optimal results (depending upon whether 3% or 4%  desaturation for hypopnea it is used) were achieved. There was  normalization of oxygen saturation, significant improvement and/or  normalization of the apnea/hypopnea index, and elimination of snoring. PLAN  1. CPAP at 10 cm water pressure. 2.  Respironics DreamWear full facemask, size medium wide with headgear  and heated humidification. EPR of 3 should also be used. 3.  The patient to be seen in the office in 6 to 10 weeks.         Red Nissen, MD  [employer]    D: 03/16/2020 17:59:21       T: 03/16/2020 18:02:43     [email or web address]  Job#: [id number]     Doc#: [id number]    CC:

## 2023-02-28 DIAGNOSIS — G47.09 OTHER INSOMNIA: ICD-10-CM

## 2023-02-28 RX ORDER — TRAZODONE HYDROCHLORIDE 50 MG/1
TABLET ORAL
Qty: 60 TABLET | Refills: 1 | Status: SHIPPED | OUTPATIENT
Start: 2023-02-28

## 2023-04-07 DIAGNOSIS — E78.5 HYPERLIPIDEMIA, UNSPECIFIED HYPERLIPIDEMIA TYPE: ICD-10-CM

## 2023-04-07 RX ORDER — ROSUVASTATIN CALCIUM 5 MG/1
TABLET, COATED ORAL
Qty: 30 TABLET | Refills: 5 | Status: SHIPPED | OUTPATIENT
Start: 2023-04-07

## 2023-05-04 DIAGNOSIS — G47.09 OTHER INSOMNIA: ICD-10-CM

## 2023-05-04 RX ORDER — TRAZODONE HYDROCHLORIDE 50 MG/1
TABLET ORAL
Qty: 60 TABLET | Refills: 1 | Status: SHIPPED | OUTPATIENT
Start: 2023-05-04

## 2023-06-06 ENCOUNTER — HOSPITAL ENCOUNTER (OUTPATIENT)
Dept: MAMMOGRAPHY | Facility: IMAGING CENTER | Age: 58
Discharge: HOME | End: 2023-06-06
Payer: COMMERCIAL

## 2023-06-06 VITALS — BODY MASS INDEX: 30.73 KG/M2 | HEIGHT: 64 IN | WEIGHT: 180 LBS

## 2023-06-06 DIAGNOSIS — Z12.31 ENCOUNTER FOR SCREENING MAMMOGRAM FOR MALIGNANT NEOPLASM OF BREAST: ICD-10-CM

## 2023-06-06 PROCEDURE — 77067 SCR MAMMO BI INCL CAD: CPT

## 2023-06-06 PROCEDURE — 77063 BREAST TOMOSYNTHESIS BI: CPT

## 2023-06-16 RX ORDER — SODIUM CHLORIDE 9 MG/ML
125 INJECTION, SOLUTION INTRAVENOUS CONTINUOUS
Status: CANCELLED | OUTPATIENT
Start: 2023-06-16

## 2023-06-19 ENCOUNTER — ANESTHESIA EVENT (OUTPATIENT)
Dept: GASTROENTEROLOGY | Facility: MEDICAL CENTER | Age: 58
End: 2023-06-19

## 2023-06-19 ENCOUNTER — HOSPITAL ENCOUNTER (OUTPATIENT)
Dept: GASTROENTEROLOGY | Facility: MEDICAL CENTER | Age: 58
Setting detail: OUTPATIENT SURGERY
Discharge: HOME | End: 2023-06-19
Attending: INTERNAL MEDICINE
Payer: COMMERCIAL

## 2023-06-19 ENCOUNTER — ANESTHESIA (OUTPATIENT)
Dept: GASTROENTEROLOGY | Facility: MEDICAL CENTER | Age: 58
End: 2023-06-19

## 2023-06-19 VITALS
RESPIRATION RATE: 18 BRPM | HEIGHT: 64 IN | SYSTOLIC BLOOD PRESSURE: 122 MMHG | TEMPERATURE: 97.8 F | DIASTOLIC BLOOD PRESSURE: 76 MMHG | OXYGEN SATURATION: 98 % | WEIGHT: 183 LBS | BODY MASS INDEX: 31.24 KG/M2 | HEART RATE: 57 BPM

## 2023-06-19 DIAGNOSIS — K21.9 GASTROESOPHAGEAL REFLUX DISEASE WITHOUT ESOPHAGITIS: ICD-10-CM

## 2023-06-19 DIAGNOSIS — K22.70 BARRETT'S ESOPHAGUS WITHOUT DYSPLASIA: ICD-10-CM

## 2023-06-19 DIAGNOSIS — Z12.11 SCREENING FOR COLON CANCER: ICD-10-CM

## 2023-06-19 PROCEDURE — 43239 EGD BIOPSY SINGLE/MULTIPLE: CPT | Performed by: INTERNAL MEDICINE

## 2023-06-19 PROCEDURE — 88305 TISSUE EXAM BY PATHOLOGIST: CPT | Performed by: PATHOLOGY

## 2023-06-19 PROCEDURE — 45385 COLONOSCOPY W/LESION REMOVAL: CPT | Performed by: INTERNAL MEDICINE

## 2023-06-19 RX ORDER — PROPOFOL 10 MG/ML
INJECTION, EMULSION INTRAVENOUS CONTINUOUS PRN
Status: DISCONTINUED | OUTPATIENT
Start: 2023-06-19 | End: 2023-06-19

## 2023-06-19 RX ORDER — SODIUM CHLORIDE 9 MG/ML
125 INJECTION, SOLUTION INTRAVENOUS CONTINUOUS
Status: DISCONTINUED | OUTPATIENT
Start: 2023-06-19 | End: 2023-06-23 | Stop reason: HOSPADM

## 2023-06-19 RX ORDER — LIDOCAINE HCL/PF 100 MG/5ML
SYRINGE (ML) INJECTION AS NEEDED
Status: DISCONTINUED | OUTPATIENT
Start: 2023-06-19 | End: 2023-06-19

## 2023-06-19 RX ORDER — SIMETHICONE 20 MG/.3ML
EMULSION ORAL AS NEEDED
Status: COMPLETED | OUTPATIENT
Start: 2023-06-19 | End: 2023-06-19

## 2023-06-19 RX ORDER — PROPOFOL 10 MG/ML
INJECTION, EMULSION INTRAVENOUS AS NEEDED
Status: DISCONTINUED | OUTPATIENT
Start: 2023-06-19 | End: 2023-06-19

## 2023-06-19 RX ADMIN — PROPOFOL 20 MG: 10 INJECTION, EMULSION INTRAVENOUS at 08:09

## 2023-06-19 RX ADMIN — PROPOFOL 30 MG: 10 INJECTION, EMULSION INTRAVENOUS at 08:24

## 2023-06-19 RX ADMIN — Medication 40 MG: at 08:25

## 2023-06-19 RX ADMIN — PROPOFOL 120 MCG/KG/MIN: 10 INJECTION, EMULSION INTRAVENOUS at 08:16

## 2023-06-19 RX ADMIN — PROPOFOL 130 MG: 10 INJECTION, EMULSION INTRAVENOUS at 08:07

## 2023-06-19 RX ADMIN — PROPOFOL 30 MG: 10 INJECTION, EMULSION INTRAVENOUS at 08:11

## 2023-06-19 RX ADMIN — PROPOFOL 20 MG: 10 INJECTION, EMULSION INTRAVENOUS at 08:14

## 2023-06-19 RX ADMIN — LIDOCAINE HYDROCHLORIDE 100 MG: 20 INJECTION INTRAVENOUS at 08:07

## 2023-06-19 RX ADMIN — SODIUM CHLORIDE 125 ML/HR: 0.9 INJECTION, SOLUTION INTRAVENOUS at 07:49

## 2023-06-19 NOTE — H&P
History and Physical -  [facility]  Roxi Colon 61 y o  female MRN: [id number]    HPI: Roxi Colon is a 61y o  year old female who presents with GERD and BE (last EGD in 2019)  H/o colon polyps and family h /o colon cancer (father) - last colon in 2017  Review of Systems    Historical Information   Past Medical History:   Diagnosis Date   • Abdominal pain    • Anxiety    • Back pain    • Back pain    • Saucedo's esophagus    • Bilateral ovarian cysts    • Cyst of buttocks    • Diverticulosis    • GERD (gastroesophageal reflux disease)    • Hepatic steatosis    • Hyperlipidemia    • IBS (irritable bowel syndrome)    • IBS (irritable bowel syndrome)    • Liver disease     fatty liver   • Neuroma    • Pink eye disease of both eyes     Chronic and was recently treated   • Seasonal allergies    • Squamous cell carcinoma 2018    Neck     Past Surgical History:   Procedure Laterality Date   • COLONOSCOPY  06/02/2017   • ESOPHAGOGASTRODUODENOSCOPY     • LIPOMA RESECTION N/A 08/30/2017    Procedure: EXCISION GLUTEAL MASS;  Surgeon: Severa Gains, MD;  [facility];  Service: General   • MOHS SURGERY Left     neck   • NERVE REPAIR Right 11/17/2017    Procedure: 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 (FROZEN SECTION); Surgeon: Leyla Griffith MD;  [facility];  Service: Neurosurgery   • [place] COLONOSCOPY FLX DX W/COLLJ SPEC WHEN PFRMD N/A 06/02/2017    Procedure: EGD AND COLONOSCOPY;  Surgeon: Ramsey De Leon MD;  [facility];   Service: Gastroenterology   • UPPER GASTROINTESTINAL ENDOSCOPY  08/06/2019   • WISDOM TOOTH EXTRACTION       Social History   Social History     Substance and Sexual Activity   Alcohol Use Yes    Comment: 5 monthly; socially     Social History     Substance and Sexual Activity   Drug Use No     Social History     Tobacco Use   Smoking Status Never   Smokeless Tobacco Never     Family History   Problem Relation Age of Onset   • Colon "polyps Mother    • Thyroid disease Mother    • Cancer Father    • Colon cancer Father 61   • Dementia Father    • No Known Problems Daughter    • No Known Problems Maternal Grandmother    • No Known Problems Maternal Grandfather    • No Known Problems Paternal Grandmother    • No Known Problems Paternal Grandfather        Meds/Allergies     (Not in a hospital admission)      Allergies   Allergen Reactions   • Pollen Extract    • Sulfa Antibiotics Rash       Objective     /76   Pulse 67   Temp 97 8 °F (36 6 °C) (Temporal)   Resp 16   Ht 5' 3 5\" (1 613 m)   Wt 83 kg (183 lb)   SpO2 96%   BMI 31 91 kg/m²       PHYSICAL EXAM    Gen: NAD  CV: RRR  CHEST: Clear  ABD: soft, NT/ND  EXT: no edema  Neuro: AAO      ASSESSMENT/PLAN:  This is a 61y o  year old female here for EGD for GERd/ BE and colonoscopy for h/o colon polyps and family h/o colon cancer       PLAN:   Procedure: EGD/ Colonoscopy      "

## 2023-06-19 NOTE — ANESTHESIA POSTPROCEDURE EVALUATION
"Post-Op Assessment Note    CV Status:  Stable    Pain management: adequate     Mental Status:  Alert and awake   Hydration Status:  Euvolemic   PONV Controlled:  Controlled   Airway Patency:  Patent   Two or more mitigation strategies used for obstructive sleep apnea   Post Op Vitals Reviewed: Yes      Staff: Anesthesiologist         No notable events documented      BP      Temp      Pulse     Resp      SpO2      /76   Pulse 57   Temp 97 8 °F (36 6 °C) (Temporal)   Resp 18   Ht 5' 3 5\" (1 613 m)   Wt 83 kg (183 lb)   SpO2 98%   BMI 31 91 kg/m²     "

## 2023-06-19 NOTE — ANESTHESIA PREPROCEDURE EVALUATION
Procedure:  COLONOSCOPY  EGD    Relevant Problems   ANESTHESIA (within normal limits)      CARDIO   (+) Hyperlipemia      GI/HEPATIC   (+) Esophageal reflux   (+) Hepatic steatosis      MUSCULOSKELETAL   (+) 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      NEURO/PSYCH   (+) Generalized anxiety disorder      Other   (+) Neurofibroma        Physical Exam    Airway    Mallampati score: II  TM Distance: >3 FB  Neck ROM: full     Dental   No notable dental hx     Cardiovascular  Rhythm: regular, Rate: normal, Cardiovascular exam normal    Pulmonary  Pulmonary exam normal Breath sounds clear to auscultation,     Other Findings        Anesthesia Plan  ASA Score- 2     Anesthesia Type- IV sedation with anesthesia with ASA Monitors  Additional Monitors:   Airway Plan:           Plan Factors-Exercise tolerance (METS): >4 METS  Chart reviewed  Existing labs reviewed  Patient summary reviewed  Patient is not a current smoker  Induction- intravenous  Postoperative Plan-     Informed Consent- Anesthetic plan and risks discussed with patient

## 2023-06-26 NOTE — RESULT ENCOUNTER NOTE
Inform patient via [employer]  Please review the pathology/lab result of further discussion  Copied from [employer] message :       [address],     Your esophagus biopsies showed Saucedo's esophagus which is a precancerous area secondary to reflux  I would recommend to continue with the Nexium and follow-up with repeat endoscopy in the next 3 to 5 years  The colon polyp was benign but precancerous  He will need a repeat colonoscopy again in 3 to 5 years  I would also recommend a follow-up in the office    Best regards,     Placido Barahona MD

## 2023-07-10 ENCOUNTER — TELEPHONE (OUTPATIENT)
Dept: NEUROSURGERY | Facility: CLINIC | Age: 58
End: 2023-07-10

## 2023-07-10 DIAGNOSIS — Z01.818 PRE-PROCEDURAL EXAMINATION: ICD-10-CM

## 2023-07-10 DIAGNOSIS — D21.9 MYXOMA DETERMINED BY BIOPSY OF MUSCLE: Primary | ICD-10-CM

## 2023-07-10 NOTE — TELEPHONE ENCOUNTER
Received call on nurseline from patient stating she is due for 5 yr follow up as she is s/p myxoma resection. MRI order placed by Dr. Ethel Bush in 2018 , this RN placed new order. Patient will schedule MRI and call back to schedule appt with AP on  day. All questions answered at this time, she was appreciative of the callback.

## 2023-07-12 ENCOUNTER — TELEPHONE (OUTPATIENT)
Dept: FAMILY MEDICINE CLINIC | Facility: CLINIC | Age: 58
End: 2023-07-12

## 2023-07-12 DIAGNOSIS — K22.70 BARRETT'S ESOPHAGUS WITHOUT DYSPLASIA: Primary | ICD-10-CM

## 2023-07-12 DIAGNOSIS — G47.09 OTHER INSOMNIA: ICD-10-CM

## 2023-07-12 DIAGNOSIS — E34.9 INCREASED PTH LEVEL: ICD-10-CM

## 2023-07-12 DIAGNOSIS — E78.5 HYPERLIPIDEMIA, UNSPECIFIED HYPERLIPIDEMIA TYPE: ICD-10-CM

## 2023-07-12 PROBLEM — R79.89 INCREASED PTH LEVEL: Status: ACTIVE | Noted: 2023-07-12

## 2023-07-12 RX ORDER — TRAZODONE HYDROCHLORIDE 50 MG/1
TABLET ORAL
Qty: 60 TABLET | Refills: 2 | Status: SHIPPED | OUTPATIENT
Start: 2023-07-12

## 2023-07-12 NOTE — TELEPHONE ENCOUNTER
Please contact patient. She will be due for annual well exam in early September. She is due for routine blood work as well. Orders placed. I just refilled her medication for sleep.   Thank you

## 2023-07-24 LAB
25(OH)D3 SERPL-MCNC: 56 NG/ML (ref 30–100)
ALBUMIN SERPL-MCNC: 4.5 G/DL (ref 3.6–5.1)
ALBUMIN/GLOB SERPL: 2 (CALC) (ref 1–2.5)
ALP SERPL-CCNC: 50 U/L (ref 37–153)
ALT SERPL-CCNC: 11 U/L (ref 6–29)
AST SERPL-CCNC: 12 U/L (ref 10–35)
BASOPHILS # BLD AUTO: 30 CELLS/UL (ref 0–200)
BASOPHILS NFR BLD AUTO: 0.6 %
BILIRUB SERPL-MCNC: 0.4 MG/DL (ref 0.2–1.2)
BUN SERPL-MCNC: 23 MG/DL (ref 7–25)
BUN/CREAT SERPL: ABNORMAL (CALC) (ref 6–22)
CA-I SERPL-MCNC: 5.4 MG/DL (ref 4.7–5.5)
CALCIUM SERPL-MCNC: 9.9 MG/DL (ref 8.6–10.4)
CALCIUM SERPL-MCNC: 9.9 MG/DL (ref 8.6–10.4)
CHLORIDE SERPL-SCNC: 105 MMOL/L (ref 98–110)
CHOLEST SERPL-MCNC: 184 MG/DL
CHOLEST/HDLC SERPL: 4.4 (CALC)
CO2 SERPL-SCNC: 24 MMOL/L (ref 20–32)
CREAT SERPL-MCNC: 0.68 MG/DL (ref 0.5–1.03)
EOSINOPHIL # BLD AUTO: 120 CELLS/UL (ref 15–500)
EOSINOPHIL NFR BLD AUTO: 2.4 %
ERYTHROCYTE [DISTWIDTH] IN BLOOD BY AUTOMATED COUNT: 13.2 % (ref 11–15)
GFR/BSA.PRED SERPLBLD CYS-BASED-ARV: 101 ML/MIN/1.73M2
GLOBULIN SER CALC-MCNC: 2.2 G/DL (CALC) (ref 1.9–3.7)
GLUCOSE SERPL-MCNC: 106 MG/DL (ref 65–99)
HCT VFR BLD AUTO: 39.9 % (ref 35–45)
HDLC SERPL-MCNC: 42 MG/DL
HGB BLD-MCNC: 13.3 G/DL (ref 11.7–15.5)
LDLC SERPL CALC-MCNC: 114 MG/DL (CALC)
LYMPHOCYTES # BLD AUTO: 1335 CELLS/UL (ref 850–3900)
LYMPHOCYTES NFR BLD AUTO: 26.7 %
MCH RBC QN AUTO: 28.7 PG (ref 27–33)
MCHC RBC AUTO-ENTMCNC: 33.3 G/DL (ref 32–36)
MCV RBC AUTO: 86 FL (ref 80–100)
MONOCYTES # BLD AUTO: 425 CELLS/UL (ref 200–950)
MONOCYTES NFR BLD AUTO: 8.5 %
NEUTROPHILS # BLD AUTO: 3090 CELLS/UL (ref 1500–7800)
NEUTROPHILS NFR BLD AUTO: 61.8 %
NONHDLC SERPL-MCNC: 142 MG/DL (CALC)
PLATELET # BLD AUTO: 206 THOUSAND/UL (ref 140–400)
PMV BLD REES-ECKER: 10.8 FL (ref 7.5–12.5)
POTASSIUM SERPL-SCNC: 4.1 MMOL/L (ref 3.5–5.3)
PROT SERPL-MCNC: 6.7 G/DL (ref 6.1–8.1)
PTH-INTACT SERPL-MCNC: 76 PG/ML (ref 16–77)
RBC # BLD AUTO: 4.64 MILLION/UL (ref 3.8–5.1)
SODIUM SERPL-SCNC: 139 MMOL/L (ref 135–146)
TRIGL SERPL-MCNC: 168 MG/DL
TSH SERPL-ACNC: 2.13 MIU/L (ref 0.4–4.5)
WBC # BLD AUTO: 5 THOUSAND/UL (ref 3.8–10.8)

## 2023-07-31 ENCOUNTER — HOSPITAL ENCOUNTER (OUTPATIENT)
Dept: MRI IMAGING | Facility: HOSPITAL | Age: 58
Discharge: HOME | End: 2023-07-31
Attending: NEUROLOGICAL SURGERY
Payer: COMMERCIAL

## 2023-07-31 DIAGNOSIS — D21.9 MYXOMA DETERMINED BY BIOPSY OF MUSCLE: ICD-10-CM

## 2023-07-31 PROCEDURE — A9585 GADOBUTROL INJECTION: HCPCS | Performed by: NEUROLOGICAL SURGERY

## 2023-07-31 PROCEDURE — 72197 MRI PELVIS W/O & W/DYE: CPT

## 2023-07-31 PROCEDURE — G1004 CDSM NDSC: HCPCS

## 2023-07-31 RX ADMIN — GADOBUTROL 8 ML: 604.72 INJECTION INTRAVENOUS at 18:03

## 2023-08-01 ENCOUNTER — HOSPITAL ENCOUNTER (OUTPATIENT)
Dept: MAMMOGRAPHY | Facility: CLINIC | Age: 58
Discharge: HOME | End: 2023-08-01
Payer: COMMERCIAL

## 2023-08-01 ENCOUNTER — HOSPITAL ENCOUNTER (OUTPATIENT)
Dept: ULTRASOUND IMAGING | Facility: CLINIC | Age: 58
Discharge: HOME | End: 2023-08-01
Payer: COMMERCIAL

## 2023-08-01 VITALS — WEIGHT: 183 LBS | BODY MASS INDEX: 31.24 KG/M2 | HEIGHT: 64 IN

## 2023-08-01 DIAGNOSIS — R92.8 ABNORMAL SCREENING MAMMOGRAM: ICD-10-CM

## 2023-08-01 PROCEDURE — G0279 TOMOSYNTHESIS, MAMMO: HCPCS

## 2023-08-01 PROCEDURE — 77065 DX MAMMO INCL CAD UNI: CPT

## 2023-08-01 PROCEDURE — 76642 ULTRASOUND BREAST LIMITED: CPT

## 2023-09-07 DIAGNOSIS — K21.9 CHRONIC GERD: ICD-10-CM

## 2023-09-07 RX ORDER — ESOMEPRAZOLE MAGNESIUM 40 MG/1
CAPSULE, DELAYED RELEASE ORAL
Qty: 30 CAPSULE | Refills: 6 | Status: SHIPPED | OUTPATIENT
Start: 2023-09-07

## 2023-09-19 ENCOUNTER — OFFICE VISIT (OUTPATIENT)
Dept: NEUROSURGERY | Facility: CLINIC | Age: 58
End: 2023-09-19
Payer: COMMERCIAL

## 2023-09-19 ENCOUNTER — OFFICE VISIT (OUTPATIENT)
Dept: FAMILY MEDICINE CLINIC | Facility: CLINIC | Age: 58
End: 2023-09-19
Payer: COMMERCIAL

## 2023-09-19 ENCOUNTER — TELEPHONE (OUTPATIENT)
Dept: NEUROSURGERY | Facility: CLINIC | Age: 58
End: 2023-09-19

## 2023-09-19 VITALS
HEIGHT: 64 IN | TEMPERATURE: 97.5 F | HEART RATE: 81 BPM | OXYGEN SATURATION: 96 % | DIASTOLIC BLOOD PRESSURE: 80 MMHG | WEIGHT: 177 LBS | RESPIRATION RATE: 16 BRPM | BODY MASS INDEX: 30.22 KG/M2 | SYSTOLIC BLOOD PRESSURE: 108 MMHG

## 2023-09-19 VITALS
TEMPERATURE: 98.2 F | RESPIRATION RATE: 19 BRPM | OXYGEN SATURATION: 80 % | SYSTOLIC BLOOD PRESSURE: 124 MMHG | BODY MASS INDEX: 31.38 KG/M2 | WEIGHT: 177.1 LBS | HEIGHT: 63 IN | DIASTOLIC BLOOD PRESSURE: 80 MMHG | HEART RATE: 80 BPM

## 2023-09-19 DIAGNOSIS — Z00.00 ENCOUNTER FOR WELLNESS EXAMINATION IN ADULT: Primary | ICD-10-CM

## 2023-09-19 DIAGNOSIS — D21.9 MYXOMA DETERMINED BY BIOPSY OF MUSCLE: Primary | ICD-10-CM

## 2023-09-19 DIAGNOSIS — M77.8 TENDINITIS OF RIGHT WRIST: ICD-10-CM

## 2023-09-19 DIAGNOSIS — R92.8 ABNORMAL FINDING ON BREAST IMAGING: ICD-10-CM

## 2023-09-19 DIAGNOSIS — E78.2 MIXED HYPERLIPIDEMIA: ICD-10-CM

## 2023-09-19 DIAGNOSIS — K22.70 BARRETT'S ESOPHAGUS WITHOUT DYSPLASIA: ICD-10-CM

## 2023-09-19 DIAGNOSIS — G47.09 OTHER INSOMNIA: ICD-10-CM

## 2023-09-19 PROCEDURE — 99203 OFFICE O/P NEW LOW 30 MIN: CPT | Performed by: NEUROLOGICAL SURGERY

## 2023-09-19 PROCEDURE — 99396 PREV VISIT EST AGE 40-64: CPT | Performed by: FAMILY MEDICINE

## 2023-09-19 RX ORDER — METHYLPREDNISOLONE 4 MG/1
TABLET ORAL
Qty: 21 EACH | Refills: 0 | Status: SHIPPED | OUTPATIENT
Start: 2023-09-19

## 2023-09-19 NOTE — PROGRESS NOTES
DISCUSSION SUMMARY   This is a 60 y.o. 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 There is no sign of recurrence on the current studies at 5 years. I recommended another study in 10 years or sooner should she have any new symptoms. Return in about 10 years (around 9/19/2033). Diagnosis ICD-10-CM Associated Orders   1. 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  D21.9            Chief Complaint   Patient presents with   • Follow-up        HPI this is a 53-year-old female who had a mass presumed to be of the lumbosacral plexus. Complete excisional biopsy revealed it to be tightly adherent to the nerve but pathology revealed an intramuscular myxoma. The patient has very rare symptomatology if at all and most often has no symptoms. She forgot which leg it was in. Review of Systems   Constitutional: Negative. HENT: Negative. Eyes: Negative. Respiratory: Negative. Cardiovascular: Negative. Gastrointestinal: Negative. Endocrine: Negative. Genitourinary: Negative. Musculoskeletal: Negative for back pain, gait problem and myalgias. Neurological: Positive for numbness (ocassiojnal tingle right buttoclk surgergy site patient stes it feels like a eye twitch). Hematological: Negative. Psychiatric/Behavioral: Negative.     I reviewed the ROS    Vitals:    /80 (BP Location: Right arm, Patient Position: Sitting, Cuff Size: Standard)   Pulse 80   Temp 98.2 °F (36.8 °C) (Temporal)   Resp 19   Ht 5' 3" (1.6 m)   Wt 80.3 kg (177 lb 1.6 oz)   SpO2 (!) 80%   BMI 31.37 kg/m²     MEDICAL HISTORY  Past Medical History:   Diagnosis Date   • Abdominal pain    • Allergic 2003   • Anxiety    • Back pain    • Back pain    • Saucedo's esophagus    • Bilateral ovarian cysts    • Cyst of buttocks    • Diverticulosis    • GERD (gastroesophageal reflux disease)    • Hepatic steatosis    • Hyperlipidemia    • IBS (irritable bowel syndrome)    • IBS (irritable bowel syndrome)    • Liver disease     fatty liver   • Neuroma    • Pink eye disease of both eyes     Chronic and was recently treated   • Seasonal allergies    • Squamous cell carcinoma 2018    Neck     Past Surgical History:   Procedure Laterality Date   • COLONOSCOPY  06/02/2017   • ESOPHAGOGASTRODUODENOSCOPY     • LIPOMA RESECTION N/A 08/30/2017    Procedure: EXCISION GLUTEAL MASS;  Surgeon: Shy Morris MD;  [facility];  Service: General   • MOHS SURGERY Left     neck   • NERVE REPAIR Right 11/17/2017    Procedure: 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 (FROZEN SECTION); Surgeon: Eliz Wong MD;  [facility];  Service: Neurosurgery   • [place] COLONOSCOPY FLX DX W/COLLJ SPEC WHEN PFRMD N/A 06/02/2017    Procedure: EGD AND COLONOSCOPY;  Surgeon: Lorena Griggs MD;  [facility];   Service: Gastroenterology   • UPPER GASTROINTESTINAL ENDOSCOPY  08/06/2019   • WISDOM TOOTH EXTRACTION       Social History     Tobacco Use   • Smoking status: Never   • Smokeless tobacco: Never   Vaping Use   • Vaping Use: Never used   Substance Use Topics   • Alcohol use: Yes     Comment: Casual/social   • Drug use: No      Family History   Problem Relation Age of Onset   • Colon polyps Mother    • Thyroid disease Mother    • Cancer Father         Colon   • Colon cancer Father 61   • Dementia Father         FTLD   • Coronary artery disease Father    • No Known Problems Daughter    • No Known Problems Maternal Grandmother    • No Known Problems Maternal Grandfather    • No Known Problems Paternal Grandmother    • No Known Problems Paternal Grandfather         Current Outpatient Medications:   •  cetirizine (ZyrTEC) 10 mg tablet, Take 10 mg by mouth daily, Disp: , Rfl:   •  Cholecalciferol (VITAMIN D3 PO), Take by mouth daily, Disp: , Rfl:   •  esomeprazole (NexIUM) 40 MG capsule, TAKE ONE CAPSULE BY MOUTH EVERY DAY, Disp: 30 capsule, Rfl: 6  • mometasone (NASONEX) 50 mcg/act nasal spray, 2 sprays into each nostril daily, Disp: 17 g, Rfl: 11  •  rosuvastatin (CRESTOR) 5 mg tablet, TAKE ONE TABLET BY MOUTH EVERY DAY, Disp: 30 tablet, Rfl: 5  •  traZODone (DESYREL) 50 mg tablet, TAKE 1 TO 2 TABLETS BY MOUTH AT BEDTIME AS NEEDED FOR INSOMNIA, Disp: 60 tablet, Rfl: 2  •  methylPREDNISolone 4 MG tablet therapy pack, Use as directed on package (Patient not taking: Reported on 9/19/2023), Disp: 21 each, Rfl: 0     Allergies   Allergen Reactions   • Pollen Extract    • Sulfa Antibiotics Rash        The following portions of the patient's history were updated by MA and reviewed by MD: allergies, current medications, past family history, past medical history, past social history, past surgical history and problem list.    Physical Exam  Vitals and nursing note reviewed. Constitutional:       General: She is not in acute distress. Appearance: Normal appearance. She is not ill-appearing, toxic-appearing or diaphoretic. HENT:      Head: Normocephalic. Nose: Nose normal.   Eyes:      Extraocular Movements: Extraocular movements intact. Pupils: Pupils are equal, round, and reactive to light. Musculoskeletal:         General: No swelling, tenderness, deformity or signs of injury. Normal range of motion. Cervical back: Normal range of motion and neck supple. No rigidity. Right lower leg: No edema. Left lower leg: No edema. Comments: Range of motion of the legs for flexion and extension was equal bilaterally   Lymphadenopathy:      Cervical: No cervical adenopathy. Skin:     General: Skin is warm and dry. Coloration: Skin is not jaundiced. Findings: No erythema or rash. Neurological:      General: No focal deficit present. Mental Status: She is alert and oriented to person, place, and time. Cranial Nerves: No cranial nerve deficit. Sensory: No sensory deficit. Motor: No weakness.       Coordination: Coordination normal.      Gait: Gait normal.      Deep Tendon Reflexes: Reflexes normal.   Psychiatric:         Mood and Affect: Mood normal.         Behavior: Behavior normal.         Thought Content: Thought content normal.         Judgment: Judgment normal.         RESULTS/DATA  I have personally reviewed pertinent films in PACS MRI of the pelvis with attention to the gluteus brenda demonstrates a complete resection of the schwannoma without signs of recurrence.

## 2023-09-19 NOTE — PROGRESS NOTES
Name: Magy Sheriff      : 1965      MRN: [id number]  Encounter Provider: Nancy Chen MD  Encounter Date: 2023   Encounter department: [address] [place] [address]     1. Encounter for wellness examination in adult    2. Saucedo's esophagus without dysplasia  Assessment & Plan:  Recent EGD,   Repeat 5 years  [facility] follows  Continue Nexium      3. Tendinitis of right wrist  -     methylPREDNISolone 4 MG tablet therapy pack; Use as directed on package (Patient not taking: Reported on 2023)    4. Abnormal finding on breast imaging  -     Mammo diagnostic left w 3d & cad; Future; Expected date: 2024  -      breast left limited (diagnostic); Future; Expected date: 2024    5. Mixed hyperlipidemia  Assessment & Plan:  Increase dose of Crestor from 3 days per week to 5 days per week      6. Other insomnia  Assessment & Plan:  Doing well on trazodone        Patient Instructions   4 tablets once a day for 2 days; then 3 tablets once a day for 2 days ;then 2 tablets once a day for 2 days; then 1 tablet once a day for 3 days then stop. 9 day treatment. After food  Please contact your insurance regarding Shingrix vaccine, coverage, doctors office versus pharmacy  Please increase dose of Crestor and take it Monday through Friday, you can skip medication on the weekends  Schedule left breast diagnostic mammogram and ultrasound for early February       We discussed shingles vaccination with the patient. She will check with her insurance and will call us back to schedule appointment with the nurse. Subjective     Annual well exam.      Right  lateral wrist pain  No N/T,ulnar surface,affecting  4-5th MCP area  Soft wrist splint helps     Recent  EGD and colonoscopy 2023  Saucedo's esophagus, patient remains on daily Nexium.   Symptoms are well controlled   advanced to repeat EGD 5 years    Left  Dx mammo 2023   6 month f.up Dx study was recommended  Patient is up-to-date with GYN exam.  Known ovarian cysts, stable, followed by GYN, patient is asymptomatic    Results of recent blood work reviewed      Review of Systems   Constitutional: Negative. HENT: Negative. Eyes: Negative. Respiratory: Negative. Cardiovascular: Negative. Gastrointestinal: Negative. Endocrine: Negative. Genitourinary: Negative. Musculoskeletal: Positive for arthralgias. Skin: Negative. Allergic/Immunologic: Negative. Neurological: Negative. Hematological: Negative. Psychiatric/Behavioral: Negative. Past Medical History:   Diagnosis Date   • Abdominal pain    • Allergic 2003   • Anxiety    • Back pain    • Back pain    • Saucedo's esophagus    • Bilateral ovarian cysts    • Cyst of buttocks    • Diverticulosis    • GERD (gastroesophageal reflux disease)    • Hepatic steatosis    • Hyperlipidemia    • IBS (irritable bowel syndrome)    • IBS (irritable bowel syndrome)    • Liver disease     fatty liver   • Neuroma    • Pink eye disease of both eyes     Chronic and was recently treated   • Seasonal allergies    • Squamous cell carcinoma 2018    Neck     Past Surgical History:   Procedure Laterality Date   • COLONOSCOPY  06/02/2017   • ESOPHAGOGASTRODUODENOSCOPY     • LIPOMA RESECTION N/A 08/30/2017    Procedure: EXCISION GLUTEAL MASS;  Surgeon: Nathan Grigsby MD;  Location: [facility];  Service: General   • MOHS SURGERY Left     neck   • NERVE REPAIR Right 11/17/2017    Procedure: 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 (FROZEN SECTION); Surgeon: Suleiman Higuera MD;  Location: [facility];  Service: Neurosurgery   • [place] COLONOSCOPY FLX DX W/COLLJ SPEC WHEN PFRMD N/A 06/02/2017    Procedure: EGD AND COLONOSCOPY;  Surgeon: Nicole Daily MD;  Location: [facility];   Service: Gastroenterology   • UPPER GASTROINTESTINAL ENDOSCOPY  08/06/2019   • WISDOM TOOTH EXTRACTION       Family History   Problem Relation Age of Onset   • Colon polyps Mother    • Thyroid disease Mother    • Cancer Father         Colon   • Colon cancer Father 61   • Dementia Father         FTLD   • Coronary artery disease Father    • No Known Problems Daughter    • No Known Problems Maternal Grandmother    • No Known Problems Maternal Grandfather    • No Known Problems Paternal Grandmother    • No Known Problems Paternal Grandfather      Social History     Socioeconomic History   • Marital status: /Civil Union     Spouse name: None   • Number of children: 2   • Years of education: None   • Highest education level: None   Occupational History   • None   Tobacco Use   • Smoking status: Never   • Smokeless tobacco: Never   Vaping Use   • Vaping Use: Never used   Substance and Sexual Activity   • Alcohol use: Yes     Comment: Casual/social   • Drug use: No   • Sexual activity: Not Currently     Partners: Male     Birth control/protection: Post-menopausal   Other Topics Concern   • None   Social History Narrative    Lives with spouse     Social Determinants of Health     Financial Resource Strain: Not on file   Food Insecurity: Not on file   Transportation Needs: Not on file   Physical Activity: Not on file   Stress: Not on file   Social Connections: Not on file   Intimate Partner Violence: Not on file   Housing Stability: Not on file     Current Outpatient Medications on File Prior to Visit   Medication Sig   • cetirizine (ZyrTEC) 10 mg tablet Take 10 mg by mouth daily   • Cholecalciferol (VITAMIN D3 PO) Take by mouth daily   • esomeprazole (NexIUM) 40 MG capsule TAKE ONE CAPSULE BY MOUTH EVERY DAY   • mometasone (NASONEX) 50 mcg/act nasal spray 2 sprays into each nostril daily   • rosuvastatin (CRESTOR) 5 mg tablet TAKE ONE TABLET BY MOUTH EVERY DAY   • traZODone (DESYREL) 50 mg tablet TAKE 1 TO 2 TABLETS BY MOUTH AT BEDTIME AS NEEDED FOR INSOMNIA     Allergies   Allergen Reactions   • Pollen Extract    • Sulfa Antibiotics Rash     Immunization History   Administered Date(s) Administered   • COVID-19 PFIZER VACCINE 0.3 ML IM 04/14/2021, 05/05/2021, 01/08/2022   • Influenza Quadrivalent Preservative Free 3 years and older IM 11/07/2014       Objective     /80   Pulse 81   Temp 97.5 °F (36.4 °C) (Temporal)   Resp 16   Ht 5' 3.5" (1.613 m)   Wt 80.3 kg (177 lb)   SpO2 96%   BMI 30.86 kg/m²     Physical Exam  Vitals and nursing note reviewed. Constitutional:       General: She is not in acute distress. Appearance: Normal appearance. She is well-developed. She is not ill-appearing. HENT:      Head: Normocephalic and atraumatic. Eyes:      Conjunctiva/sclera: Conjunctivae normal.   Neck:      Thyroid: No thyromegaly. Vascular: No carotid bruit. Cardiovascular:      Rate and Rhythm: Normal rate and regular rhythm. Heart sounds: Normal heart sounds. No murmur heard. Pulmonary:      Effort: Pulmonary effort is normal. No respiratory distress. Breath sounds: Normal breath sounds. No wheezing. Abdominal:      General: Bowel sounds are normal. There is no abdominal bruit. Palpations: Abdomen is soft. Musculoskeletal:         General: Normal range of motion. Cervical back: Neck supple. Right lower leg: No edema. Left lower leg: No edema. Comments: Right hand and wrist: No discoloration, no swelling, mild pain with extension. Neurological:      General: No focal deficit present. Mental Status: She is alert and oriented to person, place, and time. Cranial Nerves: No cranial nerve deficit. Coordination: Coordination normal.   Psychiatric:         Mood and Affect: Mood normal.         Behavior: Behavior normal.         Thought Content:  Thought content normal.       Po Bryson MD

## 2023-09-19 NOTE — TELEPHONE ENCOUNTER
9/19/23  checked out patient and as per Dr. Yue Tineo patient will return as follows: Follow-up disposition: Return in about 10 years (around 9/19/2033).

## 2023-09-19 NOTE — PATIENT INSTRUCTIONS
4 tablets once a day for 2 days; then 3 tablets once a day for 2 days ;then 2 tablets once a day for 2 days; then 1 tablet once a day for 3 days then stop. 9 day treatment. After food  Please contact your insurance regarding Shingrix vaccine, coverage, doctors office versus pharmacy  Please increase dose of Crestor and take it Monday through Friday, you can skip medication on the weekends  Schedule left breast diagnostic mammogram and ultrasound for early February

## 2023-09-23 PROBLEM — M77.8 TENDINITIS OF RIGHT WRIST: Status: ACTIVE | Noted: 2023-09-23

## 2023-09-23 PROBLEM — E78.2 MIXED HYPERLIPIDEMIA: Chronic | Status: ACTIVE | Noted: 2017-10-06

## 2023-09-23 PROBLEM — E34.9 INCREASED PTH LEVEL: Status: RESOLVED | Noted: 2023-07-12 | Resolved: 2023-09-23

## 2023-09-23 PROBLEM — R79.89 INCREASED PTH LEVEL: Status: RESOLVED | Noted: 2023-07-12 | Resolved: 2023-09-23

## 2023-09-29 DIAGNOSIS — M77.8 TENDINITIS OF RIGHT WRIST: Primary | ICD-10-CM

## 2023-10-03 DIAGNOSIS — R09.89 SINUS COMPLAINT: Primary | ICD-10-CM

## 2023-10-03 RX ORDER — CEFPROZIL 500 MG/1
500 TABLET, FILM COATED ORAL 2 TIMES DAILY
Qty: 14 TABLET | Refills: 0 | Status: SHIPPED | OUTPATIENT
Start: 2023-10-03 | End: 2023-10-10

## 2023-10-04 DIAGNOSIS — E78.5 HYPERLIPIDEMIA, UNSPECIFIED HYPERLIPIDEMIA TYPE: ICD-10-CM

## 2023-10-04 RX ORDER — ROSUVASTATIN CALCIUM 5 MG/1
TABLET, COATED ORAL
Qty: 30 TABLET | Refills: 5 | Status: SHIPPED | OUTPATIENT
Start: 2023-10-04

## 2023-12-07 DIAGNOSIS — G47.09 OTHER INSOMNIA: ICD-10-CM

## 2023-12-07 RX ORDER — TRAZODONE HYDROCHLORIDE 50 MG/1
TABLET ORAL
Qty: 60 TABLET | Refills: 2 | Status: SHIPPED | OUTPATIENT
Start: 2023-12-07

## 2024-02-05 ENCOUNTER — HOSPITAL ENCOUNTER (OUTPATIENT)
Dept: MAMMOGRAPHY | Facility: CLINIC | Age: 59
Discharge: HOME | End: 2024-02-05
Payer: COMMERCIAL

## 2024-02-05 DIAGNOSIS — R92.8 ABNORMAL FINDING ON BREAST IMAGING: ICD-10-CM

## 2024-02-05 PROCEDURE — 77065 DX MAMMO INCL CAD UNI: CPT

## 2024-02-05 PROCEDURE — G0279 TOMOSYNTHESIS, MAMMO: HCPCS

## 2024-02-07 DIAGNOSIS — Z12.31 ENCOUNTER FOR SCREENING MAMMOGRAM FOR BREAST CANCER: Primary | ICD-10-CM

## 2024-03-06 DIAGNOSIS — G47.09 OTHER INSOMNIA: ICD-10-CM

## 2024-03-06 RX ORDER — TRAZODONE HYDROCHLORIDE 50 MG/1
TABLET ORAL
Qty: 60 TABLET | Refills: 2 | Status: SHIPPED | OUTPATIENT
Start: 2024-03-06

## 2024-03-29 ENCOUNTER — OFFICE VISIT (OUTPATIENT)
Dept: URGENT CARE | Facility: CLINIC | Age: 59
End: 2024-03-29
Payer: COMMERCIAL

## 2024-03-29 VITALS
SYSTOLIC BLOOD PRESSURE: 118 MMHG | HEART RATE: 87 BPM | BODY MASS INDEX: 27.64 KG/M2 | DIASTOLIC BLOOD PRESSURE: 78 MMHG | OXYGEN SATURATION: 98 % | WEIGHT: 156 LBS | RESPIRATION RATE: 18 BRPM | HEIGHT: 63 IN | TEMPERATURE: 97.8 F

## 2024-03-29 DIAGNOSIS — R39.9 UTI SYMPTOMS: ICD-10-CM

## 2024-03-29 DIAGNOSIS — N30.00 ACUTE CYSTITIS WITHOUT HEMATURIA: Primary | ICD-10-CM

## 2024-03-29 LAB
SL AMB  POCT GLUCOSE, UA: NEGATIVE
SL AMB LEUKOCYTE ESTERASE,UA: ABNORMAL
SL AMB POCT BILIRUBIN,UA: NEGATIVE
SL AMB POCT BLOOD,UA: ABNORMAL
SL AMB POCT CLARITY,UA: CLEAR
SL AMB POCT COLOR,UA: ABNORMAL
SL AMB POCT KETONES,UA: NEGATIVE
SL AMB POCT NITRITE,UA: NEGATIVE
SL AMB POCT PH,UA: 6
SL AMB POCT SPECIFIC GRAVITY,UA: 1.01
SL AMB POCT URINE PROTEIN: 100
SL AMB POCT UROBILINOGEN: 0.2

## 2024-03-29 PROCEDURE — 81002 URINALYSIS NONAUTO W/O SCOPE: CPT

## 2024-03-29 PROCEDURE — G0382 LEV 3 HOSP TYPE B ED VISIT: HCPCS

## 2024-03-29 PROCEDURE — 87077 CULTURE AEROBIC IDENTIFY: CPT

## 2024-03-29 PROCEDURE — 87181 SC STD AGAR DILUTION PER AGT: CPT

## 2024-03-29 PROCEDURE — 87086 URINE CULTURE/COLONY COUNT: CPT

## 2024-03-29 PROCEDURE — 87186 SC STD MICRODIL/AGAR DIL: CPT

## 2024-03-29 RX ORDER — NITROFURANTOIN 25; 75 MG/1; MG/1
100 CAPSULE ORAL 2 TIMES DAILY
Qty: 10 CAPSULE | Refills: 0 | Status: SHIPPED | OUTPATIENT
Start: 2024-03-29 | End: 2024-04-03

## 2024-03-29 NOTE — PROGRESS NOTES
[facility]        NAME: Andree Yap is a 58 y.o. female  : 1965    MRN: [id number]  DATE: 2024  TIME: 1:41 PM    Assessment and Plan   Acute cystitis without hematuria [N30.00]  1. Acute cystitis without hematuria  nitrofurantoin (MACROBID) 100 mg capsule      2. UTI symptoms  POCT urine dip    Urine culture            Patient Instructions       Follow up with PCP in 3-5 days.  Proceed to  ER if symptoms worsen.    If tests have been performed at [facility], our office will contact you with results if changes need to be made to the care plan discussed with you at the visit.  You can review your full results on [facility] MyChart.    Chief Complaint     Chief Complaint   Patient presents with    Possible UTI     Patient reports having symptoms for the past 2 days with increase in frequency and urgency, along with burning after urination.         History of Present Illness       58-year-old female presents for UTI symptoms x 2 days.  Patient admits to dysuria, frequency, urgency.        Review of Systems   Review of Systems   Constitutional:  Negative for chills and fever.   Gastrointestinal:  Negative for abdominal pain, nausea and vomiting.   Genitourinary:  Positive for dysuria, frequency and urgency. Negative for flank pain, hematuria and pelvic pain.         Current Medications       Current Outpatient Medications:     nitrofurantoin (MACROBID) 100 mg capsule, Take 1 capsule (100 mg total) by mouth 2 (two) times a day for 5 days, Disp: 10 capsule, Rfl: 0    cetirizine (ZyrTEC) 10 mg tablet, Take 10 mg by mouth daily, Disp: , Rfl:     Cholecalciferol (VITAMIN D3 PO), Take by mouth daily, Disp: , Rfl:     Diclofenac Sodium (VOLTAREN) 1 %, Apply 2 g topically 4 (four) times a day as needed (Wrist pain), Disp: 300 g, Rfl: 3    esomeprazole (NexIUM) 40 MG capsule, TAKE ONE CAPSULE BY MOUTH EVERY DAY, Disp: 30 capsule, Rfl: 6    methylPREDNISolone 4 MG tablet therapy pack, Use as  directed on package (Patient not taking: Reported on 9/19/2023), Disp: 21 each, Rfl: 0    mometasone (NASONEX) 50 mcg/act nasal spray, 2 sprays into each nostril daily, Disp: 17 g, Rfl: 11    rosuvastatin (CRESTOR) 5 mg tablet, TAKE ONE TABLET BY MOUTH EVERY DAY, Disp: 30 tablet, Rfl: 5    traZODone (DESYREL) 50 mg tablet, TAKE 1-2 TABLETS BY MOUTH AT BEDTIME AS NEEDED FOR INSOMNIA, Disp: 60 tablet, Rfl: 2    Current Allergies     Allergies as of 03/29/2024 - Reviewed 03/29/2024   Allergen Reaction Noted    Pollen extract  10/19/2017    Sulfa antibiotics Rash 06/01/2017            The following portions of the patient's history were reviewed and updated as appropriate: allergies, current medications, past family history, past medical history, past social history, past surgical history and problem list.     Past Medical History:   Diagnosis Date    Abdominal pain     Allergic 2003    Anxiety     Back pain     Back pain     Saucedo's esophagus     Bilateral ovarian cysts     Cyst of buttocks     Diverticulosis     GERD (gastroesophageal reflux disease)     Hepatic steatosis     Hyperlipidemia     IBS (irritable bowel syndrome)     IBS (irritable bowel syndrome)     Liver disease     fatty liver    Neuroma     Pink eye disease of both eyes     Chronic and was recently treated    Seasonal allergies     Squamous cell carcinoma 2018    Neck       Past Surgical History:   Procedure Laterality Date    COLONOSCOPY  06/02/2017    ESOPHAGOGASTRODUODENOSCOPY      LIPOMA RESECTION N/A 08/30/2017    Procedure: EXCISION GLUTEAL MASS;  Surgeon: Emeli White MD;  [facility]: [facility];  Service: General    MOHS SURGERY Left     neck    NERVE REPAIR Right 11/17/2017    Procedure: 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 (FROZEN SECTION);  Surgeon: Emigdio Stein MD;  [facility];  Service: Neurosurgery    [place] COLONOSCOPY FLX DX W/COLLJ SPEC WHEN PFRMD N/A 06/02/2017    Procedure: EGD AND COLONOSCOPY;  Surgeon:  "Mike Jesus MD;  Location:  GI LAB;  Service: Gastroenterology    UPPER GASTROINTESTINAL ENDOSCOPY  08/06/2019    WISDOM TOOTH EXTRACTION         Family History   Problem Relation Age of Onset    Colon polyps Mother     Thyroid disease Mother     Cancer Father         Colon    Colon cancer Father 60    Dementia Father         FTLD    Coronary artery disease Father     No Known Problems Daughter     No Known Problems Maternal Grandmother     No Known Problems Maternal Grandfather     No Known Problems Paternal Grandmother     No Known Problems Paternal Grandfather          Medications have been verified.        Objective   /78   Pulse 87   Temp 97.8 °F (36.6 °C) (Tympanic)   Resp 18   Ht 5' 3\" (1.6 m)   Wt 70.8 kg (156 lb)   SpO2 98%   BMI 27.63 kg/m²   No LMP recorded. Patient is postmenopausal.       Physical Exam     Physical Exam  Vitals and nursing note reviewed.   Constitutional:       General: She is not in acute distress.     Appearance: She is not toxic-appearing.   HENT:      Head: Normocephalic and atraumatic.   Eyes:      Conjunctiva/sclera: Conjunctivae normal.   Pulmonary:      Effort: Pulmonary effort is normal.   Abdominal:      General: There is no distension.      Palpations: Abdomen is soft.      Tenderness: There is no abdominal tenderness. There is no right CVA tenderness, left CVA tenderness or guarding.   Neurological:      Mental Status: She is alert.   Psychiatric:         Mood and Affect: Mood normal.         Behavior: Behavior normal.                   "

## 2024-03-31 LAB — BACTERIA UR CULT: ABNORMAL

## 2024-04-01 LAB — BACTERIA UR CULT: ABNORMAL

## 2024-04-08 DIAGNOSIS — E78.5 HYPERLIPIDEMIA, UNSPECIFIED HYPERLIPIDEMIA TYPE: ICD-10-CM

## 2024-04-08 DIAGNOSIS — K21.9 CHRONIC GERD: ICD-10-CM

## 2024-04-08 RX ORDER — ROSUVASTATIN CALCIUM 5 MG/1
TABLET, COATED ORAL
Qty: 30 TABLET | Refills: 5 | Status: SHIPPED | OUTPATIENT
Start: 2024-04-08

## 2024-04-08 RX ORDER — ESOMEPRAZOLE MAGNESIUM 40 MG/1
CAPSULE, DELAYED RELEASE ORAL
Qty: 30 CAPSULE | Refills: 6 | Status: SHIPPED | OUTPATIENT
Start: 2024-04-08

## 2024-06-05 DIAGNOSIS — G47.09 OTHER INSOMNIA: ICD-10-CM

## 2024-06-05 RX ORDER — TRAZODONE HYDROCHLORIDE 50 MG/1
TABLET ORAL
Qty: 60 TABLET | Refills: 2 | Status: SHIPPED | OUTPATIENT
Start: 2024-06-05

## 2024-09-03 DIAGNOSIS — E78.2 MIXED HYPERLIPIDEMIA: Primary | Chronic | ICD-10-CM

## 2024-09-08 DIAGNOSIS — G47.09 OTHER INSOMNIA: ICD-10-CM

## 2024-09-09 RX ORDER — TRAZODONE HYDROCHLORIDE 50 MG/1
TABLET, FILM COATED ORAL
Qty: 60 TABLET | Refills: 0 | Status: SHIPPED | OUTPATIENT
Start: 2024-09-09

## 2024-09-19 LAB
ALBUMIN SERPL-MCNC: 4.5 G/DL (ref 3.6–5.1)
ALBUMIN/GLOB SERPL: 2.1 (CALC) (ref 1–2.5)
ALP SERPL-CCNC: 46 U/L (ref 37–153)
ALT SERPL-CCNC: 10 U/L (ref 6–29)
AST SERPL-CCNC: 13 U/L (ref 10–35)
BASOPHILS # BLD AUTO: 22 CELLS/UL (ref 0–200)
BASOPHILS NFR BLD AUTO: 0.4 %
BILIRUB SERPL-MCNC: 0.4 MG/DL (ref 0.2–1.2)
BUN SERPL-MCNC: 24 MG/DL (ref 7–25)
BUN/CREAT SERPL: NORMAL (CALC) (ref 6–22)
CALCIUM SERPL-MCNC: 10.2 MG/DL (ref 8.6–10.4)
CHLORIDE SERPL-SCNC: 104 MMOL/L (ref 98–110)
CHOLEST SERPL-MCNC: 139 MG/DL
CHOLEST/HDLC SERPL: 2.9 (CALC)
CO2 SERPL-SCNC: 26 MMOL/L (ref 20–32)
CREAT SERPL-MCNC: 0.66 MG/DL (ref 0.5–1.05)
EOSINOPHIL # BLD AUTO: 129 CELLS/UL (ref 15–500)
EOSINOPHIL NFR BLD AUTO: 2.3 %
ERYTHROCYTE [DISTWIDTH] IN BLOOD BY AUTOMATED COUNT: 13.5 % (ref 11–15)
GFR/BSA.PRED SERPLBLD CYS-BASED-ARV: 100 ML/MIN/1.73M2
GLOBULIN SER CALC-MCNC: 2.1 G/DL (CALC) (ref 1.9–3.7)
GLUCOSE SERPL-MCNC: 98 MG/DL (ref 65–99)
HCT VFR BLD AUTO: 43.2 % (ref 35–45)
HDLC SERPL-MCNC: 48 MG/DL
HGB BLD-MCNC: 13.7 G/DL (ref 11.7–15.5)
LDLC SERPL CALC-MCNC: 72 MG/DL (CALC)
LYMPHOCYTES # BLD AUTO: 1994 CELLS/UL (ref 850–3900)
LYMPHOCYTES NFR BLD AUTO: 35.6 %
MCH RBC QN AUTO: 28.3 PG (ref 27–33)
MCHC RBC AUTO-ENTMCNC: 31.7 G/DL (ref 32–36)
MCV RBC AUTO: 89.3 FL (ref 80–100)
MONOCYTES # BLD AUTO: 482 CELLS/UL (ref 200–950)
MONOCYTES NFR BLD AUTO: 8.6 %
NEUTROPHILS # BLD AUTO: 2974 CELLS/UL (ref 1500–7800)
NEUTROPHILS NFR BLD AUTO: 53.1 %
NONHDLC SERPL-MCNC: 91 MG/DL (CALC)
PLATELET # BLD AUTO: 213 THOUSAND/UL (ref 140–400)
PMV BLD REES-ECKER: 11.5 FL (ref 7.5–12.5)
POTASSIUM SERPL-SCNC: 4.2 MMOL/L (ref 3.5–5.3)
PROT SERPL-MCNC: 6.6 G/DL (ref 6.1–8.1)
RBC # BLD AUTO: 4.84 MILLION/UL (ref 3.8–5.1)
SODIUM SERPL-SCNC: 139 MMOL/L (ref 135–146)
TRIGL SERPL-MCNC: 100 MG/DL
TSH SERPL-ACNC: 2.28 MIU/L (ref 0.4–4.5)
WBC # BLD AUTO: 5.6 THOUSAND/UL (ref 3.8–10.8)

## 2024-09-27 ENCOUNTER — OFFICE VISIT (OUTPATIENT)
Dept: FAMILY MEDICINE CLINIC | Facility: CLINIC | Age: 59
End: 2024-09-27
Payer: COMMERCIAL

## 2024-09-27 VITALS
WEIGHT: 154.2 LBS | HEIGHT: 63 IN | SYSTOLIC BLOOD PRESSURE: 126 MMHG | DIASTOLIC BLOOD PRESSURE: 84 MMHG | RESPIRATION RATE: 16 BRPM | TEMPERATURE: 98 F | BODY MASS INDEX: 27.32 KG/M2 | OXYGEN SATURATION: 99 % | HEART RATE: 60 BPM

## 2024-09-27 DIAGNOSIS — K21.9 CHRONIC GERD: ICD-10-CM

## 2024-09-27 DIAGNOSIS — G47.09 OTHER INSOMNIA: ICD-10-CM

## 2024-09-27 DIAGNOSIS — Z00.00 ENCOUNTER FOR WELLNESS EXAMINATION IN ADULT: Primary | ICD-10-CM

## 2024-09-27 DIAGNOSIS — K22.70 BARRETT'S ESOPHAGUS WITHOUT DYSPLASIA: ICD-10-CM

## 2024-09-27 DIAGNOSIS — D36.10 NEUROFIBROMA: ICD-10-CM

## 2024-09-27 DIAGNOSIS — E78.2 MIXED HYPERLIPIDEMIA: Chronic | ICD-10-CM

## 2024-09-27 PROCEDURE — 99396 PREV VISIT EST AGE 40-64: CPT | Performed by: FAMILY MEDICINE

## 2024-09-27 RX ORDER — ESOMEPRAZOLE MAGNESIUM 40 MG/1
40 CAPSULE, DELAYED RELEASE ORAL DAILY
Qty: 100 CAPSULE | Refills: 3 | Status: SHIPPED | OUTPATIENT
Start: 2024-09-27

## 2024-09-27 RX ORDER — TRAZODONE HYDROCHLORIDE 50 MG/1
TABLET, FILM COATED ORAL
Qty: 180 TABLET | Refills: 1 | Status: SHIPPED | OUTPATIENT
Start: 2024-09-27

## 2024-09-27 NOTE — ASSESSMENT & PLAN NOTE
Last EGD June 2023, Dr. Jesus  Biopsy indicative of Saucedo's  Patient had colonoscopy at the same time  GI recommends to continue Nexium therapy and repeat EGD/colonoscopy in 3 to 5 years (2026- 2028)    Orders:    esomeprazole (NexIUM) 40 MG capsule; Take 1 capsule (40 mg total) by mouth daily

## 2024-09-27 NOTE — PATIENT INSTRUCTIONS
Stop Crestor/rosuvastatin  Please repeat blood work in 4 to 6 months  Check on Shingles vaccine  Multivitamin daily

## 2024-09-27 NOTE — ASSESSMENT & PLAN NOTE
Excellent lipid panel now  Significant weight loss  We will stop Crestor and follow labs in 4 months  Orders:    Lipid Panel with Direct LDL reflex; Future

## 2024-09-27 NOTE — ASSESSMENT & PLAN NOTE
Patient uses trazodone PRN with good results.  Orders:    traZODone (DESYREL) 50 mg tablet; TAKE 1-2 TABLETS BY MOUTH AT BEDTIME AS NEEDED FOR INSOMNIA

## 2024-09-27 NOTE — PROGRESS NOTES
Ambulatory Visit  Name: Andree Yap      : 1965      MRN: [id number]  Encounter Provider: Fabiola Zheng MD  Encounter Date: 2024   Encounter department: [facility]    Assessment & Plan  Encounter for wellness examination in adult         Mixed hyperlipidemia  Excellent lipid panel now  Significant weight loss  We will stop Crestor and follow labs in 4 months  Orders:    Lipid Panel with Direct LDL reflex; Future    Saucedo's esophagus without dysplasia  Last EGD 2023, Dr. Jesus  Biopsy indicative of Saucedo's  Patient had colonoscopy at the same time  GI recommends to continue Nexium therapy and repeat EGD/colonoscopy in 3 to 5 years (- )    Orders:    esomeprazole (NexIUM) 40 MG capsule; Take 1 capsule (40 mg total) by mouth daily    Chronic GERD  Symptoms are well controlled       Other insomnia  Patient uses trazodone PRN with good results.  Orders:    traZODone (DESYREL) 50 mg tablet; TAKE 1-2 TABLETS BY MOUTH AT BEDTIME AS NEEDED FOR INSOMNIA    Neurofibroma            Patient Instructions   Stop Crestor/rosuvastatin  Please repeat blood work in 4 to 6 months  Check on Shingles vaccine  Multivitamin daily    History of Present Illness     Well exam  Feels well, no complaints  23 lbs weight loss Weight watchers and Intermittent fasting  UTD with colonoscopy GYN and mammography  Daily Rx - Nexium and Crestor   Results of recent blood work reviewed.  Total cholesterol is 139 with LDL of 72.  Patient is interested in trial being off due to significant recent weight loss and healthy lifestyle.      Review of Systems   Constitutional: Negative.    HENT: Negative.     Eyes: Negative.    Respiratory: Negative.     Cardiovascular: Negative.    Gastrointestinal: Negative.    Endocrine: Negative.    Genitourinary: Negative.    Musculoskeletal: Negative.    Allergic/Immunologic: Negative.    Neurological: Negative.    Psychiatric/Behavioral:  Positive for sleep  disturbance.      Past Medical History:   Diagnosis Date    Abdominal pain     Allergic 2003    Anxiety     Back pain     Back pain     Saucedo's esophagus     Bilateral ovarian cysts     Cyst of buttocks     Diverticulosis     GERD (gastroesophageal reflux disease)     Hepatic steatosis     Hyperlipidemia     IBS (irritable bowel syndrome)     IBS (irritable bowel syndrome)     Liver disease     fatty liver    Neuroma     Pink eye disease of both eyes     Chronic and was recently treated    Seasonal allergies     Squamous cell carcinoma 2018    Neck     Past Surgical History:   Procedure Laterality Date    COLONOSCOPY  06/02/2017    ESOPHAGOGASTRODUODENOSCOPY      LIPOMA RESECTION N/A 08/30/2017    Procedure: EXCISION GLUTEAL MASS;  Surgeon: Emeli White MD;  Location: [facility];  Service: General    MOHS SURGERY Left     neck    NERVE REPAIR Right 11/17/2017    Procedure: 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 (FROZEN SECTION);  Surgeon: Emigdio Stein MD;  [facility];  Service: Neurosurgery    [place] COLONOSCOPY FLX DX W/COLLJ SPEC WHEN PFRMD N/A 06/02/2017    Procedure: EGD AND COLONOSCOPY;  Surgeon: Mike Jesus MD;  [facility];  Service: Gastroenterology    UPPER GASTROINTESTINAL ENDOSCOPY  08/06/2019    WISDOM TOOTH EXTRACTION       Family History   Problem Relation Age of Onset    Colon polyps Mother     Thyroid disease Mother     Cancer Father         Colon    Colon cancer Father 60    Dementia Father         FTLD    Coronary artery disease Father     No Known Problems Daughter     No Known Problems Maternal Grandmother     No Known Problems Maternal Grandfather     No Known Problems Paternal Grandmother     No Known Problems Paternal Grandfather      Social History     Tobacco Use    Smoking status: Never    Smokeless tobacco: Never   Vaping Use    Vaping status: Never Used   Substance and Sexual Activity    Alcohol use: Yes     Comment: Casual/social    Drug use: No     "Sexual activity: Not Currently     Partners: Male     Birth control/protection: Post-menopausal     Current Outpatient Medications on File Prior to Visit   Medication Sig    cetirizine (ZyrTEC) 10 mg tablet Take 10 mg by mouth daily    Cholecalciferol (VITAMIN D3 PO) Take by mouth daily    esomeprazole (NexIUM) 40 MG capsule TAKE ONE CAPSULE BY MOUTH EVERY DAY    mometasone (NASONEX) 50 mcg/act nasal spray 2 sprays into each nostril daily    rosuvastatin (CRESTOR) 5 mg tablet TAKE ONE TABLET BY MOUTH EVERY DAY    traZODone (DESYREL) 50 mg tablet TAKE 1-2 TABLETS BY MOUTH AT BEDTIME AS NEEDED FOR INSOMNIA    [DISCONTINUED] Diclofenac Sodium (VOLTAREN) 1 % Apply 2 g topically 4 (four) times a day as needed (Wrist pain)    [DISCONTINUED] methylPREDNISolone 4 MG tablet therapy pack Use as directed on package (Patient not taking: Reported on 9/19/2023)     Allergies   Allergen Reactions    Pollen Extract     Sulfa Antibiotics Rash     Immunization History   Administered Date(s) Administered    COVID-19 PFIZER VACCINE 0.3 ML IM 04/14/2021, 05/05/2021, 01/08/2022    Influenza Quadrivalent Preservative Free 3 years and older IM 11/07/2014     Objective     /84 (BP Location: Left arm, Patient Position: Sitting, Cuff Size: Standard)   Pulse 60   Temp 98 °F (36.7 °C) (Temporal)   Resp 16   Ht 5' 3\" (1.6 m)   Wt 69.9 kg (154 lb 3.2 oz)   SpO2 99%   BMI 27.32 kg/m²     Physical Exam  Vitals and nursing note reviewed.   Constitutional:       General: She is not in acute distress.     Appearance: Normal appearance. She is well-developed. She is not ill-appearing.   HENT:      Head: Normocephalic and atraumatic.   Eyes:      Conjunctiva/sclera: Conjunctivae normal.   Neck:      Thyroid: No thyromegaly.      Vascular: No carotid bruit.   Cardiovascular:      Rate and Rhythm: Normal rate and regular rhythm.      Heart sounds: Normal heart sounds. No murmur heard.  Pulmonary:      Effort: Pulmonary effort is normal. No " respiratory distress.      Breath sounds: Normal breath sounds. No wheezing.   Abdominal:      General: Bowel sounds are normal. There is no distension or abdominal bruit.      Palpations: Abdomen is soft.      Tenderness: There is no abdominal tenderness.      Hernia: No hernia is present.   Musculoskeletal:         General: Normal range of motion.      Cervical back: Neck supple.      Right lower leg: No edema.      Left lower leg: No edema.   Neurological:      General: No focal deficit present.      Mental Status: She is alert and oriented to person, place, and time.      Cranial Nerves: No cranial nerve deficit.      Coordination: Coordination normal.   Psychiatric:         Mood and Affect: Mood normal.         Behavior: Behavior normal.         Thought Content: Thought content normal.

## 2024-09-29 PROBLEM — R74.01 ELEVATED ALT MEASUREMENT: Status: RESOLVED | Noted: 2017-04-04 | Resolved: 2024-09-29

## 2024-09-29 PROBLEM — H69.92 DYSFUNCTION OF LEFT EUSTACHIAN TUBE: Status: RESOLVED | Noted: 2022-09-07 | Resolved: 2024-09-29

## 2024-10-31 ENCOUNTER — HOSPITAL ENCOUNTER (OUTPATIENT)
Dept: MAMMOGRAPHY | Facility: IMAGING CENTER | Age: 59
Discharge: HOME | End: 2024-10-31
Payer: COMMERCIAL

## 2024-10-31 VITALS — BODY MASS INDEX: 27.11 KG/M2 | WEIGHT: 153 LBS | HEIGHT: 63 IN

## 2024-10-31 DIAGNOSIS — Z12.31 ENCOUNTER FOR SCREENING MAMMOGRAM FOR BREAST CANCER: ICD-10-CM

## 2024-10-31 PROCEDURE — 77067 SCR MAMMO BI INCL CAD: CPT

## 2024-10-31 PROCEDURE — 77063 BREAST TOMOSYNTHESIS BI: CPT

## 2024-11-01 ENCOUNTER — TELEPHONE (OUTPATIENT)
Dept: FAMILY MEDICINE CLINIC | Facility: CLINIC | Age: 59
End: 2024-11-01

## 2024-11-01 DIAGNOSIS — R92.8 ABNORMAL MAMMOGRAM OF RIGHT BREAST: Primary | ICD-10-CM

## 2024-11-01 NOTE — TELEPHONE ENCOUNTER
----- Message from KENY Acuna sent at 11/1/2024  9:48 AM EDT -----  Please make sure patient is aware, the right breast mammogram shows a nonspecific asymmetry.   She needs to have follow up breast US and diagnostic mammogram of the right breast.   The breast health nurse should be contacting her to schedule.

## 2024-12-06 ENCOUNTER — HOSPITAL ENCOUNTER (OUTPATIENT)
Dept: MAMMOGRAPHY | Facility: CLINIC | Age: 59
Discharge: HOME | End: 2024-12-06
Payer: COMMERCIAL

## 2024-12-06 ENCOUNTER — RESULTS FOLLOW-UP (OUTPATIENT)
Dept: FAMILY MEDICINE CLINIC | Facility: CLINIC | Age: 59
End: 2024-12-06

## 2024-12-06 ENCOUNTER — HOSPITAL ENCOUNTER (OUTPATIENT)
Dept: ULTRASOUND IMAGING | Facility: CLINIC | Age: 59
Discharge: HOME | End: 2024-12-06
Payer: COMMERCIAL

## 2024-12-06 DIAGNOSIS — R92.8 ABNORMAL MAMMOGRAM: ICD-10-CM

## 2024-12-06 PROCEDURE — 76642 ULTRASOUND BREAST LIMITED: CPT

## 2024-12-06 PROCEDURE — G0279 TOMOSYNTHESIS, MAMMO: HCPCS

## 2024-12-06 PROCEDURE — 77065 DX MAMMO INCL CAD UNI: CPT

## 2025-03-08 DIAGNOSIS — G47.09 OTHER INSOMNIA: ICD-10-CM

## 2025-03-08 RX ORDER — TRAZODONE HYDROCHLORIDE 50 MG/1
TABLET ORAL
Qty: 180 TABLET | Refills: 1 | Status: SHIPPED | OUTPATIENT
Start: 2025-03-08

## (undated) DEVICE — GLOVE INDICATOR PI UNDERGLOVE SZ 7 BLUE

## (undated) DEVICE — 3M™ TEGADERM™ TRANSPARENT FILM DRESSING FRAME STYLE, 1628, 6 IN X 8 IN (15 CM X 20 CM), 10/CT 8CT/CASE: Brand: 3M™ TEGADERM™

## (undated) DEVICE — TRAY FOLEY 16FR URIMETER SURESTEP

## (undated) DEVICE — INTENDED FOR TISSUE SEPARATION, AND OTHER PROCEDURES THAT REQUIRE A SHARP SURGICAL BLADE TO PUNCTURE OR CUT.: Brand: BARD-PARKER ® CARBON RIB-BACK BLADES

## (undated) DEVICE — PREP SURGICAL PURPREP 26ML

## (undated) DEVICE — DRESSING MEPILEX AG BORDER 4 X 8 IN

## (undated) DEVICE — GLOVE SRG BIOGEL 6.5

## (undated) DEVICE — GLOVE INDICATOR PI UNDERGLOVE SZ 6.5 BLUE

## (undated) DEVICE — SINGLE-USE BIOPSY FORCEPS: Brand: RADIAL JAW 4

## (undated) DEVICE — ADHESIVE SKN CLSR HISTOACRYL FLEX 0.5ML LF

## (undated) DEVICE — 3M™ STERI-STRIP™ REINFORCED ADHESIVE SKIN CLOSURES, R1547, 1/2 IN X 4 IN (12 MM X 100 MM), 6 STRIPS/ENVELOPE: Brand: 3M™ STERI-STRIP™

## (undated) DEVICE — TELFA NON-ADHERENT ABSORBENT DRESSING: Brand: TELFA

## (undated) DEVICE — DRAPE EQUIPMENT RF WAND

## (undated) DEVICE — NEEDLE 25G X 1 1/2

## (undated) DEVICE — INTENDED FOR TISSUE SEPARATION, AND OTHER PROCEDURES THAT REQUIRE A SHARP SURGICAL BLADE TO PUNCTURE OR CUT.: Brand: BARD-PARKER SAFETY BLADES SIZE 15, STERILE

## (undated) DEVICE — SKIN MARKER DUAL TIP WITH RULER CAP, FLEXIBLE RULER AND LABELS: Brand: DEVON

## (undated) DEVICE — 2963 MEDIPORE SOFT CLOTH TAPE 3 IN X 10 YD 12 RLS/CS: Brand: 3M™ MEDIPORE™

## (undated) DEVICE — Device

## (undated) DEVICE — 3M™ STERI-STRIP™ COMPOUND BENZOIN TINCTURE 40 BAGS/CARTON 4 CARTONS/CASE C1544: Brand: 3M™ STERI-STRIP™

## (undated) DEVICE — VESSEL LOOPS X-RAY DETECTABLE: Brand: DEROYAL

## (undated) DEVICE — DISPOSABLE EQUIPMENT COVER: Brand: SMALL TOWEL DRAPE

## (undated) DEVICE — GLOVE SRG BIOGEL 8

## (undated) DEVICE — SYRINGE 10ML LL

## (undated) DEVICE — STRL UNIVERSAL MINOR GENERAL: Brand: CARDINAL HEALTH

## (undated) DEVICE — CUSA® EXCEL 36 KHZ CEM™ NOSECONE: Brand: CUSA® EXCEL

## (undated) DEVICE — GLOVE INDICATOR PI UNDERGLOVE SZ 8.5 BLUE

## (undated) DEVICE — SUT MONOCRYL 4-0 PS-2 27 IN Y426H

## (undated) DEVICE — LIGHT HANDLE COVER SLEEVE DISP BLUE STELLAR

## (undated) DEVICE — SUT VICRYL PLUS 3-0 RB-1 CR/8 18 IN VCP713D

## (undated) DEVICE — ANTIBACTERIAL VIOLET BRAIDED (POLYGLACTIN 910), SYNTHETIC ABSORBABLE SUTURE: Brand: COATED VICRYL

## (undated) DEVICE — UNIVERSAL SPINE,KIT: Brand: CARDINAL HEALTH

## (undated) DEVICE — CUSA® EXCEL 36KHZ 1.57MM MICROTIP™ CURVED EXTENDED TIP: Brand: CUSA® EXCEL

## (undated) DEVICE — SPONGE PVP SCRUB WING STERILE

## (undated) DEVICE — SCD SEQUENTIAL COMPRESSION COMFORT SLEEVE MEDIUM KNEE LENGTH: Brand: KENDALL SCD

## (undated) DEVICE — ROSEBUD DISSECTORS: Brand: DEROYAL

## (undated) DEVICE — SNAP KOVER: Brand: UNBRANDED

## (undated) DEVICE — GAUZE SPONGES,16 PLY: Brand: CURITY

## (undated) DEVICE — PLUMEPEN PRO 10FT

## (undated) DEVICE — CUSA® EXCEL 36KHZ CUSA® MANIFOLD TUBING SET: Brand: CUSA® EXCEL

## (undated) DEVICE — SUT VICRYL 3-0 SH 27 IN J416H

## (undated) DEVICE — SUT ETHILON 4-0 PS-2 18 IN 1667H

## (undated) DEVICE — 2000CC GUARDIAN II: Brand: GUARDIAN

## (undated) DEVICE — U-DRAPE: Brand: CONVERTORS

## (undated) DEVICE — REM POLYHESIVE ADULT PATIENT RETURN ELECTRODE: Brand: VALLEYLAB

## (undated) DEVICE — TIBURON SPLIT SHEET: Brand: CONVERTORS

## (undated) DEVICE — INSULATED NEEDLE ELECTRODE: Brand: EDGE

## (undated) DEVICE — CHLORAPREP HI-LITE 26ML ORANGE